# Patient Record
Sex: FEMALE | Race: WHITE | NOT HISPANIC OR LATINO | Employment: OTHER | URBAN - METROPOLITAN AREA
[De-identification: names, ages, dates, MRNs, and addresses within clinical notes are randomized per-mention and may not be internally consistent; named-entity substitution may affect disease eponyms.]

---

## 2022-06-24 ENCOUNTER — OFFICE VISIT (OUTPATIENT)
Dept: GASTROENTEROLOGY | Facility: CLINIC | Age: 87
End: 2022-06-24
Payer: MEDICARE

## 2022-06-24 VITALS
SYSTOLIC BLOOD PRESSURE: 128 MMHG | BODY MASS INDEX: 24.43 KG/M2 | OXYGEN SATURATION: 97 % | HEART RATE: 114 BPM | HEIGHT: 66 IN | DIASTOLIC BLOOD PRESSURE: 82 MMHG | WEIGHT: 152 LBS

## 2022-06-24 DIAGNOSIS — E43 SEVERE PROTEIN-CALORIE MALNUTRITION (HCC): Primary | ICD-10-CM

## 2022-06-24 PROCEDURE — 99203 OFFICE O/P NEW LOW 30 MIN: CPT | Performed by: PHYSICIAN ASSISTANT

## 2022-06-24 RX ORDER — TRAMADOL HYDROCHLORIDE 50 MG/1
50 TABLET ORAL EVERY 6 HOURS PRN
COMMUNITY
End: 2022-07-11

## 2022-06-24 RX ORDER — AMOXICILLIN 250 MG
1 CAPSULE ORAL DAILY
COMMUNITY
Start: 2022-05-23 | End: 2022-07-11

## 2022-06-24 RX ORDER — BISACODYL 10 MG
10 SUPPOSITORY, RECTAL RECTAL DAILY
COMMUNITY
End: 2022-07-11

## 2022-06-24 RX ORDER — MIRTAZAPINE 7.5 MG/1
7.5 TABLET, FILM COATED ORAL
COMMUNITY
End: 2022-07-11

## 2022-06-24 RX ORDER — LACTULOSE 10 G/15ML
20 SOLUTION ORAL 3 TIMES DAILY
COMMUNITY
End: 2022-07-11

## 2022-06-24 RX ORDER — OTC SKIN PROTECTANT DRUG PRODUCTS 0.04 G/G
OINTMENT TOPICAL
COMMUNITY

## 2022-06-24 RX ORDER — APIXABAN 5 MG/1
5 TABLET, FILM COATED ORAL 2 TIMES DAILY
COMMUNITY
Start: 2022-04-27 | End: 2022-06-30 | Stop reason: SDUPTHER

## 2022-06-24 RX ORDER — MINERAL OIL 100 G/100G
1 OIL RECTAL ONCE
COMMUNITY
End: 2022-07-11

## 2022-06-24 RX ORDER — AMMONIUM LACTATE 12 G/100G
1 LOTION TOPICAL 2 TIMES DAILY
COMMUNITY
Start: 2022-05-23 | End: 2022-07-11

## 2022-06-24 RX ORDER — PANTOPRAZOLE SODIUM 40 MG/1
40 TABLET, DELAYED RELEASE ORAL
COMMUNITY
Start: 2022-05-23 | End: 2022-07-17 | Stop reason: ALTCHOICE

## 2022-06-27 NOTE — PROGRESS NOTES
[facility] - Outpatient Consultation  Liol Weiner 81 y o  female MRN: [id number]  Encounter: [id number]          ASSESSMENT AND PLAN:      1  Severe protein-calorie malnutrition ([facility] )  She has progressing dementia  She has a sacral wound which is not healing felt to be at least in part due to malnutrition  Her son is requesting the placement of a PEG tube    ______________________________________________________________________    HPI:  69-year-old female with advancing dementia presents today with her son at the bedside to discuss placement of a PEG tube  Over the past few months the patient has had a significant decline in her functional status  She is now residing at a nursing home  She is not eating well and is becoming malnourished  She has a sacral wound which is nonhealing likely in part secondary to malnutrition  REVIEW OF SYSTEMS:    CONSTITUTIONAL: Denies any fever, chills, rigors, and weight loss  HEENT: No earache or tinnitus  Denies hearing loss or visual disturbances  CARDIOVASCULAR: No chest pain or palpitations  RESPIRATORY: Denies any cough, hemoptysis, shortness of breath or dyspnea on exertion  GASTROINTESTINAL: As noted in the History of Present Illness  GENITOURINARY: No problems with urination  Denies any hematuria or dysuria  NEUROLOGIC: No dizziness or vertigo, denies headaches  MUSCULOSKELETAL: Denies any muscle or joint pain  SKIN: Denies skin rashes or itching  ENDOCRINE: Denies excessive thirst  Denies intolerance to heat or cold  PSYCHOSOCIAL: Denies depression or anxiety  Denies any recent memory loss  Historical Information   Past Medical History:   Diagnosis Date    A-fib ([facility] )     Dementia ([facility] )     DVT (deep venous thrombosis) (HCC)     Hypertension     Pressure ulcer of sacral region      History reviewed  No pertinent surgical history    Social History   Social History     Substance and Sexual Activity   Alcohol Use None     Social History     Substance and Sexual Activity   Drug Use Never     Social History     Tobacco Use   Smoking Status Never Smoker   Smokeless Tobacco Never Used     History reviewed  No pertinent family history  Meds/Allergies       Current Outpatient Medications:     ammonium lactate (LAC-HYDRIN) 12 % lotion    bisacodyl (Dulcolax) 10 mg suppository    Eliquis 5 MG    lactulose (CHRONULAC) 10 g/15 mL solution    mineral oil enema    mirtazapine (REMERON) 7 5 MG tablet    pantoprazole (PROTONIX) 40 mg tablet    senna-docusate sodium (SENOKOT S) 8 6-50 mg per tablet    Skin Protectants, Misc  (PeriGuard) OINT    traMADol (ULTRAM) 50 mg tablet    No Known Allergies        Objective     Blood pressure 128/82, pulse (!) 114, height 5' 6" (1 676 m), weight 68 9 kg (152 lb), SpO2 97 %  Body mass index is 24 53 kg/m²  PHYSICAL EXAM:      General Appearance:   Alert, cooperative, no distress   HEENT:   Normocephalic, atraumatic, anicteric      Neck:  Supple, symmetrical, trachea midline   Lungs:   Clear to auscultation bilaterally; no rales, rhonchi or wheezing; respirations unlabored    Heart[de-identified]   Regular rate and rhythm; no murmur, rub, or gallop  Abdomen:   Soft, non-tender, non-distended; normal bowel sounds; no masses, no organomegaly    Genitalia:   Deferred    Rectal:   Deferred    Extremities:  No cyanosis, clubbing or edema    Pulses:  2+ and symmetric    Skin:  No jaundice, rashes, or lesions    Lymph nodes:  No palpable cervical lymphadenopathy        Lab Results:   No results found for any previous visit  Radiology Results:   No results found

## 2022-06-29 ENCOUNTER — TELEPHONE (OUTPATIENT)
Dept: GASTROENTEROLOGY | Facility: CLINIC | Age: 87
End: 2022-06-29

## 2022-06-29 NOTE — TELEPHONE ENCOUNTER
Spoke with Negrita and patient was scheduled for 7/1/22 as she is not eating much at the present time, so it was determined to be an emergent need      Emailed appropriate contacts for nutritional eval

## 2022-06-29 NOTE — TELEPHONE ENCOUNTER
Gillian Dhillon from [place] [facility] called to schedule PEG tube placement  Cell [phone]  [facility] [phone]

## 2022-06-30 ENCOUNTER — TELEPHONE (OUTPATIENT)
Dept: GASTROENTEROLOGY | Facility: CLINIC | Age: 87
End: 2022-06-30

## 2022-06-30 DIAGNOSIS — E43 SEVERE PROTEIN-CALORIE MALNUTRITION (HCC): Primary | ICD-10-CM

## 2022-06-30 RX ORDER — APIXABAN 5 MG/1
TABLET, FILM COATED ORAL
Qty: 30 TABLET | Refills: 3 | Status: SHIPPED | OUTPATIENT
Start: 2022-06-30 | End: 2022-06-30 | Stop reason: SDUPTHER

## 2022-06-30 RX ORDER — APIXABAN 5 MG/1
TABLET, FILM COATED ORAL
Qty: 30 TABLET | Refills: 3 | Status: SHIPPED | OUTPATIENT
Start: 2022-06-30

## 2022-06-30 NOTE — TELEPHONE ENCOUNTER
Trang Barr called to say they need to reschedule this peg tube placement as the patient can not be safely transported to the hospital     Rescheduled for 7/20/22

## 2022-07-01 ENCOUNTER — HOSPITAL ENCOUNTER (INPATIENT)
Facility: HOSPITAL | Age: 87
LOS: 9 days | Discharge: NURSING, REHAB OR LONG-TERM CARE | DRG: 640 | End: 2022-07-11
Attending: EMERGENCY MEDICINE | Admitting: INTERNAL MEDICINE
Payer: MEDICARE

## 2022-07-01 DIAGNOSIS — N39.0 UTI (URINARY TRACT INFECTION): ICD-10-CM

## 2022-07-01 DIAGNOSIS — R65.10 SIRS (SYSTEMIC INFLAMMATORY RESPONSE SYNDROME) (HCC): ICD-10-CM

## 2022-07-01 DIAGNOSIS — R53.1 GENERALIZED WEAKNESS: ICD-10-CM

## 2022-07-01 DIAGNOSIS — E43 SEVERE PROTEIN-CALORIE MALNUTRITION (HCC): Primary | ICD-10-CM

## 2022-07-01 PROBLEM — I10 ESSENTIAL HYPERTENSION: Status: ACTIVE | Noted: 2021-07-29

## 2022-07-01 PROBLEM — F03.91 DEMENTIA WITH BEHAVIORAL DISTURBANCE (HCC): Status: ACTIVE | Noted: 2021-07-29

## 2022-07-01 PROBLEM — F03.918 DEMENTIA WITH BEHAVIORAL DISTURBANCE: Status: ACTIVE | Noted: 2021-07-29

## 2022-07-01 PROBLEM — E46 PROTEIN MALNUTRITION (HCC): Status: ACTIVE | Noted: 2021-08-04

## 2022-07-01 PROBLEM — I82.409 DVT (DEEP VENOUS THROMBOSIS) (HCC): Status: ACTIVE | Noted: 2022-05-14

## 2022-07-01 LAB
ALBUMIN SERPL BCP-MCNC: 2 G/DL (ref 3.5–5)
ALP SERPL-CCNC: 60 U/L (ref 46–116)
ALT SERPL W P-5'-P-CCNC: 12 U/L (ref 12–78)
ANION GAP SERPL CALCULATED.3IONS-SCNC: 10 MMOL/L (ref 4–13)
APTT PPP: 34 SECONDS (ref 23–37)
AST SERPL W P-5'-P-CCNC: 53 U/L (ref 5–45)
BASOPHILS # BLD AUTO: 0.03 THOUSANDS/ΜL (ref 0–0.1)
BASOPHILS NFR BLD AUTO: 0 % (ref 0–1)
BILIRUB SERPL-MCNC: 1.13 MG/DL (ref 0.2–1)
BUN SERPL-MCNC: 24 MG/DL (ref 5–25)
CALCIUM ALBUM COR SERPL-MCNC: 11.5 MG/DL (ref 8.3–10.1)
CALCIUM SERPL-MCNC: 9.9 MG/DL (ref 8.3–10.1)
CHLORIDE SERPL-SCNC: 106 MMOL/L (ref 100–108)
CO2 SERPL-SCNC: 28 MMOL/L (ref 21–32)
CREAT SERPL-MCNC: 0.62 MG/DL (ref 0.6–1.3)
EOSINOPHIL # BLD AUTO: 0.07 THOUSAND/ΜL (ref 0–0.61)
EOSINOPHIL NFR BLD AUTO: 1 % (ref 0–6)
ERYTHROCYTE [DISTWIDTH] IN BLOOD BY AUTOMATED COUNT: 16.8 % (ref 11.6–15.1)
GFR SERPL CREATININE-BSD FRML MDRD: 82 ML/MIN/1.73SQ M
GLUCOSE SERPL-MCNC: 93 MG/DL (ref 65–140)
HCT VFR BLD AUTO: 37.8 % (ref 34.8–46.1)
HGB BLD-MCNC: 12.3 G/DL (ref 11.5–15.4)
IMM GRANULOCYTES # BLD AUTO: 0.06 THOUSAND/UL (ref 0–0.2)
IMM GRANULOCYTES NFR BLD AUTO: 1 % (ref 0–2)
INR PPP: 1.41 (ref 0.84–1.19)
LYMPHOCYTES # BLD AUTO: 1.91 THOUSANDS/ΜL (ref 0.6–4.47)
LYMPHOCYTES NFR BLD AUTO: 22 % (ref 14–44)
MCH RBC QN AUTO: 31.9 PG (ref 26.8–34.3)
MCHC RBC AUTO-ENTMCNC: 32.5 G/DL (ref 31.4–37.4)
MCV RBC AUTO: 98 FL (ref 82–98)
MONOCYTES # BLD AUTO: 0.73 THOUSAND/ΜL (ref 0.17–1.22)
MONOCYTES NFR BLD AUTO: 8 % (ref 4–12)
NEUTROPHILS # BLD AUTO: 5.85 THOUSANDS/ΜL (ref 1.85–7.62)
NEUTS SEG NFR BLD AUTO: 68 % (ref 43–75)
NRBC BLD AUTO-RTO: 0 /100 WBCS
PLATELET # BLD AUTO: 328 THOUSANDS/UL (ref 149–390)
PMV BLD AUTO: 9.7 FL (ref 8.9–12.7)
POTASSIUM SERPL-SCNC: 4.6 MMOL/L (ref 3.5–5.3)
PROT SERPL-MCNC: 6.2 G/DL (ref 6.4–8.2)
PROTHROMBIN TIME: 16.6 SECONDS (ref 11.6–14.5)
RBC # BLD AUTO: 3.86 MILLION/UL (ref 3.81–5.12)
SODIUM SERPL-SCNC: 144 MMOL/L (ref 136–145)
WBC # BLD AUTO: 8.65 THOUSAND/UL (ref 4.31–10.16)

## 2022-07-01 PROCEDURE — 99285 EMERGENCY DEPT VISIT HI MDM: CPT

## 2022-07-01 PROCEDURE — 36415 COLL VENOUS BLD VENIPUNCTURE: CPT | Performed by: EMERGENCY MEDICINE

## 2022-07-01 PROCEDURE — 99284 EMERGENCY DEPT VISIT MOD MDM: CPT | Performed by: EMERGENCY MEDICINE

## 2022-07-01 PROCEDURE — 99220 PR INITIAL OBSERVATION CARE/DAY 70 MINUTES: CPT | Performed by: INTERNAL MEDICINE

## 2022-07-01 PROCEDURE — 85730 THROMBOPLASTIN TIME PARTIAL: CPT | Performed by: EMERGENCY MEDICINE

## 2022-07-01 PROCEDURE — 85025 COMPLETE CBC W/AUTO DIFF WBC: CPT | Performed by: EMERGENCY MEDICINE

## 2022-07-01 PROCEDURE — 1124F ACP DISCUSS-NO DSCNMKR DOCD: CPT | Performed by: EMERGENCY MEDICINE

## 2022-07-01 PROCEDURE — 85610 PROTHROMBIN TIME: CPT | Performed by: EMERGENCY MEDICINE

## 2022-07-01 PROCEDURE — 80053 COMPREHEN METABOLIC PANEL: CPT | Performed by: EMERGENCY MEDICINE

## 2022-07-01 RX ORDER — SODIUM CHLORIDE 9 MG/ML
75 INJECTION, SOLUTION INTRAVENOUS CONTINUOUS
Status: DISCONTINUED | OUTPATIENT
Start: 2022-07-01 | End: 2022-07-04

## 2022-07-01 RX ORDER — ACETAMINOPHEN 325 MG/1
650 TABLET ORAL EVERY 6 HOURS PRN
Status: DISCONTINUED | OUTPATIENT
Start: 2022-07-01 | End: 2022-07-04

## 2022-07-01 RX ADMIN — SODIUM CHLORIDE 75 ML/HR: 0.9 INJECTION, SOLUTION INTRAVENOUS at 19:09

## 2022-07-01 NOTE — ED NOTES
INC care provided and pt repositioned for comfort  Pts son at bedside       David Johnson RN  07/01/22 0256

## 2022-07-01 NOTE — ED PROVIDER NOTES
Pt Name: Mely Rascon  MRN: [id number]  [place] 1935  Age/Sex: 81 y o  female  Date of evaluation: 7/1/2022  PCP: Cortney Gardner    Chief Complaint   Patient presents with    Weight Loss     Pt arrives via EMS from [facility] with an approx 17# wt  Loss over the past 2 weeks  As per EMS pts son is requesting PEG tube placement by Dr Mercy Ma         HPI and MDM    81 y o  female presenting for PEG tube placement  Son present in room with patient, patient has dementia, unable to provide any history  Patient has severe protein calorie malnutrition  Was supposed to get a PEG tube placed by GI today, however she missed the appointment because the nursing home was unable to find transportation for her  Son called 911 and patient was brought to the emergency department  He states that he did call the GI office, and GI attending Dr Lexie Ramos is aware of the patient  Patient appears as at baseline  No abdominal tenderness to palpation  I discussed with GI, unable to perform PEG tube placement today, however they can do it tomorrow, recommending hospitalization  Ordered baseline blood work, discussed with internal medicine for hospitalization  Son was updated with plan and he is in agreement  Medications - No data to display      Past Medical and Surgical History    Past Medical History:   Diagnosis Date    [facility]     Dementia ([facility] )     DVT [facility]deep venous thrombosis) ([facility] )     Hypertension     Pressure ulcer of sacral region        No past surgical history on file  No family history on file  Social History     Tobacco Use    Smoking status: Never Smoker    Smokeless tobacco: Never Used   Vaping Use    Vaping Use: Never used   Substance Use Topics    Drug use: Never           Allergies    No Known Allergies    Home Medications    Prior to Admission medications    Medication Sig Start Date End Date Taking?  Authorizing Provider   ammonium lactate (LAC-HYDRIN) 12 % lotion Apply 1 application topically 2 (two) times a day 5/23/22 5/23/23  Historical Provider, MD   bisacodyl (Dulcolax) 10 mg suppository Insert 10 mg into the rectum daily    Historical Provider, MD   Eliquis 5 MG Hold medication today, 6/30/2022 and tomorrow, 7/1/2022 6/30/22   Antoni De Souza PA-C   lactulose (CHRONULAC) 10 g/15 mL solution Take 20 g by mouth 3 (three) times a day    Historical Provider, MD   mineral oil enema Insert 1 enema into the rectum once    Historical Provider, MD   mirtazapine (REMERON) 7 5 MG tablet Take 7 5 mg by mouth daily at bedtime    Historical Provider, MD   pantoprazole (PROTONIX) 40 mg tablet Take 40 mg by mouth 5/23/22 7/12/22  Historical Provider, MD   senna-docusate sodium (SENOKOT S) 8 6-50 mg per tablet Take 1 tablet by mouth daily 5/23/22 5/23/23  Historical Provider, MD   Skin Protectants, Misc  (PeriGuard) OINT Apply topically    Historical Provider, MD   traMADol (ULTRAM) 50 mg tablet Take 50 mg by mouth every 6 (six) hours as needed for moderate pain    Historical Provider, MD           Review of Systems    Review of Systems   Unable to perform ROS: Dementia           Physical Exam      ED Triage Vitals [07/01/22 1615]   Temp Pulse Respirations Blood Pressure SpO2   -- (!) 128 18 122/77 100 %      Temp src Heart Rate Source Patient Position - Orthostatic VS BP Location FiO2 (%)   -- -- -- -- --      Pain Score       --               Physical Exam  Constitutional:       General: She is not in acute distress  HENT:      Head: Normocephalic and atraumatic  Nose: Nose normal       Mouth/Throat:      Mouth: Mucous membranes are moist    Eyes:      Extraocular Movements: Extraocular movements intact  Pupils: Pupils are equal, round, and reactive to light  Cardiovascular:      Rate and Rhythm: Normal rate and regular rhythm  Pulmonary:      Effort: Pulmonary effort is normal  No respiratory distress  Abdominal:      General: There is no distension  Palpations: Abdomen is soft  Tenderness: There is no abdominal tenderness  There is no guarding or rebound  Musculoskeletal:         General: No tenderness or signs of injury  Cervical back: Normal range of motion and neck supple  Skin:     General: Skin is warm  Findings: No erythema  Neurological:      Mental Status: She is alert  Mental status is at baseline  Diagnostic Results      Labs:    Results Reviewed     Procedure Component Value Units Date/Time    CBC and differential [[id number]]     Lab Status: No result Specimen: Blood     Comprehensive metabolic panel [[id number]]     Lab Status: No result Specimen: Blood     Protime-INR [[id number]]     Lab Status: No result Specimen: Blood     APTT [[id number]]     Lab Status: No result Specimen: Blood           All labs reviewed and utilized in the medical decision making process    Radiology:    No orders to display       All radiology studies independently viewed by me and interpreted by the radiologist     Procedure    Procedures        FINAL IMPRESSION    Final diagnoses:   Severe protein-calorie malnutrition ([address] )         DISPOSITION    Time reflects when diagnosis was documented in both MDM as applicable and the Disposition within this note     Time User Action Codes Description Comment    7/1/2022  4:52 PM Chasidy Yu Add [E43] Severe protein-calorie malnutrition [facility])       ED Disposition     ED Disposition   Admit    Condition   Stable    Date/Time   Fri Jul 1, 2022  4:52 PM    Comment   Case was discussed with Dr Wandy Smith and the patient's admission status was agreed to be Admission Status: observation status to the service of Dr Wandy Smith   Follow-up Information    None           PATIENT REFERRED TO:    No follow-up provider specified  DISCHARGE MEDICATIONS:    Patient's Medications   Discharge Prescriptions    No medications on file       No discharge procedures on file           Lisandro Dillon DO        This note was partially completed using voice recognition technology, and was scanned for gross errors; however some errors may still exist  Please contact the author with any questions or requests for clarification        Warrick Merlin, DO  07/01/22 9655

## 2022-07-01 NOTE — ASSESSMENT & PLAN NOTE
Malnutrition Findings:        severe protein calorie malnutrition secondary to severe dementia   Plan for PEG tube tomorrow  Missed appointment for PEG tube unable to perform today  Discussed with GI plan for PEG tube placement tomorrow will make NPO at midnight                          BMI Findings: There is no height or weight on file to calculate BMI

## 2022-07-01 NOTE — H&P
[facility]  H&P- Cyndie Hill 1935, 81 y o  female MRN: [id number]  Unit/Bed#: ED 10 Encounter: [id number]  Primary Care Provider: Dmitry Flores   Date and time admitted to hospital: 7/1/2022  4:10 PM    * Protein malnutrition [facility])  Assessment & Plan  Malnutrition Findings:        severe protein calorie malnutrition secondary to severe dementia   Plan for PEG tube tomorrow  Missed appointment for PEG tube unable to perform today  Discussed with GI plan for PEG tube placement tomorrow will make NPO at midnight                          BMI Findings: There is no height or weight on file to calculate BMI  Dementia with behavioral disturbance ([facility] )  Assessment & Plan  Currently at baseline  Delirium precautions    Essential hypertension  Assessment & Plan  Continue home meds    DVT (deep venous thrombosis) ([facility])  Assessment & Plan  Hold Eliquis for now given PEG tube placement      VTE Prophylaxis: Pharmacologic VTE Prophylaxis contraindicated due to held due to planned procedure  / sequential compression device   Code Status: full code  POLST: There is no POLST form on file for this patient (pre-hospital)  Discussion with family: pt    Anticipated Length of Stay:  Patient will be admitted on an Observation basis with an anticipated length of stay of  < 2 midnights  Justification for Hospital Stay:  PEG tube placement    Total Time for Visit, including Counseling / Coordination of Care: 60 minutes  Greater than 50% of this total time spent on direct patient counseling and coordination of care  Chief Complaint:   Generalized weakness    History of Present Illness:    Cyndie Hill is a 81 y o  female with past medical history significant atrial fibrillation on Eliquis, dementia, hypertension who initially presented after missed appointment for peg tube placement    Was sent in for PEG tube placement  Currently without any acute complaints    Review of Systems:    Review of Systems   Constitutional: Negative for activity change, appetite change, chills, diaphoresis, fever and unexpected weight change  HENT: Negative for congestion, facial swelling and rhinorrhea  Eyes: Negative for photophobia and visual disturbance  Respiratory: Negative for cough, shortness of breath and wheezing  Cardiovascular: Negative for chest pain and palpitations  Gastrointestinal: Negative for abdominal pain, blood in stool, constipation, diarrhea, nausea and vomiting  Genitourinary: Negative for decreased urine volume, difficulty urinating, dysuria, flank pain, frequency, hematuria and urgency  Musculoskeletal: Negative for arthralgias, back pain, joint swelling and myalgias  Neurological: Negative for dizziness, syncope, facial asymmetry, light-headedness, numbness and headaches  Psychiatric/Behavioral: Negative for confusion and decreased concentration  The patient is not nervous/anxious  Past Medical and Surgical History:     Past Medical History:   Diagnosis Date    A-fib ([place] [address] )     Dementia ([place] [address] )     DVT (deep venous thrombosis) ([place] [address] )     Hypertension     Pressure ulcer of sacral region        No past surgical history on file  Meds/Allergies:    Prior to Admission medications    Medication Sig Start Date End Date Taking?  Authorizing Provider   ammonium lactate (LAC-HYDRIN) 12 % lotion Apply 1 application topically 2 (two) times a day 5/23/22 5/23/23  Historical Provider, MD   bisacodyl (Dulcolax) 10 mg suppository Insert 10 mg into the rectum daily    Historical Provider, MD   Eliquis 5 MG Hold medication today, 6/30/2022 and tomorrow, 7/1/2022 6/30/22   Seda Jo PA-C   lactulose (CHRONULAC) 10 g/15 mL solution Take 20 g by mouth 3 (three) times a day    Historical Provider, MD   mineral oil enema Insert 1 enema into the rectum once    Historical Provider, MD   mirtazapine (REMERON) 7 5 MG tablet Take 7 5 mg by mouth daily at bedtime Historical Provider, MD   pantoprazole (PROTONIX) 40 mg tablet Take 40 mg by mouth 5/23/22 7/12/22  Historical Provider, MD   senna-docusate sodium (SENOKOT S) 8 6-50 mg per tablet Take 1 tablet by mouth daily 5/23/22 5/23/23  Historical Provider, MD   Skin Protectants, Misc  (PeriGuard) OINT Apply topically    Historical Provider, MD   traMADol (ULTRAM) 50 mg tablet Take 50 mg by mouth every 6 (six) hours as needed for moderate pain    Historical Provider, MD     I have reviewed home medications with patient personally  Allergies: No Known Allergies    Social History:     Marital Status:      Patient Pre-hospital Living Situation: home  Patient Pre-hospital Level of Mobility: independent  Patient Pre-hospital Diet Restrictions: none  Substance Use History:   Social History     Substance and Sexual Activity   Alcohol Use None     Social History     Tobacco Use   Smoking Status Never Smoker   Smokeless Tobacco Never Used     Social History     Substance and Sexual Activity   Drug Use Never       Family History:    No family history on file  Physical Exam:     Vitals:   Blood Pressure: 122/77 (07/01/22 1615)  Pulse: (!) 128 (07/01/22 1615)  Respirations: 18 (07/01/22 1615)  SpO2: 100 % (07/01/22 1615)    Physical Exam  Constitutional:       General: She is not in acute distress  Appearance: She is well-developed  She is not diaphoretic  HENT:      Head: Normocephalic and atraumatic  Nose: Nose normal       Mouth/Throat:      Pharynx: No oropharyngeal exudate  Eyes:      General: No scleral icterus  Right eye: No discharge  Left eye: No discharge  Conjunctiva/sclera: Conjunctivae normal    Neck:      Thyroid: No thyromegaly  Vascular: No JVD  Cardiovascular:      Rate and Rhythm: Normal rate and regular rhythm  Heart sounds: Normal heart sounds  No murmur heard  No friction rub  No gallop     Pulmonary:      Effort: Pulmonary effort is normal  No respiratory distress  Breath sounds: Normal breath sounds  No wheezing or rales  Chest:      Chest wall: No tenderness  Abdominal:      General: Bowel sounds are normal  There is no distension  Palpations: Abdomen is soft  Tenderness: There is no abdominal tenderness  There is no guarding or rebound  Musculoskeletal:         General: No tenderness or deformity  Normal range of motion  Cervical back: Normal range of motion and neck supple  Skin:     General: Skin is warm and dry  Findings: No erythema or rash  Neurological:      Mental Status: She is alert  Mental status is at baseline  Cranial Nerves: No cranial nerve deficit  Sensory: No sensory deficit  Motor: No abnormal muscle tone  Coordination: Coordination normal              Additional Data:     Lab Results: I have personally reviewed pertinent reports  Imaging: I have personally reviewed pertinent reports  No orders to display       EKG, Pathology, and Other Studies Reviewed on Admission:   · EKG: reviewed    Allscripts / Epic Records Reviewed: Yes     ** Please Note: This note has been constructed using a voice recognition system   **

## 2022-07-02 ENCOUNTER — APPOINTMENT (OUTPATIENT)
Dept: GASTROENTEROLOGY | Facility: HOSPITAL | Age: 87
DRG: 640 | End: 2022-07-02
Payer: MEDICARE

## 2022-07-02 ENCOUNTER — ANESTHESIA EVENT (INPATIENT)
Dept: GASTROENTEROLOGY | Facility: HOSPITAL | Age: 87
DRG: 640 | End: 2022-07-02
Payer: MEDICARE

## 2022-07-02 ENCOUNTER — ANESTHESIA (INPATIENT)
Dept: GASTROENTEROLOGY | Facility: HOSPITAL | Age: 87
DRG: 640 | End: 2022-07-02
Payer: MEDICARE

## 2022-07-02 PROBLEM — R53.1 GENERALIZED WEAKNESS: Status: ACTIVE | Noted: 2022-07-02

## 2022-07-02 LAB
ANION GAP SERPL CALCULATED.3IONS-SCNC: 9 MMOL/L (ref 4–13)
BASOPHILS # BLD AUTO: 0.02 THOUSANDS/ΜL (ref 0–0.1)
BASOPHILS NFR BLD AUTO: 0 % (ref 0–1)
BUN SERPL-MCNC: 20 MG/DL (ref 5–25)
CALCIUM SERPL-MCNC: 9 MG/DL (ref 8.3–10.1)
CHLORIDE SERPL-SCNC: 110 MMOL/L (ref 100–108)
CO2 SERPL-SCNC: 27 MMOL/L (ref 21–32)
CREAT SERPL-MCNC: 0.54 MG/DL (ref 0.6–1.3)
EOSINOPHIL # BLD AUTO: 0.07 THOUSAND/ΜL (ref 0–0.61)
EOSINOPHIL NFR BLD AUTO: 1 % (ref 0–6)
ERYTHROCYTE [DISTWIDTH] IN BLOOD BY AUTOMATED COUNT: 16.6 % (ref 11.6–15.1)
GFR SERPL CREATININE-BSD FRML MDRD: 86 ML/MIN/1.73SQ M
GLUCOSE P FAST SERPL-MCNC: 100 MG/DL (ref 65–99)
GLUCOSE SERPL-MCNC: 100 MG/DL (ref 65–140)
HCT VFR BLD AUTO: 32.1 % (ref 34.8–46.1)
HGB BLD-MCNC: 10.6 G/DL (ref 11.5–15.4)
IMM GRANULOCYTES # BLD AUTO: 0.02 THOUSAND/UL (ref 0–0.2)
IMM GRANULOCYTES NFR BLD AUTO: 0 % (ref 0–2)
LYMPHOCYTES # BLD AUTO: 1.44 THOUSANDS/ΜL (ref 0.6–4.47)
LYMPHOCYTES NFR BLD AUTO: 24 % (ref 14–44)
MCH RBC QN AUTO: 32.2 PG (ref 26.8–34.3)
MCHC RBC AUTO-ENTMCNC: 33 G/DL (ref 31.4–37.4)
MCV RBC AUTO: 98 FL (ref 82–98)
MONOCYTES # BLD AUTO: 0.58 THOUSAND/ΜL (ref 0.17–1.22)
MONOCYTES NFR BLD AUTO: 10 % (ref 4–12)
NEUTROPHILS # BLD AUTO: 3.77 THOUSANDS/ΜL (ref 1.85–7.62)
NEUTS SEG NFR BLD AUTO: 65 % (ref 43–75)
NRBC BLD AUTO-RTO: 0 /100 WBCS
PLATELET # BLD AUTO: 258 THOUSANDS/UL (ref 149–390)
PMV BLD AUTO: 9.3 FL (ref 8.9–12.7)
POTASSIUM SERPL-SCNC: 3 MMOL/L (ref 3.5–5.3)
RBC # BLD AUTO: 3.29 MILLION/UL (ref 3.81–5.12)
SODIUM SERPL-SCNC: 146 MMOL/L (ref 136–145)
WBC # BLD AUTO: 5.9 THOUSAND/UL (ref 4.31–10.16)

## 2022-07-02 PROCEDURE — 80048 BASIC METABOLIC PNL TOTAL CA: CPT | Performed by: INTERNAL MEDICINE

## 2022-07-02 PROCEDURE — 99223 1ST HOSP IP/OBS HIGH 75: CPT | Performed by: PHYSICIAN ASSISTANT

## 2022-07-02 PROCEDURE — 0DH63UZ INSERTION OF FEEDING DEVICE INTO STOMACH, PERCUTANEOUS APPROACH: ICD-10-PCS | Performed by: INTERNAL MEDICINE

## 2022-07-02 PROCEDURE — 99233 SBSQ HOSP IP/OBS HIGH 50: CPT

## 2022-07-02 PROCEDURE — 85025 COMPLETE CBC W/AUTO DIFF WBC: CPT | Performed by: INTERNAL MEDICINE

## 2022-07-02 PROCEDURE — 43246 EGD PLACE GASTROSTOMY TUBE: CPT | Performed by: INTERNAL MEDICINE

## 2022-07-02 RX ORDER — SODIUM CHLORIDE, SODIUM LACTATE, POTASSIUM CHLORIDE, CALCIUM CHLORIDE 600; 310; 30; 20 MG/100ML; MG/100ML; MG/100ML; MG/100ML
INJECTION, SOLUTION INTRAVENOUS CONTINUOUS PRN
Status: DISCONTINUED | OUTPATIENT
Start: 2022-07-02 | End: 2022-07-02

## 2022-07-02 RX ORDER — CEFAZOLIN SODIUM 1 G/50ML
1000 SOLUTION INTRAVENOUS ONCE
Status: COMPLETED | OUTPATIENT
Start: 2022-07-02 | End: 2022-07-02

## 2022-07-02 RX ORDER — PROPOFOL 10 MG/ML
INJECTION, EMULSION INTRAVENOUS AS NEEDED
Status: DISCONTINUED | OUTPATIENT
Start: 2022-07-02 | End: 2022-07-02

## 2022-07-02 RX ADMIN — PROPOFOL 20 MG: 10 INJECTION, EMULSION INTRAVENOUS at 13:55

## 2022-07-02 RX ADMIN — PROPOFOL 50 MG: 10 INJECTION, EMULSION INTRAVENOUS at 13:52

## 2022-07-02 RX ADMIN — ACETAMINOPHEN 650 MG: 325 TABLET, FILM COATED ORAL at 22:45

## 2022-07-02 RX ADMIN — PROPOFOL 20 MG: 10 INJECTION, EMULSION INTRAVENOUS at 13:53

## 2022-07-02 RX ADMIN — CEFAZOLIN SODIUM 1000 MG: 1 SOLUTION INTRAVENOUS at 13:30

## 2022-07-02 RX ADMIN — PROPOFOL 20 MG: 10 INJECTION, EMULSION INTRAVENOUS at 13:58

## 2022-07-02 RX ADMIN — SODIUM CHLORIDE, SODIUM LACTATE, POTASSIUM CHLORIDE, AND CALCIUM CHLORIDE: .6; .31; .03; .02 INJECTION, SOLUTION INTRAVENOUS at 13:36

## 2022-07-02 RX ADMIN — PROPOFOL 10 MG: 10 INJECTION, EMULSION INTRAVENOUS at 14:00

## 2022-07-02 NOTE — PROGRESS NOTES
[address]  Progress Note - Don Emmanuel 1935, 81 y o  female MRN: [id number]  Unit/Bed#: -01 Encounter: [id number]  Primary Care Provider: Mulu Krishna   Date and time admitted to hospital: 7/1/2022  4:10 PM    * Protein malnutrition ([facility] )  Assessment & Plan  Malnutrition Findings:      ·  Severe protein calorie malnutrition secondary to severe dementia  · Missed appointment for PEG tube  · Plan for PEG tube placement today 7/2                        BMI Findings: Body mass index is 24 02 kg/m²  Generalized weakness  Assessment & Plan  · Patient with generalized weakness and according to son, not pleased with patient's care at her prior nursing facility  · PT/OT  · Case management for placement    Dementia with behavioral disturbance [facility])  Assessment & Plan  · Currently at baseline  · Delirium precautions    Essential hypertension  Assessment & Plan  · Well controlled  · Not on home meds  · Continue to monitor    DV[facility]) ([facility] )  Assessment & Plan  · Hold Eliquis for now given PEG tube placement       VTE Pharmacologic Prophylaxis:   Home Eliquis on hold for PEG tube placement    Patient Centered Rounds: I performed bedside rounds with nursing staff today  Discussions with Specialists or Other Care Team Provider:  Case Management, nursing    Education and Discussions with Family / Patient: Updated  (son) via phone  Time Spent for Care: 30 minutes  More than 50% of total time spent on counseling and coordination of care as described above  Current Length of Stay: 0 day(s)  Current Patient Status: Observation   Certification Statement: The patient will continue to require additional inpatient hospital stay due to PEG tube placement  Discharge Plan: Anticipate discharge in 48-72 hrs to discharge location to be determined pending rehab evaluations      Code Status: Level 1 - Full Code    Subjective:   Patient seen and examined resting comfortably in bed, in no apparent distress  Patient sleeping upon my entrance, awoke to gentle touch  No acute events overnight  Patient offers no complaints at this time  Objective:     Vitals:   Temp (24hrs), Av 2 °F (36 8 °C), Min:97 7 °F (36 5 °C), Max:98 8 °F (37 1 °C)    Temp:  [97 7 °F (36 5 °C)-98 8 °F (37 1 °C)] 98 °F (36 7 °C)  HR:  [] 102  Resp:  [18] 18  BP: (122-144)/(77-92) 138/79  SpO2:  [96 %-100 %] 98 %  Body mass index is 24 02 kg/m²  Input and Output Summary (last 24 hours): Intake/Output Summary (Last 24 hours) at 2022 0957  Last data filed at 2022 0820  Gross per 24 hour   Intake 0 ml   Output --   Net 0 ml       Physical Exam:   Physical Exam  Vitals and nursing note reviewed  Constitutional:       General: She is sleeping  She is not in acute distress  Appearance: She is normal weight  She is ill-appearing (Chronic)  She is not toxic-appearing  HENT:      Head: Normocephalic and atraumatic  Mouth/Throat:      Mouth: Mucous membranes are moist    Eyes:      Conjunctiva/sclera: Conjunctivae normal    Cardiovascular:      Rate and Rhythm: Normal rate and regular rhythm  Pulses: Normal pulses  Heart sounds: Normal heart sounds  Pulmonary:      Effort: Pulmonary effort is normal  No respiratory distress  Breath sounds: Normal breath sounds  No wheezing, rhonchi or rales  Abdominal:      General: Bowel sounds are normal  There is no distension  Palpations: Abdomen is soft  Tenderness: There is no abdominal tenderness  Musculoskeletal:      Cervical back: Neck supple  Right lower leg: No edema  Left lower leg: No edema  Skin:     General: Skin is warm and dry  Neurological:      General: No focal deficit present  Mental Status: She is easily aroused  Cranial Nerves: No cranial nerve deficit  Sensory: No sensory deficit  Motor: Weakness (generalized) present  Coordination: Coordination normal    Psychiatric:         Mood and Affect: Mood normal          Behavior: Behavior normal          Thought Content: Thought content normal           Additional Data:     Labs:  Results from last 7 days   Lab Units 07/02/22  0640   WBC Thousand/uL 5 90   HEMOGLOBIN g/dL 10 6*   HEMATOCRIT % 32 1*   PLATELETS Thousands/uL 258   NEUTROS PCT % 65   LYMPHS PCT % 24   MONOS PCT % 10   EOS PCT % 1     Results from last 7 days   Lab Units 07/02/22  0640 07/01/22  1718   SODIUM mmol/L 146* 144   POTASSIUM mmol/L 3 0* 4 6   CHLORIDE mmol/L 110* 106   CO2 mmol/L 27 28   BUN mg/dL 20 24   CREATININE mg/dL 0 54* 0 62   ANION GAP mmol/L 9 10   CALCIUM mg/dL 9 0 9 9   ALBUMIN g/dL  --  2 0*   TOTAL BILIRUBIN mg/dL  --  1 13*   ALK PHOS U/L  --  60   ALT U/L  --  12   AST U/L  --  53*   GLUCOSE RANDOM mg/dL 100 93     Results from last 7 days   Lab Units 07/01/22  1718   INR  1 41*                   Lines/Drains:  Invasive Devices  Report    Peripheral Intravenous Line  Duration           Peripheral IV 07/01/22 Right Antecubital <1 day                      Imaging: No pertinent imaging reviewed  Recent Cultures (last 7 days):         Last 24 Hours Medication List:   Current Facility-Administered Medications   Medication Dose Route Frequency Provider Last Rate    acetaminophen  650 mg Oral Q6H PRN Ismael Chaudhary MD      sodium chloride  75 mL/hr Intravenous Continuous Ismael Chaudhary MD 75 mL/hr (07/01/22 1909)        Today, Patient Was Seen By: Eusebia Booker PA-C    **Please Note: This note may have been constructed using a voice recognition system  **

## 2022-07-02 NOTE — PLAN OF CARE
Problem: GASTROINTESTINAL - ADULT  Goal: Minimal or absence of nausea and/or vomiting  Description: INTERVENTIONS:  - Administer IV fluids if ordered to ensure adequate hydration  - Maintain NPO status until nausea and vomiting are resolved  - Nasogastric tube if ordered  - Administer ordered antiemetic medications as needed  - Provide nonpharmacologic comfort measures as appropriate  - Advance diet as tolerated, if ordered  - Consider nutrition services referral to assist patient with adequate nutrition and appropriate food choices  Outcome: Progressing  Goal: Maintains or returns to baseline bowel function  Description: INTERVENTIONS:  - Assess bowel function  - Encourage oral fluids to ensure adequate hydration  - Administer IV fluids if ordered to ensure adequate hydration  - Administer ordered medications as needed  - Encourage mobilization and activity  - Consider nutritional services referral to assist patient with adequate nutrition and appropriate food choices  Outcome: Progressing  Goal: Maintains adequate nutritional intake  Description: INTERVENTIONS:  - Monitor percentage of each meal consumed  - Identify factors contributing to decreased intake, treat as appropriate  - Assist with meals as needed  - Monitor I&O, weight, and lab values if indicated  - Obtain nutrition services referral as needed  Outcome: Progressing  Goal: Establish and maintain optimal ostomy function  Description: INTERVENTIONS:  - Assess bowel function  - Encourage oral fluids to ensure adequate hydration  - Administer IV fluids if ordered to ensure adequate hydration   - Administer ordered medications as needed  - Encourage mobilization and activity  - Nutrition services referral to assist patient with appropriate food choices  - Assess stoma site  - Consider wound care consult   Outcome: Progressing  Goal: Oral mucous membranes remain intact  Description: INTERVENTIONS  - Assess oral mucosa and hygiene practices  - Implement preventative oral hygiene regimen  - Implement oral medicated treatments as ordered  - Initiate Nutrition services referral as needed  Outcome: Progressing     Problem: SKIN/TISSUE INTEGRITY - ADULT  Goal: Skin Integrity remains intact(Skin Breakdown Prevention)  Description: Assess:  -Perform Saúl assessment   -Clean and moisturize skin   -Inspect skin when repositioning, toileting, and assisting with ADLS  -Assess under medical devices s  -Assess extremities for adequate circulation and sensation     Bed Management:  -Have minimal linens on bed & keep smooth, unwrinkled  -Change linens as needed when moist or perspiring  -Avoid sitting or lying in one position for more than 2 hours while in bed  -Keep HOB at 30 degrees     Toileting:  -Offer bedside commode  -Assess for incontinence every 2 hrs   -Use incontinent care products after each incontinent episode such     Activity:    -Encourage activity and walks on unit  -Encourage or provide ROM exercises   -Turn and reposition patient every 2 Hours  -Use appropriate equipment to lift or move patient in bed  -Instruct/ Assist with weight shifting every 30 min when out of bed in chair  -Consider limitation of chair time 2 hour intervals    Skin Care:  -Avoid use of baby powder, tape, friction and shearing, hot water or constrictive clothing  -Relieve pressure over bony prominences -Do not massage red bony areas    Next Steps:  -Teach patient strategies to minimize risks    -Consider consults to  interdisciplinary teams     Outcome: Progressing  Goal: Incision(s), wounds(s) or drain site(s) healing without S/S of infection  Description: INTERVENTIONS  - Assess and document dressing, incision, wound bed, drain sites and surrounding tissue  - Provide patient and family education  - Perform skin care/dressing changes Outcome: Progressing  Goal: Pressure injury heals and does not worsen  Description: Interventions:  - Implement low air loss mattress or specialty surface (Criteria met)  - Apply silicone foam dressing  - Instruct/assist with weight shifting   - Use special pressure reducing interventions s  - Apply fecal or urinary incontinence containment device   - Perform passive or active ROM   - Turn and reposition patient & offload bony prominences   - Utilize friction reducing device or surface for transfers   - Consider consults to  interdisciplinary teams   - Use incontinent care products after each incontinent episode   Problem: MUSCULOSKELETAL - ADULT  Goal: Maintain or return mobility to safest level of function  Description: INTERVENTIONS:  - Assess patient's ability to carry out ADLs; assess patient's baseline for ADL function and identify physical deficits which impact ability to perform ADLs (bathing, care of mouth/teeth, toileting, grooming, dressing, etc )  - Assess/evaluate cause of self-care deficits   - Assess range of motion  - Assess patient's mobility  - Assess patient's need for assistive devices and provide as appropriate  - Encourage maximum independence but intervene and supervise when necessary  - Involve family in performance of ADLs  - Assess for home care needs following discharge   - Consider OT consult to assist with ADL evaluation and planning for discharge  - Provide patient education as appropriate  Outcome: Progressing  Goal: Maintain proper alignment of affected body part  Description: INTERVENTIONS:  - Support, maintain and protect limb and body alignment  - Provide patient/ family with appropriate education  Outcome: Progressing

## 2022-07-02 NOTE — MALNUTRITION/BMI
This medical record reflects one or more clinical indicators suggestive of malnutrition and/or morbid obesity  Malnutrition Findings:   Adult Malnutrition type: Chronic illness  Adult Degree of Malnutrition: Other severe protein calorie malnutrition  Malnutrition Characteristics: Fat loss, Inadequate energy, Weight loss              360 Statement: Malnutrition related to dementia as evidenced by 14#(9%) weight loss from 5/14/# to 7/1/#, <75% energy intake compared to estimated needs >1 month, temporal wasting, sunken orbitals  Patient for PEG tube placement today  Patient at possible risk for refeeding syndrome;  increase TF to goal rate slowly  Recommend Jevity 1 2 @ 10mL/hr continuous via PEG tube increase by 10mL q12hrs to goal rate of 60mL/hr with 100mL free water flush q4hrs which will provide 1728kcal, 80g pro, 1762mL total fluid, 243g CHO and 57g fat  Monitor K, Mg and P  Consider adjusting TF and adding protein modular once goal rate is achieved  Recommend ST evaluation to assess for appropriateness of oral diet/diet texture if pleasure feeds are desired  BMI Findings: Body mass index is 24 02 kg/m²  See Nutrition note dated 07/02/2022 for additional details  Completed nutrition assessment is viewable in the nutrition documentation

## 2022-07-02 NOTE — ASSESSMENT & PLAN NOTE
· Patient with generalized weakness and according to son, not pleased with patient's care at her prior nursing facility  · PT/OT  · Case management for placement

## 2022-07-02 NOTE — CONSULTS
Consultation - [facility]  Mely Rascon 81 y o  female MRN: [id number]  Unit/Bed#: -01 Encounter: [id number]      Inpatient consult to Case Management  Consult performed by: Suzanna Chu PA-C  Consult ordered by: Bassam Nelson PA-C        Reason for Consult / Principal Problem:  Peg tube placement    HPI: Mely Rascon is a 81y o  year old female who presents with past medical history significant for dementia, hypertension, sacral ulceration, and atrial fibrillation on Eliquis  Patient was actually supposed to be scheduled for an outpatient EGD with PEG tube placement yesterday but unfortunately due to transportation issues this could not be done  Patients son arranged for transport to the hospital yesterday afternoon  Patient is pleasantly confused  Patient was seen in our outpatient clinic on June 24, 2022  Patient was brought in by her son  At that time it was discussed for patient to undergo an EGD with PEG tube due to her significant decline in functional status  REVIEW OF SYSTEMS:     CONSTITUTIONAL: Denies any fever, chills, or rigors  Good appetite, and no recent weight loss  HEENT: No earache or tinnitus  Denies hearing loss or visual disturbances  CARDIOVASCULAR: No chest pain or palpitations  RESPIRATORY: Denies any cough, hemoptysis, shortness of breath or dyspnea on exertion  GASTROINTESTINAL: As noted in the History of Present Illness  GENITOURINARY: No problems with urination  Denies any hematuria or dysuria  NEUROLOGIC: No dizziness or vertigo, denies headaches  MUSCULOSKELETAL: Denies any muscle or joint pain  SKIN: Denies skin rashes or itching  ENDOCRINE: Denies excessive thirst  Denies intolerance to heat or cold  PSYCHOSOCIAL: Denies depression or anxiety  Denies any recent memory loss       Historical Information   Past Medical History:   Diagnosis Date    A-fib ([facility] )     Dementia ([facility] )     DVT (deep venous thrombosis) ([facility] )     Hypertension     Pressure ulcer of sacral region      History reviewed  No pertinent surgical history  Social History   Social History     Substance and Sexual Activity   Alcohol Use Not Currently     Social History     Substance and Sexual Activity   Drug Use Never     Social History     Tobacco Use   Smoking Status Never Smoker   Smokeless Tobacco Never Used     History reviewed  No pertinent family history  Meds/Allergies     Medications Prior to Admission   Medication    Eliquis 5 MG    Skin Protectants, Misc  (PeriGuard) OINT    ammonium lactate (LAC-HYDRIN) 12 % lotion    bisacodyl (DULCOLAX) 10 mg suppository    lactulose (CHRONULAC) 10 g/15 mL solution    mineral oil enema    mirtazapine (REMERON) 7 5 MG tablet    pantoprazole (PROTONIX) 40 mg tablet    senna-docusate sodium (SENOKOT S) 8 6-50 mg per tablet    traMADol (ULTRAM) 50 mg tablet     Current Facility-Administered Medications   Medication Dose Route Frequency    acetaminophen (TYLENOL) tablet 650 mg  650 mg Oral Q6H PRN    sodium chloride 0 9 % infusion  75 mL/hr Intravenous Continuous       No Known Allergies    Objective   Blood pressure 138/79, pulse 102, temperature 98 °F (36 7 °C), resp  rate 18, height 5' 6" (1 676 m), weight 67 5 kg (148 lb 13 oz), SpO2 98 %  Intake/Output Summary (Last 24 hours) at 7/2/2022 1049  Last data filed at 7/2/2022 0820  Gross per 24 hour   Intake 0 ml   Output --   Net 0 ml         PHYSICAL EXAM:      General Appearance:   Alert, cooperative, no distress, appears stated age    HEENT:   Normocephalic, atraumatic, anicteric      Neck:  Supple, symmetrical, trachea midline, no adenopathy;    thyroid: no enlargement/tenderness/nodules; no carotid  bruit or JVD    Lungs:   Clear to auscultation bilaterally; no rales, rhonchi or wheezing; respirations unlabored    Heart[de-identified]   S1 and S2 normal; regular rate and rhythm; no murmur, rub, or gallop     Abdomen:   Soft, non-tender, non-distended; normal bowel sounds; no masses, no organomegaly    Genitalia:   Deferred    Rectal:   Deferred    Extremities:  No cyanosis, clubbing or edema    Pulses:  2+ and symmetric all extremities    Skin:  Skin color, texture, turgor normal, no rashes or lesions    Lymph nodes:  No palpable cervical, axillary or inguinal lymphadenopathy        Lab Results:   Admission on 07/01/2022   Component Date Value    WBC 07/01/2022 8 65     RBC 07/01/2022 3 86     Hemoglobin 07/01/2022 12 3     Hematocrit 07/01/2022 37 8     MCV 07/01/2022 98     MCH 07/01/2022 31 9     MCHC 07/01/2022 32 5     RDW 07/01/2022 16 8 (A)    MPV 07/01/2022 9 7     Platelets 61/46/8338 328     nRBC 07/01/2022 0     Neutrophils Relative 07/01/2022 68     Immat GRANS % 07/01/2022 1     Lymphocytes Relative 07/01/2022 22     Monocytes Relative 07/01/2022 8     Eosinophils Relative 07/01/2022 1     Basophils Relative 07/01/2022 0     Neutrophils Absolute 07/01/2022 5 85     Immature Grans Absolute 07/01/2022 0 06     Lymphocytes Absolute 07/01/2022 1 91     Monocytes Absolute 07/01/2022 0 73     Eosinophils Absolute 07/01/2022 0 07     Basophils Absolute 07/01/2022 0 03     Sodium 07/01/2022 144     Potassium 07/01/2022 4 6     Chloride 07/01/2022 106     CO2 07/01/2022 28     ANION GAP 07/01/2022 10     BUN 07/01/2022 24     Creatinine 07/01/2022 0 62     Glucose 07/01/2022 93     Calcium 07/01/2022 9 9     Corrected Calcium 07/01/2022 11 5 (A)    AST 07/01/2022 53 (A)    ALT 07/01/2022 12     Alkaline Phosphatase 07/01/2022 60     Total Protein 07/01/2022 6 2 (A)    Albumin 07/01/2022 2 0 (A)    Total Bilirubin 07/01/2022 1 13 (A)    eGFR 07/01/2022 82     Protime 07/01/2022 16 6 (A)    INR 07/01/2022 1 41 (A)    PTT 07/01/2022 34     Sodium 07/02/2022 146 (A)    Potassium 07/02/2022 3 0 (A)    Chloride 07/02/2022 110 (A)    CO2 07/02/2022 27     ANION GAP 07/02/2022 9     BUN 07/02/2022 20     Creatinine 07/02/2022 0 54 (A)    Glucose 07/02/2022 100     Glucose, Fasting 07/02/2022 100 (A)    Calcium 07/02/2022 9 0     eGFR 07/02/2022 86     WBC 07/02/2022 5 90     RBC 07/02/2022 3 29 (A)    Hemoglobin 07/02/2022 10 6 (A)    Hematocrit 07/02/2022 32 1 (A)    MCV 07/02/2022 98     MCH 07/02/2022 32 2     MCHC 07/02/2022 33 0     RDW 07/02/2022 16 6 (A)    MPV 07/02/2022 9 3     Platelets 20/33/2168 258     nRBC 07/02/2022 0     Neutrophils Relative 07/02/2022 65     Immat GRANS % 07/02/2022 0     Lymphocytes Relative 07/02/2022 24     Monocytes Relative 07/02/2022 10     Eosinophils Relative 07/02/2022 1     Basophils Relative 07/02/2022 0     Neutrophils Absolute 07/02/2022 3 77     Immature Grans Absolute 07/02/2022 0 02     Lymphocytes Absolute 07/02/2022 1 44     Monocytes Absolute 07/02/2022 0 58     Eosinophils Absolute 07/02/2022 0 07     Basophils Absolute 07/02/2022 0 02        Imaging Studies: I have personally reviewed pertinent imaging studies  ASSESSMENT & PLAN:  Protein calorie malnutrition  Advanced dementia  -Will plan EGD with PEG tube placement today   -Will appreciate dietary input after PEG tube is placed   -Eliquis on hold  -Patient is NPO  -Patient should be discharged home tomorrow morning  Patient will be seen examined by Robert Constantino PA-C  7/2/2022,10:49 AM

## 2022-07-02 NOTE — ANESTHESIA POSTPROCEDURE EVALUATION
Post-Op Assessment Note    CV Status:  Stable  Pain Score: 0    Pain management: adequate     Mental Status:  Alert and awake   Hydration Status:  Stable   PONV Controlled:  None   Airway Patency:  Patent and adequate      Post Op Vitals Reviewed: Yes      Staff: CRNA, Anesthesiologist         No complications documented      BP   118/58   Temp      Pulse  96   Resp   18   SpO2   100%

## 2022-07-02 NOTE — ANESTHESIA PREPROCEDURE EVALUATION
Procedure:  EGD    Relevant Problems   CARDIO   (+) DVT (deep venous thrombosis) (HCC)   (+) Essential hypertension      NEURO/PSYCH   (+) Dementia with behavioral disturbance (HCC)      Echo 55-60%, mild AI, normal RV 5/16/2022    Lab Results   Component Value Date    WBC 5 90 07/02/2022    HGB 10 6 (L) 07/02/2022    HCT 32 1 (L) 07/02/2022    MCV 98 07/02/2022     07/02/2022     Lab Results   Component Value Date    SODIUM 146 (H) 07/02/2022    K 3 0 (L) 07/02/2022     (H) 07/02/2022    CO2 27 07/02/2022    BUN 20 07/02/2022    CREATININE 0 54 (L) 07/02/2022    GLUC 100 07/02/2022    CALCIUM 9 0 07/02/2022       Physical Exam    Airway    Mallampati score: II  TM Distance: <3 FB  Neck ROM: full     Dental   Comment: Very poor dentition  ,     Cardiovascular  Rhythm: irregular, Rate: normal,     Pulmonary  Breath sounds clear to auscultation,     Other Findings        Anesthesia Plan  ASA Score- 3     Anesthesia Type- general with ASA Monitors  Additional Monitors:   Airway Plan: ETT  Comment: Risks/benefits and alternatives discussed with patient including possible PONV, sore throat, damage to teeth/lips/gums, and possibility of rare anesthetic and surgical emergencies including but not limited to heart attack, stroke, and/or death  All questions were answered          Plan Factors-Exercise tolerance (METS): <4 METS  Chart reviewed  Existing labs reviewed  Patient summary reviewed  Patient is not a current smoker  Induction- intravenous  Postoperative Plan- Plan for postoperative opioid use  Planned trial extubation    Informed Consent- Anesthetic plan and risks discussed with son (telephone consent obtained from son as patient has underlying dementia thus unable to consent for herself)  I personally reviewed this patient with the CRNA  Discussed and agreed on the Anesthesia Plan with the CRNA  Khurram Montesinos

## 2022-07-03 PROBLEM — E87.8 ELECTROLYTE DISTURBANCE: Status: ACTIVE | Noted: 2022-07-03

## 2022-07-03 PROBLEM — E87.6 HYPOKALEMIA: Status: ACTIVE | Noted: 2022-07-03

## 2022-07-03 PROBLEM — E46 PROTEIN MALNUTRITION (HCC): Status: RESOLVED | Noted: 2021-08-04 | Resolved: 2022-07-03

## 2022-07-03 PROBLEM — E43 SEVERE PROTEIN-CALORIE MALNUTRITION (HCC): Status: ACTIVE | Noted: 2022-07-03

## 2022-07-03 LAB
ALBUMIN SERPL BCP-MCNC: 1.7 G/DL (ref 3.5–5)
ALP SERPL-CCNC: 52 U/L (ref 46–116)
ALT SERPL W P-5'-P-CCNC: 11 U/L (ref 12–78)
ANION GAP SERPL CALCULATED.3IONS-SCNC: 10 MMOL/L (ref 4–13)
ANION GAP SERPL CALCULATED.3IONS-SCNC: 8 MMOL/L (ref 4–13)
AST SERPL W P-5'-P-CCNC: 27 U/L (ref 5–45)
BILIRUB SERPL-MCNC: 0.97 MG/DL (ref 0.2–1)
BUN SERPL-MCNC: 15 MG/DL (ref 5–25)
BUN SERPL-MCNC: 17 MG/DL (ref 5–25)
CALCIUM ALBUM COR SERPL-MCNC: 10.4 MG/DL (ref 8.3–10.1)
CALCIUM SERPL-MCNC: 8.6 MG/DL (ref 8.3–10.1)
CALCIUM SERPL-MCNC: 8.8 MG/DL (ref 8.3–10.1)
CHLORIDE SERPL-SCNC: 110 MMOL/L (ref 100–108)
CHLORIDE SERPL-SCNC: 110 MMOL/L (ref 100–108)
CO2 SERPL-SCNC: 25 MMOL/L (ref 21–32)
CO2 SERPL-SCNC: 26 MMOL/L (ref 21–32)
CREAT SERPL-MCNC: 0.53 MG/DL (ref 0.6–1.3)
CREAT SERPL-MCNC: 0.55 MG/DL (ref 0.6–1.3)
ERYTHROCYTE [DISTWIDTH] IN BLOOD BY AUTOMATED COUNT: 16.6 % (ref 11.6–15.1)
GFR SERPL CREATININE-BSD FRML MDRD: 85 ML/MIN/1.73SQ M
GFR SERPL CREATININE-BSD FRML MDRD: 86 ML/MIN/1.73SQ M
GLUCOSE SERPL-MCNC: 84 MG/DL (ref 65–140)
GLUCOSE SERPL-MCNC: 96 MG/DL (ref 65–140)
HCT VFR BLD AUTO: 32.1 % (ref 34.8–46.1)
HGB BLD-MCNC: 10.7 G/DL (ref 11.5–15.4)
MAGNESIUM SERPL-MCNC: 1.5 MG/DL (ref 1.6–2.6)
MCH RBC QN AUTO: 32.1 PG (ref 26.8–34.3)
MCHC RBC AUTO-ENTMCNC: 33.3 G/DL (ref 31.4–37.4)
MCV RBC AUTO: 96 FL (ref 82–98)
PHOSPHATE SERPL-MCNC: 2.6 MG/DL (ref 2.3–4.1)
PLATELET # BLD AUTO: 270 THOUSANDS/UL (ref 149–390)
PMV BLD AUTO: 9.7 FL (ref 8.9–12.7)
POTASSIUM SERPL-SCNC: 2.7 MMOL/L (ref 3.5–5.3)
POTASSIUM SERPL-SCNC: 3 MMOL/L (ref 3.5–5.3)
PROT SERPL-MCNC: 5.1 G/DL (ref 6.4–8.2)
RBC # BLD AUTO: 3.33 MILLION/UL (ref 3.81–5.12)
SODIUM SERPL-SCNC: 143 MMOL/L (ref 136–145)
SODIUM SERPL-SCNC: 146 MMOL/L (ref 136–145)
WBC # BLD AUTO: 6.51 THOUSAND/UL (ref 4.31–10.16)

## 2022-07-03 PROCEDURE — 85027 COMPLETE CBC AUTOMATED: CPT

## 2022-07-03 PROCEDURE — 80048 BASIC METABOLIC PNL TOTAL CA: CPT | Performed by: PHYSICIAN ASSISTANT

## 2022-07-03 PROCEDURE — 99232 SBSQ HOSP IP/OBS MODERATE 35: CPT | Performed by: PHYSICIAN ASSISTANT

## 2022-07-03 PROCEDURE — 84100 ASSAY OF PHOSPHORUS: CPT

## 2022-07-03 PROCEDURE — 83735 ASSAY OF MAGNESIUM: CPT

## 2022-07-03 PROCEDURE — 80053 COMPREHEN METABOLIC PANEL: CPT

## 2022-07-03 PROCEDURE — 99233 SBSQ HOSP IP/OBS HIGH 50: CPT

## 2022-07-03 RX ORDER — MAGNESIUM SULFATE HEPTAHYDRATE 40 MG/ML
2 INJECTION, SOLUTION INTRAVENOUS ONCE
Status: COMPLETED | OUTPATIENT
Start: 2022-07-03 | End: 2022-07-04

## 2022-07-03 RX ORDER — POTASSIUM CHLORIDE 14.9 MG/ML
20 INJECTION INTRAVENOUS
Status: COMPLETED | OUTPATIENT
Start: 2022-07-03 | End: 2022-07-04

## 2022-07-03 RX ADMIN — MAGNESIUM SULFATE HEPTAHYDRATE 2 G: 40 INJECTION, SOLUTION INTRAVENOUS at 12:39

## 2022-07-03 RX ADMIN — POTASSIUM CHLORIDE 20 MEQ: 200 INJECTION, SOLUTION INTRAVENOUS at 07:30

## 2022-07-03 RX ADMIN — POTASSIUM CHLORIDE 20 MEQ: 200 INJECTION, SOLUTION INTRAVENOUS at 09:30

## 2022-07-03 RX ADMIN — APIXABAN 5 MG: 5 TABLET, FILM COATED ORAL at 17:41

## 2022-07-03 RX ADMIN — APIXABAN 5 MG: 5 TABLET, FILM COATED ORAL at 09:31

## 2022-07-03 RX ADMIN — ACETAMINOPHEN 650 MG: 325 TABLET, FILM COATED ORAL at 20:31

## 2022-07-03 NOTE — ASSESSMENT & PLAN NOTE
· Potassium  ·  Noted to be 2 7 on a m  Labs, overnight PA notified by nursing  · Received 20 mEq IV K x2 doses  · Placed on telemetry  · Recheck a m  K  · Check phosphorus and magnesium levels  · Magnesium 1 5  · Replete with IV Mg  · Recheck a m   Magnesium  · Phosphorus level normal  · Suspect refeeding syndrome in the setting of PEG tube placement 07/02/2022  · Nutrition recommendations appreciated

## 2022-07-03 NOTE — ASSESSMENT & PLAN NOTE
· Patient with generalized weakness and according to son, not pleased with patient's care at her prior nursing facility  · PT/OT (pending)  · Case management for placement

## 2022-07-03 NOTE — PROGRESS NOTES
Progress Note  Quinton Wiesman 81 y o  female MRN: [id number]  Unit/Bed#: -[facility] Encounter: [id number]    Subjective:  Patient is status post EGD with PEG tube placement yesterday  Patient tolerated procedure well  Patient's PEG site looks good this morning  Objective:     Vitals:   Vitals:    07/03/22 1024   BP:    Pulse: 88   Resp:    Temp:    SpO2: 96%   ,Body mass index is 24 02 kg/m²        Intake/Output Summary (Last 24 hours) at 7/3/2022 1041  Last data filed at 7/3/2022 0543  Gross per 24 hour   Intake 100 ml   Output 856 ml   Net -756 ml       Physical Exam:     General Appearance: Alert, appears stated age and cooperative  Lungs: Clear to auscultation bilaterally, no rales or rhonchi, no labored breathing/accessory muscle use  Heart: Regular rate and rhythm, S1, S2 normal, no murmur, click, rub or gallop  Abdomen: Soft, non-tender, non-distended; bowel sounds normal; no masses or no organomegaly  Extremities: No cyanosis, edema    Invasive Devices  Report    Peripheral Intravenous Line  Duration           Peripheral IV 07/01/22 Right Antecubital 1 day          Drain  Duration           Gastrostomy/Enterostomy Percutaneous Endoscopic Gastrostomy (PEG) 20 Fr  LUQ <1 day                Lab Results:  Admission on 07/01/2022   Component Date Value    WBC 07/01/2022 8 65     RBC 07/01/2022 3 86     Hemoglobin 07/01/2022 12 3     Hematocrit 07/01/2022 37 8     MCV 07/01/2022 98     MCH 07/01/2022 31 9     MCHC 07/01/2022 32 5     RDW 07/01/2022 16 8 (A)    MPV 07/01/2022 9 7     Platelets 06/12/0318 328     nRBC 07/01/2022 0     Neutrophils Relative 07/01/2022 68     Immat GRANS % 07/01/2022 1     Lymphocytes Relative 07/01/2022 22     Monocytes Relative 07/01/2022 8     Eosinophils Relative 07/01/2022 1     Basophils Relative 07/01/2022 0     Neutrophils Absolute 07/01/2022 5 85     Immature Grans Absolute 07/01/2022 0 06     Lymphocytes Absolute 07/01/2022 1 91     Monocytes Absolute 07/01/2022 0 73     Eosinophils Absolute 07/01/2022 0 07     Basophils Absolute 07/01/2022 0 03     Sodium 07/01/2022 144     Potassium 07/01/2022 4 6     Chloride 07/01/2022 106     CO2 07/01/2022 28     ANION GAP 07/01/2022 10     BUN 07/01/2022 24     Creatinine 07/01/2022 0 62     Glucose 07/01/2022 93     Calcium 07/01/2022 9 9     Corrected Calcium 07/01/2022 11 5 (A)    AST 07/01/2022 53 (A)    ALT 07/01/2022 12     Alkaline Phosphatase 07/01/2022 60     Total Protein 07/01/2022 6 2 (A)    Albumin 07/01/2022 2 0 (A)    Total Bilirubin 07/01/2022 1 13 (A)    eGFR 07/01/2022 82     Protime 07/01/2022 16 6 (A)    INR 07/01/2022 1 41 (A)    PTT 07/01/2022 34     Sodium 07/02/2022 146 (A)    Potassium 07/02/2022 3 0 (A)    Chloride 07/02/2022 110 (A)    CO2 07/02/2022 27     ANION GAP 07/02/2022 9     BUN 07/02/2022 20     Creatinine 07/02/2022 0 54 (A)    Glucose 07/02/2022 100     Glucose, Fasting 07/02/2022 100 (A)    Calcium 07/02/2022 9 0     eGFR 07/02/2022 86     WBC 07/02/2022 5 90     RBC 07/02/2022 3 29 (A)    Hemoglobin 07/02/2022 10 6 (A)    Hematocrit 07/02/2022 32 1 (A)    MCV 07/02/2022 98     MCH 07/02/2022 32 2     MCHC 07/02/2022 33 0     RDW 07/02/2022 16 6 (A)    MPV 07/02/2022 9 3     Platelets 57/93/0089 258     nRBC 07/02/2022 0     Neutrophils Relative 07/02/2022 65     Immat GRANS % 07/02/2022 0     Lymphocytes Relative 07/02/2022 24     Monocytes Relative 07/02/2022 10     Eosinophils Relative 07/02/2022 1     Basophils Relative 07/02/2022 0     Neutrophils Absolute 07/02/2022 3 77     Immature Grans Absolute 07/02/2022 0 02     Lymphocytes Absolute 07/02/2022 1 44     Monocytes Absolute 07/02/2022 0 58     Eosinophils Absolute 07/02/2022 0 07     Basophils Absolute 07/02/2022 0 02     WBC 07/03/2022 6 51     RBC 07/03/2022 3 33 (A)    Hemoglobin 07/03/2022 10 7 (A)    Hematocrit 07/03/2022 32 1 (A)    MCV 07/03/2022 96     MCH 07/03/2022 32 1     MCHC 07/03/2022 33 3     RDW 07/03/2022 16 6 (A)    Platelets 82/13/9555 270     MPV 07/03/2022 9 7     Sodium 07/03/2022 146 (A)    Potassium 07/03/2022 2 7 (A)    Chloride 07/03/2022 110 (A)    CO2 07/03/2022 26     ANION GAP 07/03/2022 10     BUN 07/03/2022 17     Creatinine 07/03/2022 0 53 (A)    Glucose 07/03/2022 84     Calcium 07/03/2022 8 6     Corrected Calcium 07/03/2022 10 4 (A)    AST 07/03/2022 27     ALT 07/03/2022 11 (A)    Alkaline Phosphatase 07/03/2022 52     Total Protein 07/03/2022 5 1 (A)    Albumin 07/03/2022 1 7 (A)    Total Bilirubin 07/03/2022 0 97     eGFR 07/03/2022 86     Phosphorus 07/03/2022 2 6     Magnesium 07/03/2022 1 5 (A)       Imaging Studies:   I have personally reviewed pertinent imaging studies  No results found  Assessment & Plan  Protein calorie malnutrition  Advanced dementia  -Patient is status post EGD with PEG tube placement yesterday  Patient tolerated the procedure well  Patient's PEG tube is clean dry and intact today     -Patient can begin PEG tube feeds  GI will sign off please re-consult p r n       Patient will be seen examined by Ana Bourgeois PA-C  7/3/2022,10:41 AM

## 2022-07-03 NOTE — QUICK NOTE
Notified by RN patient's potassium dropped to 2 7 this morning      Will place on telemetry  Added 2 doses of IV potassium chloride 20 mEq q 2 hours    Continue to monitor potassium

## 2022-07-03 NOTE — PROGRESS NOTES
[address] [facility]  Progress Note - Faith Herrera 1935, 81 y o  female MRN: [id number]  Unit/Bed#: -01 Encounter: [id number]  Primary Care Provider: Yoselin Abreu   Date and time admitted to hospital: 7/1/2022  4:10 PM    Electrolyte disturbance  Assessment & Plan  · Potassium  ·  Noted to be 2 7 on a m  Labs, overnight PA notified by nursing  · Received 20 mEq IV K x2 doses  · Placed on telemetry  · Recheck a m  K  · Check phosphorus and magnesium levels  · Magnesium 1 5  · Replete with IV Mg  · Recheck a m  Magnesium  · Phosphorus level normal  · Suspect refeeding syndrome in the setting of PEG tube placement 07/02/2022  · Nutrition recommendations appreciated    Generalized weakness  Assessment & Plan  · Patient with generalized weakness and according to son, not pleased with patient's care at her prior nursing facility  · PT/OT (pending)  · Case management for placement    Dementia with behavioral disturbance ([facility] [address] )  Assessment & Plan  · Currently at baseline  · Delirium precautions    Essential hypertension  Assessment & Plan  · Well controlled  · Not on home meds  · Continue to monitor    DVT (deep venous thrombosis) ([facility] [address] )  Assessment & Plan  · Eliquis previously on hold for PEG tube placement, now restarted  * Severe protein-calorie malnutrition ([facility] [address] )  Assessment & Plan  Malnutrition Findings:   Adult Malnutrition type: Chronic illness  Adult Degree of Malnutrition: Other severe protein calorie malnutrition  Malnutrition Characteristics: Fat loss, Inadequate energy, Weight loss                  360 Statement: Malnutrition related to dementia as evidenced by 14#(9%) weight loss from 5/14/# to 7/1/#, <75% energy intake compared to estimated needs >1 month, temporal wasting, sunken orbitals  BMI Findings: Body mass index is 24 02 kg/m²       · Nutrition recommendations appreciated  · Status post PEG tube placement 07/02/2022        VTE Pharmacologic Prophylaxis:   Eliquis    Patient Centered Rounds: I performed bedside rounds with nursing staff today  Discussions with Specialists or Other Care Team Provider:  Case Management, nursing    Education and Discussions with Family / Patient: Updated  (son) via phone  Time Spent for Care: 30 minutes  More than 50% of total time spent on counseling and coordination of care as described above  Current Length of Stay: 1 day(s)  Current Patient Status: Inpatient   Certification Statement: The patient will continue to require additional inpatient hospital stay due to Refeeding syndrome, post acute placement  Discharge Plan: Pending clinical course and disposition planning    Code Status: Level 1 - Full Code    Subjective:   Patient seen and examined at bedside  No acute events overnight  Objective:     Vitals:   Temp (24hrs), Av °F (36 7 °C), Min:97 7 °F (36 5 °C), Max:98 4 °F (36 9 °C)    Temp:  [97 7 °F (36 5 °C)-98 4 °F (36 9 °C)] 98 1 °F (36 7 °C)  HR:  [] 113  Resp:  [16-22] 22  BP: (100-151)/(54-89) 144/89  SpO2:  [96 %-100 %] 97 %  Body mass index is 24 02 kg/m²  Input and Output Summary (last 24 hours): Intake/Output Summary (Last 24 hours) at 7/3/2022 0941  Last data filed at 7/3/2022 0543  Gross per 24 hour   Intake 100 ml   Output 856 ml   Net -756 ml       Physical Exam:   Physical Exam  Vitals and nursing note reviewed  Constitutional:       General: She is not in acute distress  Appearance: She is normal weight  She is not toxic-appearing  HENT:      Head: Normocephalic and atraumatic  Mouth/Throat:      Mouth: Mucous membranes are moist    Eyes:      Conjunctiva/sclera: Conjunctivae normal    Cardiovascular:      Rate and Rhythm: Normal rate and regular rhythm  Pulses: Normal pulses  Heart sounds: Normal heart sounds  Pulmonary:      Effort: Pulmonary effort is normal  No respiratory distress  Breath sounds: Normal breath sounds   No wheezing, rhonchi or rales  Abdominal:      General: Bowel sounds are normal  There is no distension  Palpations: Abdomen is soft  Tenderness: There is no abdominal tenderness  Comments: PEG tube in place   Musculoskeletal:      Cervical back: Neck supple  Right lower leg: No edema  Left lower leg: No edema  Skin:     General: Skin is warm and dry  Neurological:      Mental Status: She is alert  Mental status is at baseline  Cranial Nerves: No cranial nerve deficit  Sensory: No sensory deficit  Motor: No weakness  Coordination: Coordination normal    Psychiatric:         Mood and Affect: Mood normal          Behavior: Behavior normal          Thought Content:  Thought content normal           Additional Data:     Labs:  Results from last 7 days   Lab Units 07/03/22  0512 07/02/22  0640   WBC Thousand/uL 6 51 5 90   HEMOGLOBIN g/dL 10 7* 10 6*   HEMATOCRIT % 32 1* 32 1*   PLATELETS Thousands/uL 270 258   NEUTROS PCT %  --  65   LYMPHS PCT %  --  24   MONOS PCT %  --  10   EOS PCT %  --  1     Results from last 7 days   Lab Units 07/03/22  0512   SODIUM mmol/L 146*   POTASSIUM mmol/L 2 7*   CHLORIDE mmol/L 110*   CO2 mmol/L 26   BUN mg/dL 17   CREATININE mg/dL 0 53*   ANION GAP mmol/L 10   CALCIUM mg/dL 8 6   ALBUMIN g/dL 1 7*   TOTAL BILIRUBIN mg/dL 0 97   ALK PHOS U/L 52   ALT U/L 11*   AST U/L 27   GLUCOSE RANDOM mg/dL 84     Results from last 7 days   Lab Units 07/01/22  1718   INR  1 41*                   Lines/Drains:  Invasive Devices  Report    Peripheral Intravenous Line  Duration           Peripheral IV 07/01/22 Right Antecubital 1 day          Drain  Duration           Gastrostomy/Enterostomy Percutaneous Endoscopic Gastrostomy (PEG) 20 Fr  LUQ <1 day                  Telemetry:  Telemetry Orders (From admission, onward)             24 Hour Telemetry Monitoring  Continuous x 24 Hours (Telem)        References:    Telemetry Guidelines   Question:  Reason for 24 Hour Telemetry  Answer:  Metabolic/Electrolyte Disturbance with High Probability of Dysrhythmia (K level <3 or >6, or KCL infusion >=10mEq/hr)                 Telemetry Reviewed: Normal Sinus Rhythm  Indication for Continued Telemetry Use: Metabolic/electrolyte disturbance with high probability of dysrhythmia             Imaging: No pertinent imaging reviewed  Recent Cultures (last 7 days):         Last 24 Hours Medication List:   Current Facility-Administered Medications   Medication Dose Route Frequency Provider Last Rate    acetaminophen  650 mg Oral Q6H PRN Ismael Chaudhary MD      apixaban  5 mg Oral BID Alfredo Aguirre PA-C      magnesium sulfate  2 g Intravenous Once Alfredo Aguirre PA-C      potassium chloride  20 mEq Intravenous Q2H Sara Gu PA-C 20 mEq (07/03/22 0730)    sodium chloride  75 mL/hr Intravenous Continuous Ismael Chaudhary MD 75 mL/hr (07/01/22 1909)        Today, Patient Was Seen By: Alfredo Aguirre PA-C    **Please Note: This note may have been constructed using a voice recognition system  **

## 2022-07-03 NOTE — ASSESSMENT & PLAN NOTE
Malnutrition Findings:   Adult Malnutrition type: Chronic illness  Adult Degree of Malnutrition: Other severe protein calorie malnutrition  Malnutrition Characteristics: Fat loss, Inadequate energy, Weight loss                  360 Statement: Malnutrition related to dementia as evidenced by 14#(9%) weight loss from 5/14/# to 7/1/#, <75% energy intake compared to estimated needs >1 month, temporal wasting, sunken orbitals  BMI Findings: Body mass index is 24 02 kg/m²       · Nutrition recommendations appreciated  · Status post PEG tube placement 07/02/2022

## 2022-07-04 LAB
ALBUMIN SERPL BCP-MCNC: 1.8 G/DL (ref 3.5–5)
ALP SERPL-CCNC: 57 U/L (ref 46–116)
ALT SERPL W P-5'-P-CCNC: 9 U/L (ref 12–78)
ANION GAP SERPL CALCULATED.3IONS-SCNC: 10 MMOL/L (ref 4–13)
ANION GAP SERPL CALCULATED.3IONS-SCNC: 8 MMOL/L (ref 4–13)
AST SERPL W P-5'-P-CCNC: 30 U/L (ref 5–45)
BILIRUB SERPL-MCNC: 0.95 MG/DL (ref 0.2–1)
BUN SERPL-MCNC: 12 MG/DL (ref 5–25)
BUN SERPL-MCNC: 13 MG/DL (ref 5–25)
CALCIUM ALBUM COR SERPL-MCNC: 10.3 MG/DL (ref 8.3–10.1)
CALCIUM SERPL-MCNC: 8.4 MG/DL (ref 8.3–10.1)
CALCIUM SERPL-MCNC: 8.5 MG/DL (ref 8.3–10.1)
CHLORIDE SERPL-SCNC: 111 MMOL/L (ref 100–108)
CHLORIDE SERPL-SCNC: 112 MMOL/L (ref 100–108)
CO2 SERPL-SCNC: 24 MMOL/L (ref 21–32)
CO2 SERPL-SCNC: 24 MMOL/L (ref 21–32)
CREAT SERPL-MCNC: 0.54 MG/DL (ref 0.6–1.3)
CREAT SERPL-MCNC: 0.55 MG/DL (ref 0.6–1.3)
ERYTHROCYTE [DISTWIDTH] IN BLOOD BY AUTOMATED COUNT: 17 % (ref 11.6–15.1)
GFR SERPL CREATININE-BSD FRML MDRD: 85 ML/MIN/1.73SQ M
GFR SERPL CREATININE-BSD FRML MDRD: 86 ML/MIN/1.73SQ M
GLUCOSE SERPL-MCNC: 100 MG/DL (ref 65–140)
GLUCOSE SERPL-MCNC: 93 MG/DL (ref 65–140)
HCT VFR BLD AUTO: 33.7 % (ref 34.8–46.1)
HGB BLD-MCNC: 11.2 G/DL (ref 11.5–15.4)
MAGNESIUM SERPL-MCNC: 1.9 MG/DL (ref 1.6–2.6)
MCH RBC QN AUTO: 32.1 PG (ref 26.8–34.3)
MCHC RBC AUTO-ENTMCNC: 33.2 G/DL (ref 31.4–37.4)
MCV RBC AUTO: 97 FL (ref 82–98)
PLATELET # BLD AUTO: 270 THOUSANDS/UL (ref 149–390)
PMV BLD AUTO: 9.3 FL (ref 8.9–12.7)
POTASSIUM SERPL-SCNC: 3.4 MMOL/L (ref 3.5–5.3)
POTASSIUM SERPL-SCNC: 3.6 MMOL/L (ref 3.5–5.3)
PROT SERPL-MCNC: 5.3 G/DL (ref 6.4–8.2)
RBC # BLD AUTO: 3.49 MILLION/UL (ref 3.81–5.12)
SODIUM SERPL-SCNC: 143 MMOL/L (ref 136–145)
SODIUM SERPL-SCNC: 146 MMOL/L (ref 136–145)
WBC # BLD AUTO: 6.83 THOUSAND/UL (ref 4.31–10.16)

## 2022-07-04 PROCEDURE — 80048 BASIC METABOLIC PNL TOTAL CA: CPT | Performed by: NURSE PRACTITIONER

## 2022-07-04 PROCEDURE — 85027 COMPLETE CBC AUTOMATED: CPT

## 2022-07-04 PROCEDURE — 80053 COMPREHEN METABOLIC PANEL: CPT

## 2022-07-04 PROCEDURE — 99232 SBSQ HOSP IP/OBS MODERATE 35: CPT | Performed by: NURSE PRACTITIONER

## 2022-07-04 PROCEDURE — 83735 ASSAY OF MAGNESIUM: CPT

## 2022-07-04 RX ORDER — DEXTROSE, SODIUM CHLORIDE, AND POTASSIUM CHLORIDE 5; .45; .15 G/100ML; G/100ML; G/100ML
100 INJECTION INTRAVENOUS CONTINUOUS
Status: DISCONTINUED | OUTPATIENT
Start: 2022-07-04 | End: 2022-07-04

## 2022-07-04 RX ORDER — DEXTROSE MONOHYDRATE 50 MG/ML
75 INJECTION, SOLUTION INTRAVENOUS CONTINUOUS
Status: DISCONTINUED | OUTPATIENT
Start: 2022-07-04 | End: 2022-07-05

## 2022-07-04 RX ORDER — POTASSIUM CHLORIDE 14.9 MG/ML
20 INJECTION INTRAVENOUS ONCE
Status: COMPLETED | OUTPATIENT
Start: 2022-07-04 | End: 2022-07-04

## 2022-07-04 RX ORDER — SODIUM HYPOCHLORITE 1.25 MG/ML
SOLUTION TOPICAL DAILY
Status: DISCONTINUED | OUTPATIENT
Start: 2022-07-05 | End: 2022-07-11 | Stop reason: HOSPADM

## 2022-07-04 RX ORDER — ACETAMINOPHEN 325 MG/1
650 TABLET ORAL EVERY 6 HOURS PRN
Status: DISCONTINUED | OUTPATIENT
Start: 2022-07-04 | End: 2022-07-11 | Stop reason: HOSPADM

## 2022-07-04 RX ORDER — KETOROLAC TROMETHAMINE 30 MG/ML
15 INJECTION, SOLUTION INTRAMUSCULAR; INTRAVENOUS ONCE
Status: COMPLETED | OUTPATIENT
Start: 2022-07-04 | End: 2022-07-04

## 2022-07-04 RX ADMIN — KETOROLAC TROMETHAMINE 15 MG: 30 INJECTION, SOLUTION INTRAMUSCULAR at 23:10

## 2022-07-04 RX ADMIN — APIXABAN 5 MG: 5 TABLET, FILM COATED ORAL at 18:43

## 2022-07-04 RX ADMIN — POTASSIUM CHLORIDE 20 MEQ: 14.9 INJECTION, SOLUTION INTRAVENOUS at 12:18

## 2022-07-04 RX ADMIN — ACETAMINOPHEN 650 MG: 325 TABLET, FILM COATED ORAL at 14:40

## 2022-07-04 RX ADMIN — DEXTROSE, SODIUM CHLORIDE, AND POTASSIUM CHLORIDE 100 ML/HR: 5; .45; .15 INJECTION INTRAVENOUS at 03:29

## 2022-07-04 RX ADMIN — APIXABAN 5 MG: 5 TABLET, FILM COATED ORAL at 12:18

## 2022-07-04 RX ADMIN — POTASSIUM CHLORIDE 20 MEQ: 14.9 INJECTION, SOLUTION INTRAVENOUS at 03:30

## 2022-07-04 RX ADMIN — DEXTROSE 75 ML/HR: 5 SOLUTION INTRAVENOUS at 14:43

## 2022-07-04 NOTE — PLAN OF CARE
Problem: Potential for Falls  Goal: Patient will remain free of falls  Description: INTERVENTIONS:  - Educate patient/family on patient safety including physical limitations  - Instruct patient to call for assistance with activity   - Consult OT/PT to assist with strengthening/mobility   - Keep Call bell within reach  - Keep bed low and locked with side rails adjusted as appropriate  - Keep care items and personal belongings within reach  - Initiate and maintain comfort rounds  - Make Fall Risk Sign visible to staff  - Offer Toileting every 2 Hours, in advance of need  - Initiate/Maintain bed alarm  - Obtain necessary fall risk management equipment  - Apply yellow socks and bracelet for high fall risk patients  - Consider moving patient to room near nurses station  Outcome: Progressing      Problem: Prexisting or High Potential for Compromised Skin Integrity  Goal: Skin integrity is maintained or improved  Description: INTERVENTIONS:  - Identify patients at risk for skin breakdown  - Assess and monitor skin integrity  - Assess and monitor nutrition and hydration status  - Monitor labs   - Assess for incontinence   - Turn and reposition patient  - Assist with mobility/ambulation  - Relieve pressure over bony prominences  - Avoid friction and shearing  - Provide appropriate hygiene as needed including keeping skin clean and dry  - Evaluate need for skin moisturizer/barrier cream  - Collaborate with interdisciplinary team   - Patient/family teaching  - Consider wound care consult   Outcome: Progressing     Problem: GASTROINTESTINAL - ADULT  Goal: Minimal or absence of nausea and/or vomiting  Description: INTERVENTIONS:  - Administer IV fluids if ordered to ensure adequate hydration  - Maintain NPO status until nausea and vomiting are resolved  - Nasogastric tube if ordered  - Administer ordered antiemetic medications as needed  - Provide nonpharmacologic comfort measures as appropriate  - Advance diet as tolerated, if ordered  - Consider nutrition services referral to assist patient with adequate nutrition and appropriate food choices  Outcome: Progressing  Goal: Maintains or returns to baseline bowel function  Description: INTERVENTIONS:  - Assess bowel function  - Encourage oral fluids to ensure adequate hydration  - Administer IV fluids if ordered to ensure adequate hydration  - Administer ordered medications as needed  - Encourage mobilization and activity  - Consider nutritional services referral to assist patient with adequate nutrition and appropriate food choices  Outcome: Progressing  Goal: Maintains adequate nutritional intake  Description: INTERVENTIONS:  - Monitor percentage of each meal consumed  - Identify factors contributing to decreased intake, treat as appropriate  - Assist with meals as needed  - Monitor I&O, weight, and lab values if indicated  - Obtain nutrition services referral as needed  Outcome: Progressing  Goal: Establish and maintain optimal ostomy function  Description: INTERVENTIONS:  - Assess bowel function  - Encourage oral fluids to ensure adequate hydration  - Administer IV fluids if ordered to ensure adequate hydration   - Administer ordered medications as needed  - Encourage mobilization and activity  - Nutrition services referral to assist patient with appropriate food choices  - Assess stoma site  - Consider wound care consult   Outcome: Progressing  Goal: Oral mucous membranes remain intact  Description: INTERVENTIONS  - Assess oral mucosa and hygiene practices  - Implement preventative oral hygiene regimen  - Implement oral medicated treatments as ordered  - Initiate Nutrition services referral as needed  Outcome: Progressing     Problem: MUSCULOSKELETAL - ADULT  Goal: Maintain or return mobility to safest level of function  Description: INTERVENTIONS:  - Assess patient's ability to carry out ADLs; assess patient's baseline for ADL function and identify physical deficits which impact ability to perform ADLs (bathing, care of mouth/teeth, toileting, grooming, dressing, etc )  - Assess/evaluate cause of self-care deficits   - Assess range of motion  - Assess patient's mobility  - Assess patient's need for assistive devices and provide as appropriate  - Encourage maximum independence but intervene and supervise when necessary  - Involve family in performance of ADLs  - Assess for home care needs following discharge   - Consider OT consult to assist with ADL evaluation and planning for discharge  - Provide patient education as appropriate  Outcome: Progressing  Goal: Maintain proper alignment of affected body part  Description: INTERVENTIONS:  - Support, maintain and protect limb and body alignment  - Provide patient/ family with appropriate education  Outcome: Progressing     Problem: PAIN - ADULT  Goal: Verbalizes/displays adequate comfort level or baseline comfort level  Description: Interventions:  - Encourage patient to monitor pain and request assistance  - Assess pain using appropriate pain scale  - Administer analgesics based on type and severity of pain and evaluate response  - Implement non-pharmacological measures as appropriate and evaluate response  - Consider cultural and social influences on pain and pain management  - Notify physician/advanced practitioner if interventions unsuccessful or patient reports new pain  Outcome: Progressing     Problem: INFECTION - ADULT  Goal: Absence or prevention of progression during hospitalization  Description: INTERVENTIONS:  - Assess and monitor for signs and symptoms of infection  - Monitor lab/diagnostic results  - Monitor all insertion sites, i e  indwelling lines, tubes, and drains  - Monitor endotracheal if appropriate and nasal secretions for changes in amount and color  - [place] appropriate cooling/warming therapies per order  - Administer medications as ordered  - Instruct and encourage patient and family to use good hand hygiene technique  - Identify and instruct in appropriate isolation precautions for identified infection/condition  Outcome: Progressing     Problem: DISCHARGE PLANNING  Goal: Discharge to home or other facility with appropriate resources  Description: INTERVENTIONS:  - Identify barriers to discharge w/patient and caregiver  - Arrange for needed discharge resources and transportation as appropriate  - Identify discharge learning needs (meds, wound care, etc )  - Arrange for interpretive services to assist at discharge as needed  - Refer to Case Management Department for coordinating discharge planning if the patient needs post-hospital services based on physician/advanced practitioner order or complex needs related to functional status, cognitive ability, or social support system  Outcome: Progressing     Problem: Nutrition/Hydration-ADULT  Goal: Nutrient/Hydration intake appropriate for improving, restoring or maintaining nutritional needs  Description: Monitor and assess patient's nutrition/hydration status for malnutrition  Collaborate with interdisciplinary team and initiate plan and interventions as ordered  Monitor patient's weight and dietary intake as ordered or per policy  Utilize nutrition screening tool and intervene as necessary     INTERVENTIONS:  - Monitor intake, urinary output, labs, and treatment plans  - Assess nutrition and hydration status and recommend course of action  - Recommend/ encourage appropriate diets, oral nutritional supplements, and vitamin/mineral supplements  - Order, calculate, and assess calorie counts as needed  - Recommend, monitor, and adjust tube feedings based on assessed needs  - Assess need for intravenous fluids  - Provide nutrition/hydration education as appropriate  - Include patient/family/caregiver in decisions related to nutrition  Outcome: Progressing     Problem: SKIN/TISSUE INTEGRITY - ADULT  Goal: Pressure injury heals and does not worsen  Description: Interventions:  - Implement low air loss mattress or specialty surface (Criteria met)  - Apply silicone foam dressing  - Use special pressure reducing interventions such as waffle cushion when in chair   - Apply fecal or urinary incontinence containment device   - Perform passive or active ROM every 2  - Turn and reposition patient & offload bony prominences every  2 hours   - Utilize friction reducing device or surface for transfers   - Consider consults to  interdisciplinary teams such as wound care nurse  - Consider nutrition services referral as needed  Outcome: Progressing

## 2022-07-04 NOTE — PROGRESS NOTES
[address] [facility]  Progress Note - Richa Landers 1935, 81 y o  female MRN: [id number]  Unit/Bed#: -01 Encounter: [id number]  Primary Care Provider: Olivier Tucker   Date and time admitted to hospital: 7/1/2022  4:10 PM    * Severe protein-calorie malnutrition ([address] )  Assessment & Plan  Malnutrition Findings:   Adult Malnutrition type: Chronic illness  Adult Degree of Malnutrition: Other severe protein calorie malnutrition  Malnutrition Characteristics: Fat loss, Inadequate energy, Weight loss              360 Statement: Malnutrition related to dementia as evidenced by 14#(9%) weight loss from 5/14/# to 7/1/#, <75% energy intake compared to estimated needs >1 month, temporal wasting, sunken orbitals  BMI Findings: Body mass index is 24 02 kg/m²  · Nutrition recommendations appreciated  · Status post PEG tube placement 07/02/2022  · Initiate patient on Jevity 1 2 at 10 mL/hr continuous via PEG tube; increase by 10 mL every 12 hours to goal rate of 60 mL/hr with 100 mL free water flush every 4 hours  Electrolyte disturbance  Assessment & Plan  · Potassium  · Improving with IV replacement, 3 4 this morning  · Give 1 more dose of IV K this morning  · No events noted on telemetry; will discontinue  · Magnesium 1 9; stable  · Phosphorus level normal  · Suspect refeeding syndrome in the setting of PEG tube placement 07/02/2022  · Nutrition recommendations appreciated    Generalized weakness  Assessment & Plan  · Patient with generalized weakness and according to son, not pleased with patient's care at her prior nursing facility  · PT/OT, ordered but not completed yet    · Case management for placement    Dementia with behavioral disturbance [facility])  Assessment & Plan  · Currently at baseline  · Delirium precautions    Essential hypertension  Assessment & Plan  · Well controlled, 123-134/80-90s  · Not on home meds  · Continue to monitor    DVT (deep venous thrombosis) ([facility] [address] )  Assessment & Plan  · Eliquis previously on hold for PEG tube placement, now restarted  VTE Pharmacologic Prophylaxis: VTE Score: 6 High Risk (Score >/= 5) - Pharmacological DVT Prophylaxis Ordered: apixaban (Eliquis)  Sequential Compression Devices Ordered  Patient Centered Rounds: I performed bedside rounds with nursing staff today  Discussions with Specialists or Other Care Team Provider: Wound care RN    Education and Discussions with Family / Patient: Attempted to update  (son) via phone  Left voicemail  Time Spent for Care: 20 minutes  More than 50% of total time spent on counseling and coordination of care as described above  Current Length of Stay: 2 day(s)  Current Patient Status: Inpatient   Certification Statement: The patient will continue to require additional inpatient hospital stay due to ongoing treatment in setting of tube feeds, concern for possible refeeding syndrome; need for STR  Discharge Plan: Anticipate discharge in 24-48 hrs to rehab facility  Code Status: Level 1 - Full Code    Subjective:   Patient resting in bed, denies complaints of chest pain, shortness of breath, stating that "i'm tired, I just woke up, stop asking me these questions"  Objective:     Vitals:   Temp (24hrs), Av °F (36 7 °C), Min:97 8 °F (36 6 °C), Max:98 3 °F (36 8 °C)    Temp:  [97 8 °F (36 6 °C)-98 3 °F (36 8 °C)] 98 °F (36 7 °C)  HR:  [] 102  Resp:  [16-18] 18  BP: (123-134)/(75-97) 134/75  SpO2:  [96 %-100 %] 96 %  Body mass index is 24 02 kg/m²  Input and Output Summary (last 24 hours): Intake/Output Summary (Last 24 hours) at 2022 1232  Last data filed at 2022 1005  Gross per 24 hour   Intake 660 ml   Output --   Net 660 ml       Physical Exam:   Physical Exam  Vitals and nursing note reviewed  Constitutional:       General: She is not in acute distress  Appearance: She is normal weight  She is ill-appearing     Cardiovascular: Rate and Rhythm: Normal rate  Pulses: Normal pulses  Heart sounds: Normal heart sounds  Pulmonary:      Effort: Pulmonary effort is normal       Breath sounds: Normal breath sounds  Abdominal:      General: Bowel sounds are normal       Palpations: Abdomen is soft  Musculoskeletal:         General: Normal range of motion  Skin:     General: Skin is warm and dry  Comments: Stage IV Sacrum   Neurological:      Mental Status: She is alert  Mental status is at baseline  She is disoriented  Psychiatric:         Mood and Affect: Mood normal           Additional Data:     Labs:  Results from last 7 days   Lab Units 22  0631 22  0512 22  0640   WBC Thousand/uL 6 83   < > 5 90   HEMOGLOBIN g/dL 11 2*   < > 10 6*   HEMATOCRIT % 33 7*   < > 32 1*   PLATELETS Thousands/uL 270   < > 258   NEUTROS PCT %  --   --  65   LYMPHS PCT %  --   --  24   MONOS PCT %  --   --  10   EOS PCT %  --   --  1    < > = values in this interval not displayed       Results from last 7 days   Lab Units 22  0631   SODIUM mmol/L 146*   POTASSIUM mmol/L 3 4*   CHLORIDE mmol/L 112*   CO2 mmol/L 24   BUN mg/dL 13   CREATININE mg/dL 0 55*   ANION GAP mmol/L 10   CALCIUM mg/dL 8 5   ALBUMIN g/dL 1 8*   TOTAL BILIRUBIN mg/dL 0 95   ALK PHOS U/L 57   ALT U/L 9*   AST U/L 30   GLUCOSE RANDOM mg/dL 93     Results from last 7 days   Lab Units 22  1718   INR  1 41*                   Lines/Drains:  Invasive Devices  Report    Peripheral Intravenous Line  Duration           Peripheral IV 22 Left Antecubital <1 day          Drain  Duration           Gastrostomy/Enterostomy Percutaneous Endoscopic Gastrostomy (PEG) 20 Fr  LUQ 1 day                  Telemetry:  Telemetry Orders (From admission, onward)             24 Hour Telemetry Monitoring  Continuous x 24 Hours (Telem)           References:    Telemetry Guidelines   Question:  Reason for 24 Hour Telemetry  Answer:  Metabolic/Electrolyte Disturbance with High Probability of Dysrhythmia (K level <3 or >6, or KCL infusion >=10mEq/hr)                 Telemetry Reviewed: Normal Sinus Rhythm  Indication for Continued Telemetry Use: No indication for continued use  Will discontinue  Imaging: No pertinent imaging reviewed  Recent Cultures (last 7 days):         Last 24 Hours Medication List:   Current Facility-Administered Medications   Medication Dose Route Frequency Provider Last Rate    acetaminophen  650 mg Oral Q6H PRN Chaka Almazan PA-C      apixaban  5 mg Oral BID Oksana Jeffery PA-C      potassium chloride  20 mEq Intravenous Once JUDIE Ruiz 20 mEq (07/04/22 1218)        Today, Patient Was Seen By: JUDIE Ruiz    **Please Note: This note may have been constructed using a voice recognition system  **

## 2022-07-04 NOTE — ASSESSMENT & PLAN NOTE
Malnutrition Findings:   Adult Malnutrition type: Chronic illness  Adult Degree of Malnutrition: Other severe protein calorie malnutrition  Malnutrition Characteristics: Fat loss, Inadequate energy, Weight loss              360 Statement: Malnutrition related to dementia as evidenced by 14#(9%) weight loss from 5/14/# to 7/1/#, <75% energy intake compared to estimated needs >1 month, temporal wasting, sunken orbitals  BMI Findings: Body mass index is 24 02 kg/m²  · Nutrition recommendations appreciated  · Status post PEG tube placement 07/02/2022  · Initiate patient on Jevity 1 2 at 10 mL/hr continuous via PEG tube; increase by 10 mL every 12 hours to goal rate of 60 mL/hr with 100 mL free water flush every 4 hours

## 2022-07-04 NOTE — ASSESSMENT & PLAN NOTE
· Potassium  · Improving with IV replacement, 3 4 this morning  · Give 1 more dose of IV K this morning  · No events noted on telemetry; will discontinue    · Magnesium 1 9; stable  · Phosphorus level normal  · Suspect refeeding syndrome in the setting of PEG tube placement 07/02/2022  · Nutrition recommendations appreciated

## 2022-07-04 NOTE — PROGRESS NOTES
Advance enteral feeding 10ml/hr every 8 hours to goal rate of Jevity 1 2 at 60ml/hr, flush 100 ml water every 6 hours  Resume IVF over next 24 hours due to inadequacy of enteral regimen for hydration needs  Monitor BMP, Magnesium, Phosphorus daily x 4 days

## 2022-07-04 NOTE — DISCHARGE INSTR - OTHER ORDERS
Skin care plans:  1-Hydraguard to bilateral buttock and heels twice a day  2-Elevate heels to offload pressure  3-Ehob cushion in chair when out of bed  4-Moisturize skin daily with skin nourishing cream   5-Turn/reposition every 2 hours for pressure re-distribution on skin  (Place one wedge above buttock and another wedge below buttock, you do not want the wedges to be making contact with buttock or where the wound is located)  6-Mid sacral wound- Cleanse wound with Normal Saline solution, pat dry  Apply nickel thick layer of santyl to wound bed, then pack wound lightly with small Edson  Cover with 4x4, and then Allevyn foam dressing  Change daily and as needed for soilage/dislodgment  7-Left thigh and right heel- Apply Hydraguard cream to these wounds twice a day and as needed  Elevate the heels so they do not make contact with the surface of mattress or chair  Follow-up with [facility] wound care center as an outpatient- call [phone] for appt

## 2022-07-04 NOTE — WOUND OSTOMY CARE
Progress Note - Wound   Corey Johnson 81 y o  female MRN: [id number]  Unit/Bed#: -01 Encounter: [id number]      Assessment:   Patient admitted due to severe protein malnutrition  History of a-fib , Dementia, HTN  Patient agreeable to assessment, alert and oriented to self, incontinent of bowel and bladder, assist if 1 to turn for assessment, wedges in use for turning and repositioning, heels elevated off of mattress, a P-500 low air loss mattress order has been placed, patient is dependent for care, appears thin and malnourished  Primary RN and SLIM JUDIE Delgado made aware of assessment findings  Nutrition is following patient- patient receiving tube feeds via PEG tube  Recommend patient follow-up out-patient with Wound center  1  Pressure injury sacrum, stage 4-POA- Wound is oval in shape, full-thickness, probes to bone, undermining of approx  5 cm from 10-2 o'clock suggesting component of friction to etiology of wound, visible wound bed is approx  40% yellow slough and 60% beefy red tissue, with moderate amount of serosanguineous drainage with green tint- SLIM team aware- recommend Dakin's rinse  Taylor-wound is dry, intact, blanchable pink skin  2  Bilateral hips, buttock, shoulders, elbows, and heels- skin is dry, intact, blanchable  Educated patient on importance of frequent offloading of pressure via turning, repositioning, and weight-shifting  No induration, fluctuance, odor, warmth, redness, or purulence noted to the above noted wound  New dressing applied  Patient tolerated fairly well, reports moderate to severe pain to the wound  Primary nurse aware of plan of care  See flow sheets for more detailed assessment findings  Will follow along  Skin care plans:  1-Hydraguard to bilateral buttock and heels BID and PRN  2-Elevate heels to offload pressure  3-Ehob cushion in chair when out of bed    4-Moisturize skin daily with skin nourishing cream   5-Turn/reposition q2h for pressure re-distribution on skin  6-Mid sacral wound- Cleanse wound with Dakin's solution  Then rinse with NSS, pat dry  Apply nickel thick layer of santyl to wound bed, then pack wound lightly with plain packing ribbon  Cover with 4x4, and then Allevyn foam dressing  Change daily and as needed for soilage/dislodgment  7-P-500 low air loss mattress  Wound 07/01/22 Pressure Injury Sacrum (Active)   Wound Image    07/04/22 0837   Wound Description Beefy red;Yellow;Slough;Probes to bone 07/04/22 0837   Pressure Injury Stage 4 07/04/22 0837   Taylor-wound Assessment Dry; Intact 07/04/22 0837   Wound Length (cm) 2 cm 07/04/22 0837   Wound Width (cm) 4 cm 07/04/22 0837   Wound Depth (cm) 2 cm 07/04/22 0837   Wound Surface Area (cm^2) 8 cm^2 07/04/22 0837   Wound Volume (cm^3) 16 cm^3 07/04/22 0837   Calculated Wound Volume (cm^3) 16 cm^3 07/04/22 0837   Tunneling 0 cm 07/04/22 0837   Undermining 5 07/04/22 0837   Undermining is depth extending from 10-2 07/04/22 0837   Drainage Amount Moderate 07/04/22 0837   Drainage Description Serosanguineous;Green 07/04/22 0837   Non-staged Wound Description Full thickness 07/04/22 0837   Treatments Cleansed;Irrigation with NSS;Site care 07/04/22 0837   Dressing Packings;Gauze; Foam, Silicon (eg  Allevyn, etc) 07/04/22 0837   Wound packed? Yes 07/04/22 0837   Packing- # removed 0 07/04/22 0837   Packing- # inserted 1 07/04/22 0837   Dressing Changed Changed 07/04/22 0837   Patient Tolerance Tolerated poorly 07/04/22 0837   Dressing Status Clean;Dry; Intact 07/04/22 0837       Call or tigertext with any questions  Wound Care will continue to follow    Bob Santizo  Wound care

## 2022-07-04 NOTE — ASSESSMENT & PLAN NOTE
· Patient with generalized weakness and according to son, not pleased with patient's care at her prior nursing facility  · PT/OT, ordered but not completed yet    · Case management for placement

## 2022-07-05 PROBLEM — L89.94: Status: ACTIVE | Noted: 2022-07-05

## 2022-07-05 LAB
ANION GAP SERPL CALCULATED.3IONS-SCNC: 6 MMOL/L (ref 4–13)
ANION GAP SERPL CALCULATED.3IONS-SCNC: 8 MMOL/L (ref 4–13)
BUN SERPL-MCNC: 11 MG/DL (ref 5–25)
BUN SERPL-MCNC: 11 MG/DL (ref 5–25)
CALCIUM SERPL-MCNC: 8.4 MG/DL (ref 8.3–10.1)
CALCIUM SERPL-MCNC: 8.5 MG/DL (ref 8.3–10.1)
CHLORIDE SERPL-SCNC: 106 MMOL/L (ref 100–108)
CHLORIDE SERPL-SCNC: 107 MMOL/L (ref 100–108)
CO2 SERPL-SCNC: 23 MMOL/L (ref 21–32)
CO2 SERPL-SCNC: 27 MMOL/L (ref 21–32)
CREAT SERPL-MCNC: 0.61 MG/DL (ref 0.6–1.3)
CREAT SERPL-MCNC: 0.63 MG/DL (ref 0.6–1.3)
GFR SERPL CREATININE-BSD FRML MDRD: 82 ML/MIN/1.73SQ M
GFR SERPL CREATININE-BSD FRML MDRD: 82 ML/MIN/1.73SQ M
GLUCOSE SERPL-MCNC: 118 MG/DL (ref 65–140)
GLUCOSE SERPL-MCNC: 122 MG/DL (ref 65–140)
POTASSIUM SERPL-SCNC: 3.2 MMOL/L (ref 3.5–5.3)
POTASSIUM SERPL-SCNC: 3.5 MMOL/L (ref 3.5–5.3)
SODIUM SERPL-SCNC: 137 MMOL/L (ref 136–145)
SODIUM SERPL-SCNC: 140 MMOL/L (ref 136–145)

## 2022-07-05 PROCEDURE — 92610 EVALUATE SWALLOWING FUNCTION: CPT

## 2022-07-05 PROCEDURE — 80048 BASIC METABOLIC PNL TOTAL CA: CPT | Performed by: NURSE PRACTITIONER

## 2022-07-05 PROCEDURE — 99232 SBSQ HOSP IP/OBS MODERATE 35: CPT | Performed by: NURSE PRACTITIONER

## 2022-07-05 PROCEDURE — 97163 PT EVAL HIGH COMPLEX 45 MIN: CPT

## 2022-07-05 PROCEDURE — 97167 OT EVAL HIGH COMPLEX 60 MIN: CPT

## 2022-07-05 RX ORDER — POTASSIUM CHLORIDE 14.9 MG/ML
20 INJECTION INTRAVENOUS
Status: COMPLETED | OUTPATIENT
Start: 2022-07-05 | End: 2022-07-05

## 2022-07-05 RX ADMIN — COLLAGENASE SANTYL: 250 OINTMENT TOPICAL at 08:30

## 2022-07-05 RX ADMIN — ACETAMINOPHEN 650 MG: 325 TABLET, FILM COATED ORAL at 17:22

## 2022-07-05 RX ADMIN — APIXABAN 5 MG: 5 TABLET, FILM COATED ORAL at 17:23

## 2022-07-05 RX ADMIN — ACETAMINOPHEN 650 MG: 325 TABLET, FILM COATED ORAL at 11:47

## 2022-07-05 RX ADMIN — APIXABAN 5 MG: 5 TABLET, FILM COATED ORAL at 08:29

## 2022-07-05 RX ADMIN — DEXTROSE 75 ML/HR: 5 SOLUTION INTRAVENOUS at 03:19

## 2022-07-05 RX ADMIN — DAKIN'S SOLUTION 0.125% (QUARTER STRENGTH): 0.12 SOLUTION at 08:30

## 2022-07-05 RX ADMIN — POTASSIUM CHLORIDE 20 MEQ: 14.9 INJECTION, SOLUTION INTRAVENOUS at 11:47

## 2022-07-05 RX ADMIN — POTASSIUM CHLORIDE 20 MEQ: 14.9 INJECTION, SOLUTION INTRAVENOUS at 14:08

## 2022-07-05 NOTE — PHYSICAL THERAPY NOTE
Physical Therapy Evaluation   Time in: 947  Time out: 1005  Total evaluation time: 18 minutes    Patient's Name: Corey Johnson    Admitting Diagnosis  Severe protein-calorie malnutrition ([facility] ) [E43]  Weight loss [R63 4]    Problem List  Patient Active Problem List   Diagnosis    DVT (deep venous thrombosis) ([facility] )    Essential hypertension    Dementia with behavioral disturbance (HCC)    Generalized weakness    Severe protein-calorie malnutrition (HCC)    Electrolyte disturbance    Stage IV pressure ulcer ([facility] )       Past Medical History  Past Medical History:   Diagnosis Date    A-fib ([facility] )     Dementia ([facility] )     DVT (deep venous thrombosis) ([place] [address] )     Hypertension     Pressure ulcer of sacral region        Past Surgical History  History reviewed  No pertinent surgical history  PT performed at least 2 patient identifiers during session: Name and wristband  07/05/22 0947   PT Last Visit   PT Visit Date 07/05/22   Note Type   Note type Evaluation   Pain Assessment   Pain Assessment Tool FLACC   Pain Location/Orientation Location: Buttocks   Pain Rating: FLACC (Rest) - Face 1   Pain Rating: FLACC (Rest) - Legs 1   Pain Rating: FLACC (Rest) - Activity 1   Pain Rating: FLACC (Rest) - Cry 2   Pain Rating: FLACC (Rest) - Consolability 2   Score: FLACC (Rest) 7   Pain Rating: FLACC (Activity) - Face 2   Pain Rating: FLACC (Activity) - Legs 1   Pain Rating: FLACC (Activity) - Activity 2   Pain Rating: FLACC (Activity) - Cry 2   Pain Rating: FLACC (Activity) - Consolability 2   Score: FLACC (Activity) 9   Restrictions/Precautions   Weight Bearing Precautions Per Order No   Other Precautions Cognitive; Bed Alarm;Agitated;Multiple lines; Fall Risk;Pain   Home Living   Additional Comments Pt presents here from [facility] [place] per EMR  Pt poor historian d/t baseline dementia, CM to follow up re: PLOF/home environment information  Pt reports she lives with her father; poor historian     Prior Function   Lives With [facility] Receives Help From Family   ADL Assistance   (per pt, she is independent with all, CM to follow up)   IADLs   (pt refusing to answer)   Falls in the last 6 months   (pt denies any falls)   General   Family/Caregiver Present No   Cognition   Overall Cognitive Status Impaired   Arousal/Participation Alert   Orientation Level Oriented to person;Disoriented to place; Disoriented to time;Disoriented to situation  (inconsistent to person: "August 1" "I'm 78")   Memory Decreased recall of biographical information;Decreased short term memory;Decreased recall of recent events;Decreased recall of precautions   Following Commands Follows one step commands inconsistently   Comments pt with verbal outbursts t/o evaluation, requiring emotional support and encouragement on importance of bed mobility to assist with hygiene/linen change   RUE Assessment   RUE Assessment   (defer to OT eval for comments)   LUE Assessment   LUE Assessment   (defer to OT eval for comments)   RLE Assessment   RLE Assessment   (grossly 2-/5, pt with poor effort observed during formal MMT, recommend continued assessment)   LLE Assessment   LLE Assessment   (grossly 2-/5, pt with poor effort observed during formal MMT, recommend continued assessment)   Coordination   Movements are Fluid and Coordinated   (unable to formally assess d/t cognition, agitation/ combative at times)   Sensation   (unable to formally assess d/t cognition, agitation/ combative at times)   Bed Mobility   Rolling R 2  Maximal assistance   Additional items Assist x 2;HOB elevated; Increased time required;Verbal cues;LE management; Bedrails   Rolling L 2  Maximal assistance   Additional items Assist x 2;HOB elevated; Increased time required;Verbal cues;LE management; Bedrails   Supine to Sit Unable to assess   Additional Comments verbal cues for use of side rail to assist in sidelying position during jessica care/hygiene   further mobility deferred d/t pt resistance to further mobility and becoming agitated/ attempting to hit therapist(s)   Endurance Deficit   Endurance Deficit Yes   Activity Tolerance   Activity Tolerance Patient limited by fatigue;Treatment limited secondary to agitation; Other (Comment)  (cognition)   Medical Staff Made Aware care coordination with OT [place] SPT Marielena  discussed case with CM Jacquie   Nurse Made Aware DAWIT Agee verbalized pt appropriate to see, made aware of session outcome/recs  DAWIT Agee and  [address] present to assist with hygiene   Assessment   Prognosis Guarded   Problem List Decreased strength;Decreased endurance; Impaired balance;Decreased mobility; Decreased cognition;Decreased safety awareness;Pain;Decreased skin integrity   Assessment Pt is 81 y o  female seen for high-complexity PT evaluation on 7/5/2022 s/p admit to [facility] on 7/1/2022 w/ Severe protein-calorie malnutrition ([facility] )  PT was consulted to assess pt's functional mobility and d/c needs  Order placed for PT eval and tx  Pt presents here from [facility] [place] per EMR  Pt poor historian d/t baseline dementia, CM to follow up re: PLOF/home environment information  Pt reports she lives with her father; poor historian  At time of eval, pt requiring max A x2 for B/L rolling, further mobility deferred d/t pt resistance to further mobility and becoming agitated/ attempting to hit therapist(s)  Upon evaluation, pt presenting with impaired functional mobility d/t decreased strength, decreased endurance, impaired balance, decreased mobility, decreased cognition, decreased safety awareness, decreased skin integrity and activity intolerance  Pertinent PMHx and current co-morbidities affecting pt's physical performance at time of assessment include: severe protein calorie malnutrition, DVT, HTN, dementia with behavioral disturbance, generalized weakness, electrolyte disturbance, stage 4 pressure ulcer   Personal factors affecting pt at time of eval include: inability to navigate level surfaces w/o external assistance, decreased cognition, decreased initiation and engagement, inability to perform IADLs and inability to perform ADLs  The following objective measures performed on IE also reveal limitations: Barthel Index: 0/100, Modified [place]: 5 (severe disability) and AM-PAC 6-Clicks: 3/85  Pt's clinical presentation is currently unstable/unpredictable seen in pt's presentation of advanced age, abnormal lab value(s), need for input for task focus and mobility technique, buttock pain impacting overall mobility status and ongoing medical assessment  Overall, pt's rehab potential and prognosis to return to PLOF is guarded as impacted by objective findings, warranting pt to receive further skilled PT interventions to address identified impairments, activity limitation(s), and participation restriction(s)  Pt to benefit from continued PT tx to address deficits as defined above and maximize level of functional independent mobility and consistency  From PT/mobility standpoint, recommendation at time of d/c would be post acute rehabilitation services pending progress in order to facilitate return to PLOF  Barriers to Discharge Other (Comment)  (unknown PLOF/ home environment; CM to follow up)   Goals   Patient Goals to be left alone   STG Expiration Date 07/15/22   Short Term Goal #1 In 7-10 days: Increase bilateral LE strength 1/2 grade to facilitate independent mobility, Perform all bed mobility tasks with mod A of 1 to decrease caregiver burden, Improve Barthel Index score to 15 or greater to facilitate independence, PT provider will perform functional balance assessment to determine fall risk and PT to see and establish goals for transfers, sitting/standing balance, ambulation, w/c mobility if/ when appropriate   PT Treatment Day 0   Plan   Treatment/Interventions LE strengthening/ROM; Therapeutic exercise; Endurance training;Patient/family training;Equipment eval/education; Bed mobility;Cognitive reorientation;Continued evaluation;Spoke to nursing;OT;Spoke to case management   PT Frequency 3-5x/wk   Recommendation   PT Discharge Recommendation Post acute rehabilitation services   Equipment Recommended   (TBD)   Additional Comments pt will require mechanical conveyance for OOB mobility at this time   AM-PAC Basic Mobility Inpatient   Turning in Bed Without Bedrails 1   Lying on Back to Sitting on Edge of Flat Bed 1   Moving Bed to Chair 1   Standing Up From Chair 1   Walk in Room 1   Climb 3-5 Stairs 1   Basic Mobility Inpatient Raw Score 6   Turning Head Towards Sound 2   Follow Simple Instructions 2   Low Function Basic Mobility Raw Score 10   Low Function Basic Mobility Standardized Score 14 65   Highest Level Of Mobility   -[facility] Goal 2: Bed activities/Dependent transfer   -HL Achieved 2: Bed activities/Dependent transfer   Modified Bam Scale   Modified Gabriels Scale 5   Barthel Index   Feeding 0   Bathing 0   Grooming Score 0   Dressing Score 0   Bladder Score 0   Bowels Score 0   Toilet Use Score 0   Transfers (Bed/Chair) Score 0   Mobility (Level Surface) Score 0   Stairs Score 0   Barthel Index Score 0       Jonathan Motley, PT, DPT

## 2022-07-05 NOTE — PROGRESS NOTES
[address] [facility], [place]  Progress Note - Janine Reynaga 1935, 81 y o  female MRN: [id number]  Unit/Bed#: -01 Encounter: [id number]  Primary Care Provider: Deondre Villa   Date and time admitted to hospital: 7/1/2022  4:10 PM    * Severe protein-calorie malnutrition ([address] )  Assessment & Plan  Malnutrition Findings:   Adult Malnutrition type: Chronic illness  Adult Degree of Malnutrition: Other severe protein calorie malnutrition  Malnutrition Characteristics: Fat loss, Inadequate energy, Weight loss   360 Statement: Malnutrition related to dementia as evidenced by 14#(9%) weight loss from 5/14/# to 7/1/#, <75% energy intake compared to estimated needs >1 month, temporal wasting, sunken orbitals  BMI Findings: Body mass index is 24 02 kg/m²  · Nutrition recommendations appreciated  · Status post PEG tube placement 07/02/2022  · Initiate patient on Jevity 1 2 at 10 mL/hr continuous via PEG tube; increase by 10 mL every 12 hours to goal rate of 60 mL/hr with 100 mL free water flush every 4 hours  · Speech following cleared for  · Puree/thin  · 1:1 feeding assist  · Meds whole in puree     Generalized weakness  Assessment & Plan  · Patient with generalized weakness and according to son, not pleased with patient's care at her prior nursing facility  · PT/OT, ordered but not completed yet  · Case management for placement    Stage IV pressure ulcer (HCC)  Assessment & Plan  Stage 4 Pressure Injury of Sacrum, POA, in setting of advanced dementia evidenced by wound care documentation on 7/4/22 treated with Dakin's rinse/ Santyl, Ehob cushion when out of bed, low air loss mattress and turn/reposition      Electrolyte disturbance  Assessment & Plan  · Potassium  · Improving with IV replacement, 3 5 this morning  · Give 1 more dose of IV K this morning    · Magnesium 1 9; stable  · Phosphorus level normal  · Suspect refeeding syndrome in the setting of PEG tube placement 2022  · Nutrition recommendations appreciated    Dementia with behavioral disturbance ([facility] )  Assessment & Plan  · Currently at baseline  · Delirium precautions    Essential hypertension  Assessment & Plan  · Well controlled, 123-134/80-90s  · Not on home meds  · Continue to monitor    DVT (deep venous thrombosis) ([facility] )  Assessment & Plan  · Eliquis previously on hold for PEG tube placement, now restarted  VTE Pharmacologic Prophylaxis: VTE Score: 6 High Risk (Score >/= 5) - Pharmacological DVT Prophylaxis Ordered: apixaban (Eliquis)  Sequential Compression Devices Ordered  Patient Centered Rounds: I performed bedside rounds with nursing staff today  Discussions with Specialists or Other Care Team Provider:  Reviewed notes    Education and Discussions with Family / Patient:  Discussed with patient and family    Time Spent for Care: 20 minutes  More than 50% of total time spent on counseling and coordination of care as described above  Current Length of Stay: 3 day(s)  Current Patient Status: Inpatient   Certification Statement: The patient will continue to require additional inpatient hospital stay due to PT consult  Discharge Plan: Anticipate discharge in 24-48 hrs to discharge location to be determined pending rehab evaluations  Code Status: Level 1 - Full Code    Subjective:   Patient is resting in bed does not appear to be in any acute distress encouraged her to work with    Objective:     Vitals:   Temp (24hrs), Av 2 °F (36 8 °C), Min:98 2 °F (36 8 °C), Max:98 2 °F (36 8 °C)    Temp:  [98 2 °F (36 8 °C)] 98 2 °F (36 8 °C)  HR:  [100-101] 101  Resp:  [18-21] 21  BP: (133-144)/(81-82) 144/82  SpO2:  [99 %-100 %] 99 %  Body mass index is 24 02 kg/m²  Input and Output Summary (last 24 hours):      Intake/Output Summary (Last 24 hours) at 2022 1050  Last data filed at 2022 0631  Gross per 24 hour   Intake 1010 ml   Output 208 ml   Net 802 ml       Physical Exam:   Physical Exam  Vitals reviewed  HENT:      Head: Normocephalic  Cardiovascular:      Rate and Rhythm: Normal rate  Pulses: Normal pulses  Pulmonary:      Breath sounds: Normal breath sounds  Abdominal:      Palpations: Abdomen is soft  Skin:     General: Skin is warm  Neurological:      Mental Status: She is alert  Mental status is at baseline  Psychiatric:         Mood and Affect: Mood normal      Additional Data:     Labs:  Results from last 7 days   Lab Units 07/04/22  0631 07/03/22  0512 07/02/22  0640   WBC Thousand/uL 6 83   < > 5 90   HEMOGLOBIN g/dL 11 2*   < > 10 6*   HEMATOCRIT % 33 7*   < > 32 1*   PLATELETS Thousands/uL 270   < > 258   NEUTROS PCT %  --   --  65   LYMPHS PCT %  --   --  24   MONOS PCT %  --   --  10   EOS PCT %  --   --  1    < > = values in this interval not displayed  Results from last 7 days   Lab Units 07/05/22  0815 07/04/22  1611 07/04/22  0631   SODIUM mmol/L 137   < > 146*   POTASSIUM mmol/L 3 5   < > 3 4*   CHLORIDE mmol/L 106   < > 112*   CO2 mmol/L 23   < > 24   BUN mg/dL 11   < > 13   CREATININE mg/dL 0 63   < > 0 55*   ANION GAP mmol/L 8   < > 10   CALCIUM mg/dL 8 5   < > 8 5   ALBUMIN g/dL  --   --  1 8*   TOTAL BILIRUBIN mg/dL  --   --  0 95   ALK PHOS U/L  --   --  57   ALT U/L  --   --  9*   AST U/L  --   --  30   GLUCOSE RANDOM mg/dL 122   < > 93    < > = values in this interval not displayed       Results from last 7 days   Lab Units 07/01/22  1718   INR  1 41*                   Lines/Drains:  Invasive Devices  Report    Peripheral Intravenous Line  Duration           Peripheral IV 07/04/22 Left Antecubital 1 day          Drain  Duration           Gastrostomy/Enterostomy Percutaneous Endoscopic Gastrostomy (PEG) 20 Fr  LUQ 2 days                      Imaging:     Recent Cultures (last 7 days):         Last 24 Hours Medication List:   Current Facility-Administered Medications   Medication Dose Route Frequency Provider Last Rate    acetaminophen  650 mg Oral Q6H PRN Visteon HANK Hartman      apixaban  5 mg Oral BID Garfield Herrera PA-C      collagenase   Topical Daily Arvel Patience, CRNP      dextrose  75 mL/hr Intravenous Continuous Arvel Patience, CRNP 75 mL/hr (07/05/22 0319)    potassium chloride  20 mEq Intravenous Q2H Lamine Howard, JUDIE      sodium hypochlorite   Irrigation Daily Arvel Patience, CRNP          Today, Patient Was Seen By: JUDIE Stearns    **Please Note: This note may have been constructed using a voice recognition system  **

## 2022-07-05 NOTE — NURSING NOTE
Pt confused, combative, uncooperative, cursing at staff to leave her alone, pulled iv out, [facility] [place] aware, ok to leave iv out, pt currently have no iv site, will continue to monitor

## 2022-07-05 NOTE — PLAN OF CARE
Problem: PHYSICAL THERAPY ADULT  Goal: Performs mobility at highest level of function for planned discharge setting  See evaluation for individualized goals  Description: Treatment/Interventions: LE strengthening/ROM, Therapeutic exercise, Endurance training, Patient/family training, Equipment eval/education, Bed mobility, Cognitive reorientation, Continued evaluation, Spoke to nursing, OT, Spoke to case management  Equipment Recommended:  (TBD)       See flowsheet documentation for full assessment, interventions and recommendations  7/5/2022 1142 by Srinivasa Pitt PT  Note: Prognosis: Guarded  Problem List: Decreased strength, Decreased endurance, Impaired balance, Decreased mobility, Decreased cognition, Decreased safety awareness, Pain, Decreased skin integrity  Assessment: Pt is 81 y o  female seen for high-complexity PT evaluation on 7/5/2022 s/p admit to [facility] on 7/1/2022 w/ Severe protein-calorie malnutrition ([facility] [address] )  PT was consulted to assess pt's functional mobility and d/c needs  Order placed for PT eval and tx  Pt presents here from [place] [facility] [place] per EMR  Pt poor historian d/t baseline dementia, CM to follow up re: PLOF/home environment information  Pt reports she lives with her father; poor historian  At time of eval, pt requiring max A x2 for B/L rolling, further mobility deferred d/t pt resistance to further mobility and becoming agitated/ attempting to hit therapist(s)  Upon evaluation, pt presenting with impaired functional mobility d/t decreased strength, decreased endurance, impaired balance, decreased mobility, decreased cognition, decreased safety awareness, decreased skin integrity and activity intolerance  Pertinent PMHx and current co-morbidities affecting pt's physical performance at time of assessment include: severe protein calorie malnutrition, DVT, HTN, dementia with behavioral disturbance, generalized weakness, electrolyte disturbance, stage 4 pressure ulcer   Personal factors affecting pt at time of eval include: inability to navigate level surfaces w/o external assistance, decreased cognition, decreased initiation and engagement, inability to perform IADLs and inability to perform ADLs  The following objective measures performed on IE also reveal limitations: Barthel Index: 0/100, Modified Sevier: 5 (severe disability) and AM-PAC 6-Clicks: 1/42  Pt's clinical presentation is currently unstable/unpredictable seen in pt's presentation of advanced age, abnormal lab value(s), need for input for task focus and mobility technique, buttock pain impacting overall mobility status and ongoing medical assessment  Overall, pt's rehab potential and prognosis to return to PLOF is guarded as impacted by objective findings, warranting pt to receive further skilled PT interventions to address identified impairments, activity limitation(s), and participation restriction(s)  Pt to benefit from continued PT tx to address deficits as defined above and maximize level of functional independent mobility and consistency  From PT/mobility standpoint, recommendation at time of d/c would be post acute rehabilitation services pending progress in order to facilitate return to PLOF  Barriers to Discharge: Other (Comment) (unknown PLOF/ home environment; CM to follow up)        PT Discharge Recommendation: Post acute rehabilitation services          See flowsheet documentation for full assessment  7/5/2022 1142 by Shannon Monroy PT  Note: Prognosis: Guarded  Problem List: Decreased strength, Decreased endurance, Impaired balance, Decreased mobility, Decreased cognition, Decreased safety awareness, Pain, Decreased skin integrity  Assessment: Pt is 81 y o  female seen for high-complexity PT evaluation on 7/5/2022 s/p admit to [facility] on 7/1/2022 w/ Severe protein-calorie malnutrition ([address] )  PT was consulted to assess pt's functional mobility and d/c needs  Order placed for PT eval and tx   Pt presents here from [facility] [place] per EMR  Pt poor historian d/t baseline dementia, CM to follow up re: PLOF/home environment information  Pt reports she lives with her father; poor historian  At time of eval, pt requiring max A x2 for B/L rolling, further mobility deferred d/t pt resistance to further mobility and becoming agitated/ attempting to hit therapist(s)  Upon evaluation, pt presenting with impaired functional mobility d/t decreased strength, decreased endurance, impaired balance, decreased mobility, decreased cognition, decreased safety awareness, decreased skin integrity and activity intolerance  Pertinent PMHx and current co-morbidities affecting pt's physical performance at time of assessment include: severe protein calorie malnutrition, DVT, HTN, dementia with behavioral disturbance, generalized weakness, electrolyte disturbance, stage 4 pressure ulcer  Personal factors affecting pt at time of eval include: inability to navigate level surfaces w/o external assistance, decreased cognition, decreased initiation and engagement, inability to perform IADLs and inability to perform ADLs  The following objective measures performed on IE also reveal limitations: Barthel Index: 0/100, Modified Vilas: 5 (severe disability) and AM-PAC 6-Clicks: 0/69  Pt's clinical presentation is currently unstable/unpredictable seen in pt's presentation of advanced age, abnormal lab value(s), need for input for task focus and mobility technique, buttock pain impacting overall mobility status and ongoing medical assessment  Overall, pt's rehab potential and prognosis to return to PLOF is guarded as impacted by objective findings, warranting pt to receive further skilled PT interventions to address identified impairments, activity limitation(s), and participation restriction(s)  Pt to benefit from continued PT tx to address deficits as defined above and maximize level of functional independent mobility and consistency   From PT/mobility standpoint, recommendation at time of d/c would be post acute rehabilitation services pending progress in order to facilitate return to PLOF  Barriers to Discharge: Other (Comment) (unknown PLOF/ home environment; CM to follow up)        PT Discharge Recommendation: Post acute rehabilitation services          See flowsheet documentation for full assessment

## 2022-07-05 NOTE — ASSESSMENT & PLAN NOTE
Stage 4 Pressure Injury of Sacrum, POA, in setting of advanced dementia evidenced by wound care documentation on 7/4/22 treated with Dakin's rinse/ Santyl, Ehob cushion when out of bed, low air loss mattress and turn/reposition

## 2022-07-05 NOTE — SPEECH THERAPY NOTE
Speech-Language Pathology Bedside Swallow Evaluation        Patient Name: Caesar Guerra  [id number] Date: 7/5/2022     Problem List  Principal Problem:    Severe protein-calorie malnutrition ([facility] )  Active Problems:    DVT (deep venous thrombosis) ([facility])    Essential hypertension    Dementia with behavioral disturbance (HCC)    Generalized weakness    Electrolyte disturbance       Past Medical History  Past Medical History:   Diagnosis Date    A-fib ([facility] )     [facility] ([facility] )     DVT (deep venous thrombosis) ([facility] )     Hypertension     Pressure ulcer of sacral region        Past Surgical History  History reviewed  No pertinent surgical history  Summary/Impressions:    Pt presents with minimal oral dysphagia; this impairment is likely confounded by impaired cognitive status  Patient agreeable to minimal trials during this encounter; manually removes all chewable solids from oral cavity  Accepts only puree solids and thin liquids  Functional bolus manipulation w/ timely transfer of consistencies tested  No overt s/s of aspiration or distress  Recommendations:   Diet: puree/level 1 diet and thin liquids   Meds: whole with puree   Feeding assistance: 1:1  Frequent Oral care: 2-4x/day  Aspiration precautions and compensatory swallowing strategies: upright posture, only feed when fully alert, slow rate of feeding, small bites/sips and alternating bites and sips  Other Recommendations/ considerations: SLP to follow-up and trial for advancement; per [facility] notes Aug 2021, pt on regular solids/thin liq  Current Medical Status  Pt is a 81 y o  female who presented to [facility] from [address] [place] [address] with an approx 17# wt  Loss over the past 2 weeks  As per EMS pts son is requesting PEG tube placement by Dr Boris Hill  Pt is now s/p PEG placement on 7/2  Dysphagia evaluation ordered prior to initiationof of oral pleasure feeds       Past medical history:  Please see H&P for details    Special Studies:  None in chart    Social/Education/Vocational Hx:  Pt lives in SNF/ECF    Swallow Information   Current Risks for Dysphagia & Aspiration: poor oral intake; weight loss  Current Symptoms/Concerns: Poor intake   Current Diet: NPO with tube feeds   Baseline Diet: regular diet and thin liquids    Baseline Assessment   Behavior/Cognition: decreased attention  Speech/Language Status: able to follow commands inconsistently and limited verbal output  Patient Positioning: upright in bed    Swallow Mechanism Exam   Facial: symmetrical  Labial: unable to test 2/2 limited command following  Lingual: unable to test 2/2 limited command following  Velum: unable to visualize  Mandible: adequate ROM  Dentition: adequate  Vocal quality:clear/adequate   Volitional Cough: unable to initiate volitional cough   Respiratory: RA    Consistencies Assessed and Performance   Consistencies Administered: thin liquids and puree; soft and regular solids attempted    Oral Stage: Adequate oral opening, labial strip from spoon and engagement for cup drinking  Mastication not assessed 2/2 pt refusal   Transfer is prompt for consistencies tested  No oral residue  Pharyngeal Stage: Laryngeal rise noted upon palpation  Swallow initiation appears timely  No overt s/s of aspiration or distress  Vocal quality remains clear and dry       Esophageal Concerns: none reported      Results Reviewed with: patient, RN and Santa    Will continue to follow for 1-3x  Dysphagia Goals: pt will tolerate thin liquids with puree plus/soft solids without s/s of aspiration x1-3  Discharge recommendation: SNF    Speech Therapy Prognosis   Prognosis: deferred  Prognosis Considerations: cognitive status        Daja Romero [address], [address] [facility]  Speech-Language Pathologist  [place] #[id number]  [place] #[id number]

## 2022-07-05 NOTE — PLAN OF CARE
Problem: OCCUPATIONAL THERAPY ADULT  Goal: Performs self-care activities at highest level of function for planned discharge setting  See evaluation for individualized goals  Description: Treatment Interventions: ADL retraining, Functional transfer training, Activityengagement, Energy conservation, Cardiac education, Continued evaluation, Compensatory technique education, UE strengthening/ROM, Endurance training, Cognitive reorientation, Patient/family training, Equipment evaluation/education          See flowsheet documentation for full assessment, interventions and recommendations  Note: Limitation: Decreased ADL status, Decreased UE strength, Decreased UE ROM, Decreased Safe judgement during ADL, Decreased cognition, Decreased endurance, Decreased self-care trans, Decreased high-level ADLs, Mood limitation  Prognosis: Fair  Assessment: Pt is a 81 y o  female seen for OT evaluation s/p admit to [facility] on 7/1/2022 w/ Severe protein-calorie malnutrition ([facility] [address] )  Comorbidities affecting pt's functional performance at time of assessment include:HTN, stage IV pressure ulcer, generalized weakness, and dementia w/ bx disturbances    Orders placed for OT evaluation and treatment  Performed at least two patient identifiers during session including name and wristband  Personal factors affecting pt at time of IE include:behavioral pattern, difficulty performing ADLS, difficulty performing IADLS , limited insight into deficits, compliance, decreased initiation and engagement , health management  and environment  Pt PLOF is unclear due to pt's dementia based cognitive deficits  CM to follow-up to determine baseline function and home setup if SNF placement was temporary  Upon evaluation: Pt requires max A with UB ADLs, total with LB ADLs, bed mobility with max A of 2, and due to agitation and bxs xfers were deferred at this time   Limitations 2* the following deficits impacting occupational performance: weakness, decreased ROM, decreased strength, decreased dynamic sit/ stand balance, decreased activity tolerance, decreased standing tolerance time for self care and functional mobility, decreased postural control, impaired attention, impaired initiation, impaired memory, impaired sequencing, impaired problem solving, impulsivity, decreased safety awareness, increased pain, impaired interpersonal skills, environmental deficits, decreased coping skills, decreased mobilty and requiring external assistance to complete transitional movements  Pt to benefit from continued skilled OT tx while in the hospital to address deficits as defined above and maximize level of functional independence w ADL's and functional mobility  Occupational Performance areas to address include: eating, grooming, bathing/shower, toilet hygiene, dressing, medication management, socialization, health maintenance, functional mobility, community mobility, clothing management, cleaning, meal prep, money management, household maintenance and social participation  From OT standpoint, recommendation at time of d/c would be post acute rehab vs long term care placement pending baseline/ PLOF determination       OT Discharge Recommendation: Post acute rehabilitation services (vs long term care placement (pending determination of baseline function and home setup))

## 2022-07-05 NOTE — ASSESSMENT & PLAN NOTE
· Potassium  · Improving with IV replacement, 3 5 this morning  · Give 1 more dose of IV K this morning    · Magnesium 1 9; stable  · Phosphorus level normal  · Suspect refeeding syndrome in the setting of PEG tube placement 07/02/2022  · Nutrition recommendations appreciated

## 2022-07-05 NOTE — OCCUPATIONAL THERAPY NOTE
Occupational Therapy Evaluation      Justus Gasariadna    7/5/2022    Principal Problem:    Severe protein-calorie malnutrition ([facility] )  Active Problems:    DVT (deep venous thrombosis) ([facility])    Essential hypertension    Dementia with behavioral disturbance (HCC)    Generalized weakness    Electrolyte disturbance    Stage IV pressure ulcer ([facility] )      Past Medical History:   Diagnosis Date    A-fib ([facility] )     Dementia ([facility] )     DVT (deep venous thrombosis) ([facility] )     Hypertension     Pressure ulcer of sacral region        History reviewed  No pertinent surgical history  07/05/22 0946   OT Last Visit   OT Visit Date 07/05/22   Note Type   Note type Evaluation   Restrictions/Precautions   Weight Bearing Precautions Per Order No   Other Precautions Cognitive; Bed Alarm;Agitated;Multiple lines; Fall Risk;Pain   Pain Assessment   Pain Assessment Tool FLACC   Pain Location/Orientation Location: Buttocks   Pain Rating: FLACC (Rest) - Face 1   Pain Rating: FLACC (Rest) - Legs 1   Pain Rating: FLACC (Rest) - Activity 1   Pain Rating: FLACC (Rest) - Cry 2   Pain Rating: FLACC (Rest) - Consolability 2   Score: FLACC (Rest) 7   Pain Rating: FLACC (Activity) - Face 2   Pain Rating: FLACC (Activity) - Legs 1   Pain Rating: FLACC (Activity) - Activity 2   Pain Rating: FLACC (Activity) - Cry 2   Pain Rating: FLACC (Activity) - Consolability 2   Score: FLACC (Activity) 9   Home Living   Additional Comments [employer] historian  Prior Function   Lives With [facility] Help From Family   ADL Assistance   (per pt, she is independent with all, CM to follow up)   IADLs   (pt refusing to answer)   Falls in the last 6 months   (pt denies any falls)   Lifestyle   Autonomy Per CR pt was recently at the [facility]  Pt is a poor historian and agitates very easily likely due to dementia  It is unclear if the pt was in the [facility] for [address] or long term placement  PLOF and baseline to be determined via CM follow up     Reciprocal Relationships CR reflects pt's son is involved with care  Psychosocial   Psychosocial (WDL) X   Patient Behaviors/Mood Angry;Aggressive verbally, others;Irritable;Aggressive physically, others   ADL   Eating Assistance 3  Moderate Assistance   Eating Deficit Increased time to complete;Supervision/safety;Verbal cueing;Setup;Bringing food to mouth assist   Grooming Assistance 2  Maximal Assistance   Grooming Deficit Standing with assistive device; Increased time to complete;Supervision/safety;Verbal cueing;Steadying;Setup   UB Bathing Assistance 2  Maximal Assistance   UB Bathing Deficit Setup;Steadying;Verbal cueing;Supervision/safety; Increased time to complete   LB Bathing Assistance 1  Total Assistance   LB Bathing Deficit Setup;Steadying;Verbal cueing;Supervision/safety; Increased time to complete   UB Dressing Assistance 2  Maximal Assistance   UB Dressing Deficit Setup;Steadying;Verbal cueing;Supervision/safety; Increased time to complete   LB Dressing Assistance 1  Total Assistance   LB Dressing Deficit Setup;Steadying;Verbal cueing;Supervision/safety; Increased time to complete   Toileting Assistance  1  Total Assistance   Toileting Deficit Increased time to complete;Supervison/safety;Verbal cueing;Steadying;Setup   Functional Assistance 1  Total Assistance   Functional Deficit Setup;Steadying;Verbal cueing;Supervision/safety; Increased time to complete   Bed Mobility   Rolling R 2  Maximal assistance   Additional items Assist x 2;HOB elevated; Increased time required;Verbal cues;LE management   Rolling L 2  Maximal assistance   Additional items Assist x 2;HOB elevated; Increased time required;Verbal cues;LE management; Bedrails   Supine to Sit Unable to assess   Additional Comments Pt required cuing for use of bed rails during bed mobility for jessica care, hygiene, and wound care    Pt became agitated and agressive at times, even attempting to hit therapist    Activity Tolerance   Activity Tolerance Patient limited by fatigue;Treatment limited secondary to agitation; Other (Comment)  (cognition)   Medical Staff Made Aware CC with PT Danay Lorenzo and SPT [address] with pt's RN who stated pt was appropriate for OT and made aware of outcomes   RUE Assessment   RUE Assessment   (functionally assessed due to agitation; ROM demonstrated in shoulder was about 90 degrees, elbows, wrists, and hands appeared [facility])   LUE Assessment   LUE Assessment   (functionally assessed due to agitation; ROM demonstrated in shoulder was about 90 degrees, elbows, wrists, and hands appeared [facility])   Hand Function   Gross Motor Coordination Functional   Fine Motor Coordination Functional   Sensation   Additional Comments Difficult to assess due to agitation and decreased cognition   Proprioception   Proprioception   (Difficult to assess due to agitation and decreased cognition)   Vision - Complex Assessment   Ocular Range of Motion WFL   Cognition   Overall Cognitive Status Impaired   Arousal/Participation Cooperative; Uncooperative   Attention Difficulty attending to directions   Orientation Level Oriented to person;Disoriented to place; Disoriented to time;Disoriented to situation  (inconsistent to person: "August 1" "I'm 69")   Memory Decreased recall of biographical information;Decreased short term memory;Decreased recall of recent events;Decreased recall of precautions;Decreased long term memory  (stated she had no children and lives with parents)   Following Commands Follows one step commands inconsistently   Comments Pt was confused throughout  Verbalizing difficulty with STM and LTM, stated she had no children and lives with parents  Pt was at times cooperative but was also verbally and physically agressive towards staff at times as well  Assessment   Limitation Decreased ADL status; Decreased UE strength;Decreased UE ROM; Decreased Safe judgement during ADL;Decreased cognition;Decreased endurance;Decreased self-care trans;Decreased high-level ADLs;Mood limitation   Prognosis Fair   Assessment Pt is a 81 y o  female seen for OT evaluation s/p admit to [facility] on 7/1/2022 w/ Severe protein-calorie malnutrition ([address] )  Comorbidities affecting pt's functional performance at time of assessment include:HTN, stage IV pressure ulcer, generalized weakness, and dementia w/ bx disturbances    Orders placed for OT evaluation and treatment  Performed at least two patient identifiers during session including name and wristband  Personal factors affecting pt at time of IE include:behavioral pattern, difficulty performing ADLS, difficulty performing IADLS , limited insight into deficits, compliance, decreased initiation and engagement , health management  and environment  Pt PLOF is unclear due to pt's dementia based cognitive deficits  CM to follow-up to determine baseline function and home setup if SNF placement was temporary  Upon evaluation: Pt requires max A with UB ADLs, total with LB ADLs, bed mobility with max A of 2, and due to agitation and bxs xfers were deferred at this time  Limitations 2* the following deficits impacting occupational performance: weakness, decreased ROM, decreased strength, decreased dynamic sit/ stand balance, decreased activity tolerance, decreased standing tolerance time for self care and functional mobility, decreased postural control, impaired attention, impaired initiation, impaired memory, impaired sequencing, impaired problem solving, impulsivity, decreased safety awareness, increased pain, impaired interpersonal skills, environmental deficits, decreased coping skills, decreased mobilty and requiring external assistance to complete transitional movements  Pt to benefit from continued skilled OT tx while in the hospital to address deficits as defined above and maximize level of functional independence w ADL's and functional mobility   Occupational Performance areas to address include: eating, grooming, bathing/shower, toilet hygiene, dressing, medication management, socialization, health maintenance, functional mobility, community mobility, clothing management, cleaning, meal prep, money management, household maintenance and social participation  From OT standpoint, recommendation at time of d/c would be post acute rehab vs long term care placement pending baseline/ PLOF determination  Plan   Treatment Interventions ADL retraining;Functional transfer training; Activityengagement; Energy conservation;Cardiac education;Continued evaluation; Compensatory technique education;UE strengthening/ROM; Endurance training;Cognitive reorientation;Patient/family training;Equipment evaluation/education   Goal Expiration Date 07/18/22   OT Frequency 3-5x/wk   Recommendation   OT Discharge Recommendation Post acute rehabilitation services  (vs long term care placement (pending determination of baseline function and home setup))   AM-PAC Daily Activity Inpatient   Lower Body Dressing 1   Bathing 1   Toileting 1   Upper Body Dressing 2   Grooming 2   Eating 2   Daily Activity Raw Score 9   Turning Head Towards Sound 3   Follow Simple Instructions 2   Low Function Daily Activity Raw Score 14   Low Function Daily Activity Standardized Score 24 79   AM-PAC Applied Cognition Inpatient   Following a Speech/Presentation 1   Understanding Ordinary Conversation 2   Taking Medications 1   Remembering Where Things Are Placed or Put Away 1   Remembering List of 4-5 Errands 1   Taking Care of Complicated Tasks 1   Applied Cognition Raw Score 7   Applied Cognition Standardized Score 15 17   Barthel Index   Feeding 0   Bathing 0   Grooming Score 0   Dressing Score 0   Bladder Score 0   Bowels Score 0   Toilet Use Score 0   Transfers (Bed/Chair) Score 0   Mobility (Level Surface) Score 0   Stairs Score 0   Barthel Index Score 0   Modified Anson Scale   Modified Anson Scale 5     Occupational Therapy goals:  In 7-14 days:      Patient will increase OOB/ sitting tolerance to 2-4 hours per day for increased participation in self care and leisure tasks with no s/s of exertion  Patient will increase sitting tolerance at edge of bed to 20 minutes to complete UB ADLs @ min assist level  Patient will verbalize and demonstrate weight shifting technique in bed and sitting position with 10% verbal cues    Patient will complete self feeding task with set up assist    Patient will complete rolling to R/L with use of side rail and min assist x1  Patient will complete grooming task with set up assist      Patient will increase standing tolerance time to 5 minutes with unilateral UE support to complete sink level ADLs@ min assist level    Patient will follow 100% simple one step directives without verbal cues  Patient will transfer bed to Chair / toilet with mod assist with AD as indicated  Patient will complete UB ADLs with min assist    Patient will complete LB ADLs with mod assist with the use of adaptive equipment  Patient will complete toileting hygiene with mod assist assist/ supervision for thoroughness  Patient/ Family will demonstrate competency with UE Home Exercise Program     Patient will complete Interest checklist to explore participation in play/ leisure tasks for increased satisfaction and quality of life         Evelyn Galindo MS OTR/L

## 2022-07-05 NOTE — ASSESSMENT & PLAN NOTE
Malnutrition Findings:   Adult Malnutrition type: Chronic illness  Adult Degree of Malnutrition: Other severe protein calorie malnutrition  Malnutrition Characteristics: Fat loss, Inadequate energy, Weight loss   360 Statement: Malnutrition related to dementia as evidenced by 14#(9%) weight loss from 5/14/# to 7/1/#, <75% energy intake compared to estimated needs >1 month, temporal wasting, sunken orbitals  BMI Findings: Body mass index is 24 02 kg/m²  · Nutrition recommendations appreciated  · Status post PEG tube placement 07/02/2022  · Initiate patient on Jevity 1 2 at 10 mL/hr continuous via PEG tube; increase by 10 mL every 12 hours to goal rate of 60 mL/hr with 100 mL free water flush every 4 hours    · Speech following cleared for  · Puree/thin  · 1:1 feeding assist  · Meds whole in puree

## 2022-07-06 LAB
ANION GAP SERPL CALCULATED.3IONS-SCNC: 5 MMOL/L (ref 4–13)
BUN SERPL-MCNC: 11 MG/DL (ref 5–25)
CALCIUM SERPL-MCNC: 8.3 MG/DL (ref 8.3–10.1)
CHLORIDE SERPL-SCNC: 105 MMOL/L (ref 100–108)
CO2 SERPL-SCNC: 28 MMOL/L (ref 21–32)
CREAT SERPL-MCNC: 0.59 MG/DL (ref 0.6–1.3)
ERYTHROCYTE [DISTWIDTH] IN BLOOD BY AUTOMATED COUNT: 16.8 % (ref 11.6–15.1)
GFR SERPL CREATININE-BSD FRML MDRD: 83 ML/MIN/1.73SQ M
GLUCOSE SERPL-MCNC: 109 MG/DL (ref 65–140)
HCT VFR BLD AUTO: 33 % (ref 34.8–46.1)
HGB BLD-MCNC: 10.9 G/DL (ref 11.5–15.4)
MCH RBC QN AUTO: 31.6 PG (ref 26.8–34.3)
MCHC RBC AUTO-ENTMCNC: 33 G/DL (ref 31.4–37.4)
MCV RBC AUTO: 96 FL (ref 82–98)
PLATELET # BLD AUTO: 263 THOUSANDS/UL (ref 149–390)
PMV BLD AUTO: 9.7 FL (ref 8.9–12.7)
POTASSIUM SERPL-SCNC: 4.1 MMOL/L (ref 3.5–5.3)
RBC # BLD AUTO: 3.45 MILLION/UL (ref 3.81–5.12)
SODIUM SERPL-SCNC: 138 MMOL/L (ref 136–145)
WBC # BLD AUTO: 9.88 THOUSAND/UL (ref 4.31–10.16)

## 2022-07-06 PROCEDURE — 80048 BASIC METABOLIC PNL TOTAL CA: CPT | Performed by: NURSE PRACTITIONER

## 2022-07-06 PROCEDURE — 85027 COMPLETE CBC AUTOMATED: CPT | Performed by: NURSE PRACTITIONER

## 2022-07-06 RX ADMIN — APIXABAN 5 MG: 5 TABLET, FILM COATED ORAL at 11:09

## 2022-07-06 RX ADMIN — DAKIN'S SOLUTION 0.125% (QUARTER STRENGTH): 0.12 SOLUTION at 11:01

## 2022-07-06 RX ADMIN — APIXABAN 5 MG: 5 TABLET, FILM COATED ORAL at 17:52

## 2022-07-06 RX ADMIN — COLLAGENASE SANTYL: 250 OINTMENT TOPICAL at 11:03

## 2022-07-06 NOTE — PROGRESS NOTES
[address]  Progress Note - Helene [place] 1935, 81 y o  female MRN: [id number]  Unit/Bed#: -01 Encounter: [id number]  Primary Care Provider: Aureliano Teixeira   Date and time admitted to hospital: 7/1/2022  4:10 PM    * Severe protein-calorie malnutrition ([address] )  Assessment & Plan  Malnutrition Findings:   Adult Malnutrition type: Chronic illness  Adult Degree of Malnutrition: Other severe protein calorie malnutrition  Malnutrition Characteristics: Fat loss, Inadequate energy, Weight loss   360 Statement: Malnutrition related to dementia as evidenced by 14#(9%) weight loss from 5/14/# to 7/1/#, <75% energy intake compared to estimated needs >1 month, temporal wasting, sunken orbitals  BMI Findings: Body mass index is 24 02 kg/m²  · Nutrition recommendations appreciated  · Status post PEG tube placement 07/02/2022  · Currently at goal with Jevity 1 2 at 60 mL/hr via PEG tube with 100 mL free water flush every 4 hours  · Speech following cleared for  · Puree/thin  · 1:1 feeding assist  · Meds whole in puree     Stage IV pressure ulcer (HCC)  Assessment & Plan  · Stage 4 Pressure Injury of Sacrum, POA, in setting of advanced dementia evidenced by wound care documentation on 7/4/22 treated with Dakin's rinse/ Santyl, Ehob cushion when out of bed, low air loss mattress and turn/reposition      Electrolyte disturbance  Assessment & Plan  · Potassium  · Now resolved    · Magnesium 1 9; stable  · Phosphorus level normal  · Nutrition recommendations appreciated    Generalized weakness  Assessment & Plan  · Patient with generalized weakness and according to son, not pleased with patient's care at her prior nursing facility  · PT/OT, STR  · Case management for placement    Dementia with behavioral disturbance [facility])  Assessment & Plan  · Currently at baseline  · Delirium precautions    Essential hypertension  Assessment & Plan  · Well controlled, 123-126/70s  · Not on home meds  · Continue to monitor    DVT (deep venous thrombosis) ([facility] )  Assessment & Plan  · Eliquis previously on hold for PEG tube placement, now restarted  VTE Pharmacologic Prophylaxis: VTE Score: 6 High Risk (Score >/= 5) - Pharmacological DVT Prophylaxis Ordered: apixaban (Eliquis)  Sequential Compression Devices Ordered  Patient Centered Rounds: I performed bedside rounds with nursing staff today  Discussions with Specialists or Other Care Team Provider:     Education and Discussions with Family / Patient: Patient declined call to   Time Spent for Care: 20 minutes  More than 50% of total time spent on counseling and coordination of care as described above  Current Length of Stay: 4 day(s)  Current Patient Status: Inpatient   Certification Statement: The patient will continue to require additional inpatient hospital stay due to Ongoing treatment in setting of pending placement  Discharge Plan: TBD pending placement    Code Status: Level 1 - Full Code    Subjective:   Patient resting in bed at this time denies any complaints of chest pain, shortness of breath, fever, chills  Objective:     Vitals:   Temp (24hrs), Av 6 °F (37 °C), Min:98 6 °F (37 °C), Max:98 6 °F (37 °C)    Temp:  [98 6 °F (37 °C)] 98 6 °F (37 °C)  HR:  [] 70  Resp:  [20] 20  BP: (123-126)/(71-75) 123/75  SpO2:  [98 %] 98 %  Body mass index is 24 02 kg/m²  Input and Output Summary (last 24 hours): Intake/Output Summary (Last 24 hours) at 2022 1247  Last data filed at 2022 0640  Gross per 24 hour   Intake 934 ml   Output 0 ml   Net 934 ml       Physical Exam:   Physical Exam  Vitals and nursing note reviewed  Constitutional:       General: She is not in acute distress  Appearance: She is normal weight  She is not ill-appearing  Cardiovascular:      Rate and Rhythm: Normal rate  Pulses: Normal pulses  Heart sounds: Normal heart sounds     Pulmonary:      Effort: Pulmonary effort is normal       Breath sounds: Normal breath sounds  Abdominal:      General: Bowel sounds are normal       Palpations: Abdomen is soft  Musculoskeletal:         General: Normal range of motion  Skin:     General: Skin is warm and dry  Neurological:      Mental Status: She is alert and oriented to person, place, and time  Psychiatric:         Mood and Affect: Mood normal           Additional Data:     Labs:  Results from last 7 days   Lab Units 07/06/22  0440 07/03/22  0512 07/02/22  0640   WBC Thousand/uL 9 88   < > 5 90   HEMOGLOBIN g/dL 10 9*   < > 10 6*   HEMATOCRIT % 33 0*   < > 32 1*   PLATELETS Thousands/uL 263   < > 258   NEUTROS PCT %  --   --  65   LYMPHS PCT %  --   --  24   MONOS PCT %  --   --  10   EOS PCT %  --   --  1    < > = values in this interval not displayed  Results from last 7 days   Lab Units 07/06/22  0440 07/04/22  1611 07/04/22  0631   SODIUM mmol/L 138   < > 146*   POTASSIUM mmol/L 4 1   < > 3 4*   CHLORIDE mmol/L 105   < > 112*   CO2 mmol/L 28   < > 24   BUN mg/dL 11   < > 13   CREATININE mg/dL 0 59*   < > 0 55*   ANION GAP mmol/L 5   < > 10   CALCIUM mg/dL 8 3   < > 8 5   ALBUMIN g/dL  --   --  1 8*   TOTAL BILIRUBIN mg/dL  --   --  0 95   ALK PHOS U/L  --   --  57   ALT U/L  --   --  9*   AST U/L  --   --  30   GLUCOSE RANDOM mg/dL 109   < > 93    < > = values in this interval not displayed  Results from last 7 days   Lab Units 07/01/22  1718   INR  1 41*                   Lines/Drains:  Invasive Devices  Report    Drain  Duration           Gastrostomy/Enterostomy Percutaneous Endoscopic Gastrostomy (PEG) 20 Fr  LUQ 3 days                Imaging: No pertinent imaging reviewed      Recent Cultures (last 7 days):         Last 24 Hours Medication List:   Current Facility-Administered Medications   Medication Dose Route Frequency Provider Last Rate    acetaminophen  650 mg Oral Q6H PRN Rita Arroyo PA-C      apixaban  5 mg Oral BID Bri Conley PA-C      collagenase   Topical Daily JUDIE Felix      sodium hypochlorite   Irrigation Daily JUDIE Felix          Today, Patient Was Seen By: JUDIE Felix    **Please Note: This note may have been constructed using a voice recognition system  **

## 2022-07-06 NOTE — ASSESSMENT & PLAN NOTE
Malnutrition Findings:   Adult Malnutrition type: Chronic illness  Adult Degree of Malnutrition: Other severe protein calorie malnutrition  Malnutrition Characteristics: Fat loss, Inadequate energy, Weight loss   360 Statement: Malnutrition related to dementia as evidenced by 14#(9%) weight loss from 5/14/# to 7/1/#, <75% energy intake compared to estimated needs >1 month, temporal wasting, sunken orbitals  BMI Findings: Body mass index is 24 02 kg/m²       · Nutrition recommendations appreciated  · Status post PEG tube placement 07/02/2022  · Currently at goal with Jevity 1 2 at 60 mL/hr via PEG tube with 100 mL free water flush every 4 hours  · Speech following cleared for  · Puree/thin  · 1:1 feeding assist  · Meds whole in puree

## 2022-07-06 NOTE — ASSESSMENT & PLAN NOTE
· Patient with generalized weakness and according to son, not pleased with patient's care at her prior nursing facility  · PT/OT, STR  · Case management for placement

## 2022-07-06 NOTE — NURSING NOTE
In report from day shift RN given on 7/4, this writer was verbally notified that overnight RN from previous night noted IV infiltration in RAC IV site which had potassium running through it on the morning of 7/4  This writer did not notice anything abnormal with R arm on assessment until this am 7/6 I notice it is red, warm to touch, and painful to patient as evident by screaming when arm is touched  Notified on call NERY who advised I pass on to day team for further evaluation, elevate extremity, and apply heat

## 2022-07-06 NOTE — ASSESSMENT & PLAN NOTE
· Stage 4 Pressure Injury of Sacrum, POA, in setting of advanced dementia evidenced by wound care documentation on 7/4/22 treated with Dakin's rinse/ Santyl, Ehob cushion when out of bed, low air loss mattress and turn/reposition

## 2022-07-06 NOTE — ASSESSMENT & PLAN NOTE
· Potassium  · Now resolved    · Magnesium 1 9; stable  · Phosphorus level normal  · Nutrition recommendations appreciated

## 2022-07-06 NOTE — QUICK NOTE
Notified by RN of RUE redness and swelling  Possibility of IV potassium infiltration on 7/4? Elevate RUE and apply warm compress  Low suspicion for DVT as patient on eliquis

## 2022-07-07 ENCOUNTER — APPOINTMENT (INPATIENT)
Dept: CT IMAGING | Facility: HOSPITAL | Age: 87
DRG: 640 | End: 2022-07-07
Payer: MEDICARE

## 2022-07-07 ENCOUNTER — APPOINTMENT (INPATIENT)
Dept: RADIOLOGY | Facility: HOSPITAL | Age: 87
DRG: 640 | End: 2022-07-07
Payer: MEDICARE

## 2022-07-07 LAB
ERYTHROCYTE [DISTWIDTH] IN BLOOD BY AUTOMATED COUNT: 16.8 % (ref 11.6–15.1)
HCT VFR BLD AUTO: 34.3 % (ref 34.8–46.1)
HGB BLD-MCNC: 11.6 G/DL (ref 11.5–15.4)
MCH RBC QN AUTO: 32.7 PG (ref 26.8–34.3)
MCHC RBC AUTO-ENTMCNC: 33.8 G/DL (ref 31.4–37.4)
MCV RBC AUTO: 97 FL (ref 82–98)
PLATELET # BLD AUTO: 291 THOUSANDS/UL (ref 149–390)
PMV BLD AUTO: 9.7 FL (ref 8.9–12.7)
PROCALCITONIN SERPL-MCNC: 8.47 NG/ML
RBC # BLD AUTO: 3.55 MILLION/UL (ref 3.81–5.12)
WBC # BLD AUTO: 14.33 THOUSAND/UL (ref 4.31–10.16)

## 2022-07-07 PROCEDURE — 71250 CT THORAX DX C-: CPT

## 2022-07-07 PROCEDURE — 99232 SBSQ HOSP IP/OBS MODERATE 35: CPT | Performed by: NURSE PRACTITIONER

## 2022-07-07 PROCEDURE — 85027 COMPLETE CBC AUTOMATED: CPT | Performed by: NURSE PRACTITIONER

## 2022-07-07 PROCEDURE — 84145 PROCALCITONIN (PCT): CPT | Performed by: NURSE PRACTITIONER

## 2022-07-07 PROCEDURE — 71045 X-RAY EXAM CHEST 1 VIEW: CPT

## 2022-07-07 PROCEDURE — 74176 CT ABD & PELVIS W/O CONTRAST: CPT

## 2022-07-07 RX ORDER — METOCLOPRAMIDE HYDROCHLORIDE 5 MG/ML
10 INJECTION INTRAMUSCULAR; INTRAVENOUS EVERY 6 HOURS PRN
Status: DISCONTINUED | OUTPATIENT
Start: 2022-07-07 | End: 2022-07-08

## 2022-07-07 RX ADMIN — APIXABAN 5 MG: 5 TABLET, FILM COATED ORAL at 17:08

## 2022-07-07 RX ADMIN — COLLAGENASE SANTYL: 250 OINTMENT TOPICAL at 11:38

## 2022-07-07 RX ADMIN — DAKIN'S SOLUTION 0.125% (QUARTER STRENGTH): 0.12 SOLUTION at 11:38

## 2022-07-07 NOTE — PROGRESS NOTES
[address] [facility]  Progress Note - Justus Ya 1935, 81 y o  female MRN: [id number]  Unit/Bed#: -01 Encounter: [id number]  Primary Care Provider: Raiza Oliva   Date and time admitted to hospital: 7/1/2022  4:10 PM    * Severe protein-calorie malnutrition ([address] )  Assessment & Plan  Malnutrition Findings:   Adult Malnutrition type: Chronic illness  Adult Degree of Malnutrition: Other severe protein calorie malnutrition  Malnutrition Characteristics: Fat loss, Inadequate energy, Weight loss   360 Statement: Malnutrition related to dementia as evidenced by 14#(9%) weight loss from 5/14/# to 7/1/#, <75% energy intake compared to estimated needs >1 month, temporal wasting, sunken orbitals  BMI Findings: Body mass index is 24 02 kg/m²  · Nutrition recommendations appreciated  · Status post PEG tube placement 07/02/2022  · Was advanced to goal rate of 60 mL/hr on 7/6, tolerated well however, episode of vomiting this morning  Tube feed held at this time  · Will check chest x-ray to rule out aspiration; noted to be tachycardic and with elevated temp of 100 1 this morning  · Tube feed recommended by Nutrition  · Jevity 1 2 at 60 mL/hr via PEG tube with 100 mL free water flush every 4 hours  · Speech following cleared for  · Puree/thin  · 1:1 feeding assist  · Meds whole in puree     Stage IV pressure ulcer (HCC)  Assessment & Plan  · Stage 4 Pressure Injury of Sacrum, POA, in setting of advanced dementia evidenced by wound care documentation on 7/4/22 treated with Dakin's rinse/ Santyl, Ehob cushion when out of bed, low air loss mattress and turn/reposition      Electrolyte disturbance  Assessment & Plan  · Potassium  · Now resolved    · Magnesium 1 9; stable  · Phosphorus level normal  · Nutrition recommendations appreciated    Generalized weakness  Assessment & Plan  · Patient with generalized weakness and according to son, not pleased with patient's care at her prior nursing facility  · PT/OT, STR  · Case management for placement    Dementia with behavioral disturbance [facility])  Assessment & Plan  · Currently at baseline  · Delirium precautions    Essential hypertension  Assessment & Plan  · Well controlled, 123-126/70s  · Not on home meds  · Continue to monitor    DVT (deep venous thrombosis) ([facility] [address] )  Assessment & Plan  · Eliquis previously on hold for PEG tube placement, now restarted  VTE Pharmacologic Prophylaxis: VTE Score: 6 High Risk (Score >/= 5) - Pharmacological DVT Prophylaxis Ordered: apixaban (Eliquis)  Sequential Compression Devices Ordered  Patient Centered Rounds: I performed bedside rounds with nursing staff today  Discussions with Specialists or Other Care Team Provider: Case Management    Education and Discussions with Family / Patient: Patient declined call to   Time Spent for Care: 20 minutes  More than 50% of total time spent on counseling and coordination of care as described above  Current Length of Stay: 5 day(s)  Current Patient Status: Inpatient   Certification Statement: The patient will continue to require additional inpatient hospital stay due to pending placement  Discharge Plan: tbd pending placement    Code Status: Level 1 - Full Code    Subjective:   Patient sleeping, aroused to my voice  Denies complaints of pain  No overnight complaints except an episode of vomiting this morning  Tube feed shut off and held  Restarted at 10 mL/hr  Objective:     Vitals:   Temp (24hrs), Av 1 °F (37 8 °C), Min:99 8 °F (37 7 °C), Max:100 3 °F (37 9 °C)    Temp:  [99 8 °F (37 7 °C)-100 3 °F (37 9 °C)] 100 3 °F (37 9 °C)  HR:  [] 124  Resp:  [16-17] 16  BP: (150-153)/(82-90) 150/82  SpO2:  [96 %-99 %] 99 %  Body mass index is 24 02 kg/m²  Input and Output Summary (last 24 hours):      Intake/Output Summary (Last 24 hours) at 2022 0835  Last data filed at 2022 1422  Gross per 24 hour   Intake 100 ml   Output -- Net 100 ml       Physical Exam:   Physical Exam  Vitals and nursing note reviewed  Constitutional:       General: She is not in acute distress  Appearance: She is underweight  She is ill-appearing  Cardiovascular:      Rate and Rhythm: Normal rate  Pulses: Normal pulses  Heart sounds: Normal heart sounds  Pulmonary:      Effort: Pulmonary effort is normal       Breath sounds: Normal breath sounds  Abdominal:      General: Bowel sounds are normal       Palpations: Abdomen is soft  Musculoskeletal:         General: Normal range of motion  Right lower leg: Edema present  Left lower leg: Edema present  Skin:     General: Skin is warm and dry  Coloration: Skin is pale  Neurological:      Mental Status: She is alert  Mental status is at baseline  Psychiatric:         Mood and Affect: Mood normal           Additional Data:     Labs:  Results from last 7 days   Lab Units 07/06/22  0440 07/03/22  0512 07/02/22  0640   WBC Thousand/uL 9 88   < > 5 90   HEMOGLOBIN g/dL 10 9*   < > 10 6*   HEMATOCRIT % 33 0*   < > 32 1*   PLATELETS Thousands/uL 263   < > 258   NEUTROS PCT %  --   --  65   LYMPHS PCT %  --   --  24   MONOS PCT %  --   --  10   EOS PCT %  --   --  1    < > = values in this interval not displayed  Results from last 7 days   Lab Units 07/06/22  0440 07/04/22  1611 07/04/22  0631   SODIUM mmol/L 138   < > 146*   POTASSIUM mmol/L 4 1   < > 3 4*   CHLORIDE mmol/L 105   < > 112*   CO2 mmol/L 28   < > 24   BUN mg/dL 11   < > 13   CREATININE mg/dL 0 59*   < > 0 55*   ANION GAP mmol/L 5   < > 10   CALCIUM mg/dL 8 3   < > 8 5   ALBUMIN g/dL  --   --  1 8*   TOTAL BILIRUBIN mg/dL  --   --  0 95   ALK PHOS U/L  --   --  57   ALT U/L  --   --  9*   AST U/L  --   --  30   GLUCOSE RANDOM mg/dL 109   < > 93    < > = values in this interval not displayed       Results from last 7 days   Lab Units 07/01/22  1718   INR  1 41*                   Lines/Drains:  Invasive Devices  Report Drain  Duration           Gastrostomy/Enterostomy Percutaneous Endoscopic Gastrostomy (PEG) 20 Fr  LUQ 4 days                      Imaging: Reviewed radiology reports from this admission including: chest xray    Recent Cultures (last 7 days):         Last 24 Hours Medication List:   Current Facility-Administered Medications   Medication Dose Route Frequency Provider Last Rate    acetaminophen  650 mg Oral Q6H PRN Mateus Keenan PA-C      apixaban  5 mg Oral BID Tere Gamboa PA-C      collagenase   Topical Daily JUDIE Galindo      sodium hypochlorite   Irrigation Daily JUDIE Galindo          Today, Patient Was Seen By: JUDIE Galindo    **Please Note: This note may have been constructed using a voice recognition system  **

## 2022-07-07 NOTE — NURSING NOTE
Nurse rechecked feeding tube residual, 20 ml noted  Per Emelia Guerrero, tube feeding is to restart at a rate of 10ml/hour

## 2022-07-07 NOTE — ASSESSMENT & PLAN NOTE
Malnutrition Findings:   Adult Malnutrition type: Chronic illness  Adult Degree of Malnutrition: Other severe protein calorie malnutrition  Malnutrition Characteristics: Fat loss, Inadequate energy, Weight loss   360 Statement: Malnutrition related to dementia as evidenced by 14#(9%) weight loss from 5/14/# to 7/1/#, <75% energy intake compared to estimated needs >1 month, temporal wasting, sunken orbitals  BMI Findings: Body mass index is 24 02 kg/m²  · Nutrition recommendations appreciated  · Status post PEG tube placement 07/02/2022  · Was advanced to goal rate of 60 mL/hr on 7/6, tolerated well however, episode of vomiting this morning  Tube feed held at this time  · Will check chest x-ray to rule out aspiration; noted to be tachycardic and with elevated temp of 100 1 this morning    · Tube feed recommended by Nutrition  · Jevity 1 2 at 60 mL/hr via PEG tube with 100 mL free water flush every 4 hours  · Speech following cleared for  · Puree/thin  · 1:1 feeding assist  · Meds whole in puree

## 2022-07-07 NOTE — NURSING NOTE
Nurse was instructed to restart tube feeding at 10 ml/hour  Nurse checked residual and noted over 300 ml  Per Tanika Pineda, keep tube feed on hold as well as hold eliquis for now due to high residual  Unable to give medication through tube at this time

## 2022-07-07 NOTE — NURSING NOTE
Patient does not want SCDs on  Patient's legs are sensitive and she screams when they are on  Patient screams and reports pain when her legs are touched  Britni Nava informed, nurse requested SCD order to be discontinued

## 2022-07-07 NOTE — CASE MANAGEMENT
Case Management Discharge Planning Note    Patient name Samuel Sabillon  Location /-08 MRN [id number]  : 1935 Date 2022       Current Admission Date: 2022  Current Admission Diagnosis:Severe protein-calorie malnutrition [facility])   Patient Active Problem List    Diagnosis Date Noted    Stage IV pressure ulcer ([facility] ) 2022    Severe protein-calorie malnutrition [facility] ) 2022    Electrolyte disturbance 2022    Generalized weakness 2022    [facility] ) 2022    Essential hypertension 2021    Dementia with behavioral disturbance ([place] [address] ) 2021      LOS (days): 5  Geometric Mean LOS (GMLOS) (days): 3 30  Days to GMLOS:-1 8     OBJECTIVE:  Risk of Unplanned Readmission Score: 8 66         Current admission status: Inpatient   Preferred Pharmacy:   [address] [place], [place] [id number]  Phone: [phone] Fax: [phone]    Primary Care Provider: Tab Randolph    Primary Insurance: MEDICARE  Secondary Insurance: [place] [address] [place] [address] DETAILS:    Other Referral/Resources/Interventions Provided:  Interventions: Short Term Rehab  Referral Comments: Anthony sent a message to 5 interested facilities via [employer] requesting a determination  CM also sent a TT to Jonny Root from [facility] and Alberta from [facility] asking if either facility can accept  Responses pending at this time

## 2022-07-07 NOTE — NURSING NOTE
Patient found this AM around 0645 with dark brown colored vomit on her right side  HOB 45 degrees  Tube feeding was running at 60ml/hr which was being tolerated well until this time  Tube feeding put on hold for now  Due to change of shift hour, day shift RN will notify day team SLIM about this issue  Day shift RN aware of situation and will continue to monitor

## 2022-07-07 NOTE — CASE MANAGEMENT
Case Management Discharge Planning Note    Patient name Quinton Wiseman  Location /-08 MRN [id number]  : 1935 Date 2022       Current Admission Date: 2022  Current Admission Diagnosis:Severe protein-calorie malnutrition [facility])   Patient Active Problem List    Diagnosis Date Noted    Stage IV pressure ulcer ([facility] ) 2022    Severe protein-calorie malnutrition ([facility] ) 2022    Electrolyte disturbance 2022    Generalized weakness 2022    DVT (deep venous thrombosis) ([facility] ) 2022    Essential hypertension 2021    Dementia with behavioral disturbance ([place] [address] ) 2021      LOS (days): 5  Geometric Mean LOS (GMLOS) (days): 3 30  Days to GMLOS:-1 9     OBJECTIVE:  Risk of Unplanned Readmission Score: 9 79         Current admission status: Inpatient   Preferred Pharmacy:   [address] [place] [address] [address] [place] [id number]  Phone: [phone] Fax: [phone]    Primary Care Provider: Mojgan Gamez    Primary Insurance: MEDICARE  Secondary Insurance: [place] [address] [place] [address] DETAILS:    Other Referral/Resources/Interventions Provided:  Interventions: Short Term Rehab  Referral Comments: Per george, interested facilities do not have a bed available today for non-vaccinated females  Both [facility] and [facility] will follow up tomorrow  Olga Singh from [facility] reported that they do not have an appropriate bed, but they will refer to their sister facility   to follow up tomorrow morning

## 2022-07-07 NOTE — PLAN OF CARE
Problem: Prexisting or High Potential for Compromised Skin Integrity  Goal: Skin integrity is maintained or improved  Description: INTERVENTIONS:  - Identify patients at risk for skin breakdown  - Assess and monitor skin integrity  - Assess and monitor nutrition and hydration status  - Monitor labs   - Assess for incontinence   - Turn and reposition patient  - Assist with mobility/ambulation  - Relieve pressure over bony prominences  - Avoid friction and shearing  - Provide appropriate hygiene as needed including keeping skin clean and dry  - Evaluate need for skin moisturizer/barrier cream  - Collaborate with interdisciplinary team   - Patient/family teaching  - Consider wound care consult   Outcome: Progressing     Problem: GASTROINTESTINAL - ADULT  Goal: Minimal or absence of nausea and/or vomiting  Description: INTERVENTIONS:  - Administer IV fluids if ordered to ensure adequate hydration  - Maintain NPO status until nausea and vomiting are resolved  - Nasogastric tube if ordered  - Administer ordered antiemetic medications as needed  - Provide nonpharmacologic comfort measures as appropriate  - Advance diet as tolerated, if ordered  - Consider nutrition services referral to assist patient with adequate nutrition and appropriate food choices  Outcome: Progressing  Goal: Maintains or returns to baseline bowel function  Description: INTERVENTIONS:  - Assess bowel function  - Encourage oral fluids to ensure adequate hydration  - Administer IV fluids if ordered to ensure adequate hydration  - Administer ordered medications as needed  - Encourage mobilization and activity  - Consider nutritional services referral to assist patient with adequate nutrition and appropriate food choices  Outcome: Progressing

## 2022-07-08 ENCOUNTER — APPOINTMENT (INPATIENT)
Dept: ULTRASOUND IMAGING | Facility: HOSPITAL | Age: 87
DRG: 640 | End: 2022-07-08
Payer: MEDICARE

## 2022-07-08 PROBLEM — R65.10 SIRS (SYSTEMIC INFLAMMATORY RESPONSE SYNDROME) (HCC): Status: ACTIVE | Noted: 2022-07-08

## 2022-07-08 LAB
ANION GAP SERPL CALCULATED.3IONS-SCNC: 5 MMOL/L (ref 4–13)
BUN SERPL-MCNC: 19 MG/DL (ref 5–25)
CALCIUM SERPL-MCNC: 8.9 MG/DL (ref 8.3–10.1)
CHLORIDE SERPL-SCNC: 103 MMOL/L (ref 100–108)
CO2 SERPL-SCNC: 30 MMOL/L (ref 21–32)
CREAT SERPL-MCNC: 0.5 MG/DL (ref 0.6–1.3)
ERYTHROCYTE [DISTWIDTH] IN BLOOD BY AUTOMATED COUNT: 16.1 % (ref 11.6–15.1)
GFR SERPL CREATININE-BSD FRML MDRD: 88 ML/MIN/1.73SQ M
GLUCOSE SERPL-MCNC: 109 MG/DL (ref 65–140)
HCT VFR BLD AUTO: 28.5 % (ref 34.8–46.1)
HGB BLD-MCNC: 10.3 G/DL (ref 11.5–15.4)
MCH RBC QN AUTO: 32.9 PG (ref 26.8–34.3)
MCHC RBC AUTO-ENTMCNC: 36.1 G/DL (ref 31.4–37.4)
MCV RBC AUTO: 91 FL (ref 82–98)
PLATELET # BLD AUTO: 331 THOUSANDS/UL (ref 149–390)
PMV BLD AUTO: 9.6 FL (ref 8.9–12.7)
POTASSIUM SERPL-SCNC: 4.3 MMOL/L (ref 3.5–5.3)
PROCALCITONIN SERPL-MCNC: 3.9 NG/ML
RBC # BLD AUTO: 3.13 MILLION/UL (ref 3.81–5.12)
SODIUM SERPL-SCNC: 138 MMOL/L (ref 136–145)
WBC # BLD AUTO: 10.53 THOUSAND/UL (ref 4.31–10.16)

## 2022-07-08 PROCEDURE — 84145 PROCALCITONIN (PCT): CPT | Performed by: NURSE PRACTITIONER

## 2022-07-08 PROCEDURE — 97530 THERAPEUTIC ACTIVITIES: CPT

## 2022-07-08 PROCEDURE — 80048 BASIC METABOLIC PNL TOTAL CA: CPT | Performed by: NURSE PRACTITIONER

## 2022-07-08 PROCEDURE — 85027 COMPLETE CBC AUTOMATED: CPT | Performed by: NURSE PRACTITIONER

## 2022-07-08 PROCEDURE — 99232 SBSQ HOSP IP/OBS MODERATE 35: CPT | Performed by: NURSE PRACTITIONER

## 2022-07-08 PROCEDURE — 76705 ECHO EXAM OF ABDOMEN: CPT

## 2022-07-08 RX ORDER — MULTIVIT-MIN/FERROUS GLUCONATE 9 MG/15 ML
15 LIQUID (ML) ORAL DAILY
Status: DISCONTINUED | OUTPATIENT
Start: 2022-07-08 | End: 2022-07-11 | Stop reason: HOSPADM

## 2022-07-08 RX ORDER — METOCLOPRAMIDE HYDROCHLORIDE 5 MG/ML
10 INJECTION INTRAMUSCULAR; INTRAVENOUS EVERY 6 HOURS SCHEDULED
Status: DISCONTINUED | OUTPATIENT
Start: 2022-07-08 | End: 2022-07-11 | Stop reason: HOSPADM

## 2022-07-08 RX ADMIN — METOCLOPRAMIDE HYDROCHLORIDE 10 MG: 5 INJECTION INTRAMUSCULAR; INTRAVENOUS at 13:07

## 2022-07-08 RX ADMIN — PIPERACILLIN AND TAZOBACTAM 3.38 G: 36; 4.5 INJECTION, POWDER, FOR SOLUTION INTRAVENOUS at 07:05

## 2022-07-08 RX ADMIN — METOCLOPRAMIDE HYDROCHLORIDE 10 MG: 5 INJECTION INTRAMUSCULAR; INTRAVENOUS at 07:05

## 2022-07-08 RX ADMIN — COLLAGENASE SANTYL: 250 OINTMENT TOPICAL at 10:34

## 2022-07-08 RX ADMIN — APIXABAN 5 MG: 5 TABLET, FILM COATED ORAL at 18:41

## 2022-07-08 RX ADMIN — SENNOSIDES A AND B 15 ML: 8.8 SYRUP ORAL at 18:43

## 2022-07-08 RX ADMIN — DAKIN'S SOLUTION 0.125% (QUARTER STRENGTH): 0.12 SOLUTION at 10:34

## 2022-07-08 RX ADMIN — PIPERACILLIN AND TAZOBACTAM 3.38 G: 36; 4.5 INJECTION, POWDER, FOR SOLUTION INTRAVENOUS at 13:12

## 2022-07-08 NOTE — PLAN OF CARE
Problem: Potential for Falls  Goal: Patient will remain free of falls  Description: INTERVENTIONS:  - Educate patient/family on patient safety including physical limitations  - Instruct patient to call for assistance with activity   - Consult OT/PT to assist with strengthening/mobility   - Keep Call bell within reach  - Keep bed low and locked with side rails adjusted as appropriate  - Keep care items and personal belongings within reach  - Initiate and maintain comfort rounds  - Make Fall Risk Sign visible to staff  - Offer Toileting every 2 Hours, in advance of need  - Initiate/Maintain bed alarm  - Obtain necessary fall risk management equipment: bed alarm  - Apply yellow socks and bracelet for high fall risk patients  - Consider moving patient to room near nurses station  Outcome: Progressing     Problem: MOBILITY - ADULT  Goal: Maintain or return to baseline ADL function  Description: INTERVENTIONS:  -  Assess patient's ability to carry out ADLs; assess patient's baseline for ADL function and identify physical deficits which impact ability to perform ADLs (bathing, care of mouth/teeth, toileting, grooming, dressing, etc )  - Assess/evaluate cause of self-care deficits   - Assess range of motion  - Assess patient's mobility; develop plan if impaired  - Assess patient's need for assistive devices and provide as appropriate  - Encourage maximum independence but intervene and supervise when necessary  - Involve family in performance of ADLs  - Assess for home care needs following discharge   - Consider OT consult to assist with ADL evaluation and planning for discharge  - Provide patient education as appropriate  Outcome: Progressing  Goal: Maintains/Returns to pre admission functional level  Description: INTERVENTIONS:  - Perform BMAT or MOVE assessment daily    - Set and communicate daily mobility goal to care team and patient/family/caregiver     - Collaborate with rehabilitation services on mobility goals if consulted  - Perform Range of Motion 4 times a day  - Reposition patient every 2 hours    - Out of bed for toileting  - Record patient progress and toleration of activity level   Outcome: Progressing     Problem: Prexisting or High Potential for Compromised Skin Integrity  Goal: Skin integrity is maintained or improved  Description: INTERVENTIONS:  - Identify patients at risk for skin breakdown  - Assess and monitor skin integrity  - Assess and monitor nutrition and hydration status  - Monitor labs   - Assess for incontinence   - Turn and reposition patient  - Assist with mobility/ambulation  - Relieve pressure over bony prominences  - Avoid friction and shearing  - Provide appropriate hygiene as needed including keeping skin clean and dry  - Evaluate need for skin moisturizer/barrier cream  - Collaborate with interdisciplinary team   - Patient/family teaching  - Consider wound care consult   Outcome: Progressing     Problem: GASTROINTESTINAL - ADULT  Goal: Minimal or absence of nausea and/or vomiting  Description: INTERVENTIONS:  - Administer IV fluids if ordered to ensure adequate hydration  - Maintain NPO status until nausea and vomiting are resolved  - Nasogastric tube if ordered  - Administer ordered antiemetic medications as needed  - Provide nonpharmacologic comfort measures as appropriate  - Advance diet as tolerated, if ordered  - Consider nutrition services referral to assist patient with adequate nutrition and appropriate food choices  Outcome: Progressing  Goal: Maintains or returns to baseline bowel function  Description: INTERVENTIONS:  - Assess bowel function  - Encourage oral fluids to ensure adequate hydration  - Administer IV fluids if ordered to ensure adequate hydration  - Administer ordered medications as needed  - Encourage mobilization and activity  - Consider nutritional services referral to assist patient with adequate nutrition and appropriate food choices  Outcome: Progressing  Goal: Maintains adequate nutritional intake  Description: INTERVENTIONS:  - Monitor percentage of each meal consumed  - Identify factors contributing to decreased intake, treat as appropriate  - Assist with meals as needed  - Monitor I&O, weight, and lab values if indicated  - Obtain nutrition services referral as needed  Outcome: Progressing  Goal: Establish and maintain optimal ostomy function  Description: INTERVENTIONS:  - Assess bowel function  - Encourage oral fluids to ensure adequate hydration  - Administer IV fluids if ordered to ensure adequate hydration   - Administer ordered medications as needed  - Encourage mobilization and activity  - Nutrition services referral to assist patient with appropriate food choices  - Assess stoma site  - Consider wound care consult   Outcome: Progressing  Goal: Oral mucous membranes remain intact  Description: INTERVENTIONS  - Assess oral mucosa and hygiene practices  - Implement preventative oral hygiene regimen  - Implement oral medicated treatments as ordered  - Initiate Nutrition services referral as needed  Outcome: Progressing     Problem: SKIN/TISSUE INTEGRITY - ADULT  Goal: Skin Integrity remains intact(Skin Breakdown Prevention)  Description: Assess:  -Inspect skin when repositioning, toileting, and assisting with ADLS  -Assess extremities for adequate circulation and sensation     Bed Management:  -Have minimal linens on bed & keep smooth, unwrinkled  -Change linens as needed when moist or perspiring    Toileting:  -Offer bedside commode    Activity:  -Encourage activity and walks on unit  -Encourage or provide ROM exercises   -Use appropriate equipment to lift or move patient in bed    Skin Care:  -Avoid use of baby powder, tape, friction and shearing, hot water or constrictive clothing  -Do not massage red bony areas    Next Steps:  Outcome: Progressing  Goal: Incision(s), wounds(s) or drain site(s) healing without S/S of infection  Description: INTERVENTIONS  - Assess and document dressing, incision, wound bed, drain sites and surrounding tissue  - Provide patient and family education  Outcome: Progressing  Goal: Pressure injury heals and does not worsen  Description: Interventions:  - Implement low air loss mattress or specialty surface (Criteria met)  - Apply silicone foam dressing  - Apply fecal or urinary incontinence containment device   - Perform passive or active ROM every 6 hours  - Turn and reposition patient & offload bony prominences every 2 hours   - Utilize friction reducing device or surface for transfers   - Consider consults to  interdisciplinary teams such as bed alarm  - Consider nutrition services referral as needed  Outcome: Progressing     Problem: MUSCULOSKELETAL - ADULT  Goal: Maintain or return mobility to safest level of function  Description: INTERVENTIONS:  - Assess patient's ability to carry out ADLs; assess patient's baseline for ADL function and identify physical deficits which impact ability to perform ADLs (bathing, care of mouth/teeth, toileting, grooming, dressing, etc )  - Assess/evaluate cause of self-care deficits   - Assess range of motion  - Assess patient's mobility  - Assess patient's need for assistive devices and provide as appropriate  - Encourage maximum independence but intervene and supervise when necessary  - Involve family in performance of ADLs  - Assess for home care needs following discharge   - Consider OT consult to assist with ADL evaluation and planning for discharge  - Provide patient education as appropriate  Outcome: Progressing  Goal: Maintain proper alignment of affected body part  Description: INTERVENTIONS:  - Support, maintain and protect limb and body alignment  - Provide patient/ family with appropriate education  Outcome: Progressing     Problem: PAIN - ADULT  Goal: Verbalizes/displays adequate comfort level or baseline comfort level  Description: Interventions:  - Encourage patient to monitor pain and request assistance  - Assess pain using appropriate pain scale  - Administer analgesics based on type and severity of pain and evaluate response  - Implement non-pharmacological measures as appropriate and evaluate response  - Consider cultural and social influences on pain and pain management  - Notify physician/advanced practitioner if interventions unsuccessful or patient reports new pain  Outcome: Progressing     Problem: INFECTION - ADULT  Goal: Absence or prevention of progression during hospitalization  Description: INTERVENTIONS:  - Assess and monitor for signs and symptoms of infection  - Monitor lab/diagnostic results  - Monitor all insertion sites, i e  indwelling lines, tubes, and drains  - Monitor endotracheal if appropriate and nasal secretions for changes in amount and color  - [place] appropriate cooling/warming therapies per order  - Administer medications as ordered  - Instruct and encourage patient and family to use good hand hygiene technique  - Identify and instruct in appropriate isolation precautions for identified infection/condition  Outcome: Progressing     Problem: SAFETY ADULT  Goal: Patient will remain free of falls  Description: INTERVENTIONS:  - Educate patient/family on patient safety including physical limitations  - Instruct patient to call for assistance with activity   - Consult OT/PT to assist with strengthening/mobility   - Keep Call bell within reach  - Keep bed low and locked with side rails adjusted as appropriate  - Keep care items and personal belongings within reach  - Initiate and maintain comfort rounds  - Make Fall Risk Sign visible to staff  - Offer Toileting every 2 Hours, in advance of need  - Initiate/Maintain bed alarm  - Obtain necessary fall risk management equipment: bed alarm  - Apply yellow socks and bracelet for high fall risk patients  - Consider moving patient to room near nurses station  Outcome: Progressing  Goal: Maintain or return to baseline ADL function  Description: INTERVENTIONS:  -  Assess patient's ability to carry out ADLs; assess patient's baseline for ADL function and identify physical deficits which impact ability to perform ADLs (bathing, care of mouth/teeth, toileting, grooming, dressing, etc )  - Assess/evaluate cause of self-care deficits   - Assess range of motion  - Assess patient's mobility; develop plan if impaired  - Assess patient's need for assistive devices and provide as appropriate  - Encourage maximum independence but intervene and supervise when necessary  - Involve family in performance of ADLs  - Assess for home care needs following discharge   - Consider OT consult to assist with ADL evaluation and planning for discharge  - Provide patient education as appropriate  Outcome: Progressing  Goal: Maintains/Returns to pre admission functional level  Description: INTERVENTIONS:  - Perform BMAT or MOVE assessment daily    - Set and communicate daily mobility goal to care team and patient/family/caregiver  - Collaborate with rehabilitation services on mobility goals if consulted  - Perform Range of Motion 4 times a day  - Reposition patient every 2 hours    - Out of bed for toileting  - Record patient progress and toleration of activity level   Outcome: Progressing     Problem: DISCHARGE PLANNING  Goal: Discharge to home or other facility with appropriate resources  Description: INTERVENTIONS:  - Identify barriers to discharge w/patient and caregiver  - Arrange for needed discharge resources and transportation as appropriate  - Identify discharge learning needs (meds, wound care, etc )  - Arrange for interpretive services to assist at discharge as needed  - Refer to Case Management Department for coordinating discharge planning if the patient needs post-hospital services based on physician/advanced practitioner order or complex needs related to functional status, cognitive ability, or social support system  Outcome: Progressing Problem: Knowledge Deficit  Goal: Patient/family/caregiver demonstrates understanding of disease process, treatment plan, medications, and discharge instructions  Description: Complete learning assessment and assess knowledge base  Interventions:  - Provide teaching at level of understanding  - Provide teaching via preferred learning methods  Outcome: Progressing     Problem: Nutrition/Hydration-ADULT  Goal: Nutrient/Hydration intake appropriate for improving, restoring or maintaining nutritional needs  Description: Monitor and assess patient's nutrition/hydration status for malnutrition  Collaborate with interdisciplinary team and initiate plan and interventions as ordered  Monitor patient's weight and dietary intake as ordered or per policy  Utilize nutrition screening tool and intervene as necessary     INTERVENTIONS:  - Monitor intake, urinary output, labs, and treatment plans  - Assess nutrition and hydration status and recommend course of action  - Recommend/ encourage appropriate diets, oral nutritional supplements, and vitamin/mineral supplements  - Order, calculate, and assess calorie counts as needed  - Recommend, monitor, and adjust tube feedings based on assessed needs  - Assess need for intravenous fluids  - Provide nutrition/hydration education as appropriate  - Include patient/family/caregiver in decisions related to nutrition  Outcome: Progressing

## 2022-07-08 NOTE — NURSING NOTE
Patient's tube feed put on hold as instructed in order for patient to get ultrasound  Tube feed must be on hold for at least 5 hours in order for ordered ultrasound to be done  Tube feed placed on hold at 0907  [facility] aware

## 2022-07-08 NOTE — NURSING NOTE
Per on call SLIM, continue to keep patient's tube feeds on hold  Medications can be given through peg tube but tube feeds must remain on hold at this time

## 2022-07-08 NOTE — ASSESSMENT & PLAN NOTE
Malnutrition Findings:   Adult Malnutrition type: Chronic illness  Adult Degree of Malnutrition: Other severe protein calorie malnutrition  Malnutrition Characteristics: Fat loss, Inadequate energy, Weight loss   360 Statement: Malnutrition related to dementia as evidenced by 14#(9%) weight loss from 5/14/# to 7/1/#, <75% energy intake compared to estimated needs >1 month, temporal wasting, sunken orbitals  BMI Findings: Body mass index is 24 02 kg/m²  · Nutrition recommendations appreciated  · Status post PEG tube placement 07/02/2022  · Was advanced to goal rate of 60 mL/hr on 7/6, tolerated well however, episode of vomiting this morning  Tube feed held at this time    · Patient was noted to have large amount of residual in her stomach when checked by RN, 300 mL  · Tube feed was held for several hours, residual was rechecked and was only 20 mL  · Reglan was added and tube feed was restated at 10 mL/hr  · Tube feed recommended by Nutrition  · Goal is: Jevity 1 2 at 60 mL/hr via PEG tube with 100 mL free water flush every 4 hours  · Speech following cleared for  · Puree/thin  · 1:1 feeding assist  · Meds whole in puree

## 2022-07-08 NOTE — PHYSICAL THERAPY NOTE
PHYSICAL THERAPY TREATMENT  NAME:  Douglas Egsam  DATE: 07/08/22    AGE:   81 y o  Mrn:   [id number]  ADMIT DX:  Severe protein-calorie malnutrition ([place] [address] ) [E43]  Weight loss [R63 4]  Problem List:   Patient Active Problem List   Diagnosis    DVT (deep venous thrombosis) ([place] [address] )    Essential hypertension    Dementia with behavioral disturbance (HCC)    Generalized weakness    Severe protein-calorie malnutrition (HCC)    Electrolyte disturbance    Stage IV pressure ulcer (HCC)    SIRS (systemic inflammatory response syndrome) ([place] [address] )       Past Medical History  Past Medical History:   Diagnosis Date    A-fib ([place] [address] )     Dementia ([place] [address] )     DVT (deep venous thrombosis) ([place] [address] )     Hypertension     Pressure ulcer of sacral region        Past Surgical History  History reviewed  No pertinent surgical history  Length Of Stay: 6  Performed at least 2 patient identifiers during session: Name and ID bracelet       07/08/22 1430   PT Last Visit   PT Visit Date 07/08/22   Note Type   Note Type Treatment  (DAWIT Calhoun confirmed pt appropriate for PT session)   Pain Assessment   Pain Assessment Tool FLACC  (when directly asked if in pain, stated "I don't know")   Pain Rating: FLACC (Rest) - Face 0   Pain Rating: FLACC (Rest) - Legs 0   Pain Rating: FLACC (Rest) - Activity 0   Pain Rating: FLACC (Rest) - Cry 0   Pain Rating: FLACC (Rest) - Consolability 0   Score: FLACC (Rest) 0   Pain Rating: FLACC (Activity) - Face 2   Pain Rating: FLACC (Activity) - Legs 0   Pain Rating: FLACC (Activity) - Activity 1   Pain Rating: FLACC (Activity) - Cry 2   Pain Rating: FLACC (Activity) - Consolability 2   Score: FLACC (Activity) 7   Restrictions/Precautions   Weight Bearing Precautions Per Order No   Other Precautions Fall Risk;Multiple lines; Bed Alarm;Cognitive;Combative;Agitated;Pain   General   Chart Reviewed Yes   Response to Previous Treatment Patient with no complaints from previous session     Family/Caregiver Present No   Cognition Overall Cognitive Status Impaired   Arousal/Participation Responsive   Attention Difficulty attending to directions   Comments pt agreed to PT session   Subjective   Subjective "Just leave me alone, I'll get up on my own when I'm good and ready"   Bed Mobility   Rolling R 2  Maximal assistance   Additional items Assist x 2;Bedrails; Increased time required;Verbal cues;LE management   Rolling L 2  Maximal assistance   Additional items Assist x 2;Bedrails; Increased time required;Verbal cues;LE management   Supine to Sit   (not tested as pt with increased agitation and calling out - unable to describe if in pain or reason for calling out; further mobility outside of rolling deffered this date)   Additional Comments Vitals at start of session HR: 105bpm, Spo2: 99% on RA, BP: 121/62mmHg  (Assist with RN Karen Mesa for rolling and repositioning in bed for pressure relief with wedges)   Transfers   Additional Comments not tested due to safety concern   Endurance Deficit   Endurance Deficit Yes   Endurance Deficit Description limited by agitation and cognition   Activity Tolerance   Activity Tolerance Treatment limited secondary to agitation   Nurse Made Aware RN made aware of session outcomes and present to assist in rolling/repositioning   Exercises   Ankle Pumps 20 reps; Supine;AROM; Bilateral   Assessment   Prognosis Guarded   Problem List Decreased strength;Decreased range of motion;Decreased endurance; Impaired balance;Decreased mobility; Decreased cognition;Decreased safety awareness;Pain;Decreased skin integrity   Assessment Pt seen for PT treatment session this date, consisting of ther act focused on bed mobility  Since previous session, pt has made minimal progress in terms of increasing activity tolerance and response to PT cues and intervention  This date, patient greeted in bed in slight R side lying   Patient performed rolling R/L maxAx2 with max verbal and tactile cues for use of bed rails to assist with mobility and position UE, trunk, and LE safely during mobility  Pt limited in participation in presence of agitation and calling out, unable to state if in pain or reason for calling out; once safely positioned for pressure relief in slight L side lying with wedges in place for buttock off loading, patient returned to resting state without calling out and in no apparent distress  Patient able to follow cues for AROM ankle pumps in supine; however, unable to direct for further supine range of motion/intervention  Pertinent barriers during this session include cognitive status and agitation  Current goals and POC remain appropriate, pt continues to have rehab potential and is making progress towards STGs  Pt prognosis for achieving goals is guarded, pending pt progress with hospitalization/medical status improvements, and indicated by orientation, attention span, motivation, learning potential, awareness and responsive to cues/strategies  Pt limited d/t fear of pain provocation, poor orientation, inability to concentrate under maximum structure, lack of ability to initiate activity, self limiting and non compliance  PT recommends post acute rehabilitation services upon discharge  Pt continues to be functioning below baseline level, and remains limited 2* factors listed above  PT will continue to see pt during current hospitalization in order to address the deficits listed above and provide interventions consistent w/ POC in effort to achieve STGs  Goals   Short Term Goal #1 goals remain appropraite  Continue with plan of care   PT Treatment Day 1   Plan   Treatment/Interventions LE strengthening/ROM; Therapeutic exercise; Endurance training;Patient/family training;Bed mobility;Spoke to nursing   Progress Slow progress, cognitive deficits   Recommendation   PT Discharge Recommendation Post acute rehabilitation services   AM-PAC Basic Mobility Inpatient   Turning in Bed Without Bedrails 1   Lying on Back to Sitting on Edge of Flat Bed 1   Moving Bed to Chair 1   Standing Up From Chair 1   Walk in Room 1   Climb 3-5 Stairs 1   Basic Mobility Inpatient Raw Score 6   Turning Head Towards Sound 3   Follow Simple Instructions 2   Low Function Basic Mobility Raw Score 11   Low Function Basic Mobility Standardized Score 16 55   Highest Level Of Mobility   -HLM Goal 2: Bed activities/Dependent transfer   -HLM Achieved 2: Bed activities/Dependent transfer   End of Consult   Patient Position at End of Consult All needs within reach;Bed/Chair alarm activated  (in bed with HOB elevated)       Time In: 1430  Time Out: Lelia 52  Total Treatment Minutes: PHILLIP Kent

## 2022-07-08 NOTE — PROGRESS NOTES
[address] [facility]  Progress Note - Nathalia Allen 1935, 81 y o  female MRN: [id number]  Unit/Bed#: -01 Encounter: [id number]  Primary Care Provider: Don Allen   Date and time admitted to hospital: 7/1/2022  4:10 PM    * Severe protein-calorie malnutrition ([facility] )  Assessment & Plan  Malnutrition Findings:   Adult Malnutrition type: Chronic illness  Adult Degree of Malnutrition: Other severe protein calorie malnutrition  Malnutrition Characteristics: Fat loss, Inadequate energy, Weight loss   360 Statement: Malnutrition related to dementia as evidenced by 14#(9%) weight loss from 5/14/# to 7/1/#, <75% energy intake compared to estimated needs >1 month, temporal wasting, sunken orbitals  BMI Findings: Body mass index is 24 02 kg/m²  · Nutrition recommendations appreciated  · Status post PEG tube placement 07/02/2022  · Was advanced to goal rate of 60 mL/hr on 7/6, tolerated well however, episode of vomiting this morning  Tube feed held at this time  · Patient was noted to have large amount of residual in her stomach when checked by RN, 300 mL  · Tube feed was held for several hours, residual was rechecked and was only 20 mL  · Reglan was added and tube feed was restated at 10 mL/hr  · Tube feed recommended by Nutrition  · Goal is: Jevity 1 2 at 60 mL/hr via PEG tube with 100 mL free water flush every 4 hours  · Speech following cleared for  · Puree/thin  · 1:1 feeding assist  · Meds whole in puree     SIRS (systemic inflammatory response syndrome) ([facility] 75 )  Assessment & Plan  · Noted yesterday evidenced by tachycardia, leukocytosis  · Does have an elevated procalcitonin  · Unknown cause at this time  · Chest x-ray was completed to rule out aspiration; no sign of aspiration  · CT Chest/Abd/Pelvis (7/7/22):  There is a decubitus ulcer overlying the coccyx, with soft tissue sinus tract extending toward the left (series 2 images 102- 107 ) There is no soft tissue abscess, and no bony changes to suggest osteomyelitis  There is a 5 3 cm diameter gallstone in the gallbladder  Gallbladder is distended  No pericholecystic inflammatory changes  Small bilateral water density pleural effusions  · Obtain RUQ US  · Initiated on Zosyn 3 375 mg every 6 hours for now  · Procalcitonin trending down  Stage IV pressure ulcer (HCC)  Assessment & Plan  · Stage 4 Pressure Injury of Sacrum, POA, in setting of advanced dementia evidenced by wound care documentation on 7/4/22 treated with Dakin's rinse/ Santyl, Ehob cushion when out of bed, low air loss mattress and turn/reposition      Electrolyte disturbance  Assessment & Plan  · Potassium  · Now resolved, 4 3  · Magnesium 1 9; stable  · Nutrition recommendations appreciated    Generalized weakness  Assessment & Plan  · Patient with generalized weakness and according to son, not pleased with patient's care at her prior nursing facility  · PT/OT, STR  · Case management for placement    Dementia with behavioral disturbance ([facility] )  Assessment & Plan  · Currently at baseline  · Delirium precautions    Essential hypertension  Assessment & Plan  · Well controlled, 148-150/80s  · Not on home meds  · Continue to monitor    DVT (deep venous thrombosis) ([facility] )  Assessment & Plan  · Eliquis previously on hold for PEG tube placement, now restarted  VTE Pharmacologic Prophylaxis: VTE Score: 6 High Risk (Score >/= 5) - Pharmacological DVT Prophylaxis Ordered: apixaban (Eliquis)  Sequential Compression Devices Ordered  Patient Centered Rounds: I performed bedside rounds with nursing staff today  Discussions with Specialists or Other Care Team Provider: Case management    Education and Discussions with Family / Patient: Call placed to Usha castillo  Time Spent for Care: 20 minutes  More than 50% of total time spent on counseling and coordination of care as described above      Current Length of Stay: 6 day(s)  Current Patient Status: Inpatient Certification Statement: The patient will continue to require additional inpatient hospital stay due to ongoing treatment in setting of pending placement; gallstone work up  Discharge Plan: Anticipate discharge in 24-48 hrs to TBD pending placement  Code Status: Level 1 - Full Code    Subjective:   Patient resting in bed, sleeping but woke up to my voice  Denies abdominal pain, nausea/vomiting  Son reports patient has history of gallstone; had a jarrett drain in place at one point which has been removed  Objective:     Vitals:   Temp (24hrs), Av 2 °F (37 3 °C), Min:99 2 °F (37 3 °C), Max:99 2 °F (37 3 °C)    Temp:  [99 2 °F (37 3 °C)] 99 2 °F (37 3 °C)  HR:  [] 123  Resp:  [16-18] 18  BP: (148)/(81) 148/81  SpO2:  [99 %] 99 %  Body mass index is 24 02 kg/m²  Input and Output Summary (last 24 hours): Intake/Output Summary (Last 24 hours) at 2022 0919  Last data filed at 2022 1800  Gross per 24 hour   Intake 200 ml   Output --   Net 200 ml       Physical Exam:   Physical Exam  Vitals and nursing note reviewed  Constitutional:       General: She is not in acute distress  Appearance: She is normal weight  She is not ill-appearing  Cardiovascular:      Rate and Rhythm: Normal rate  Pulses: Normal pulses  Pulmonary:      Effort: Pulmonary effort is normal       Breath sounds: Normal breath sounds  Abdominal:      General: Bowel sounds are normal  There is no distension  Palpations: Abdomen is soft  Tenderness: There is no abdominal tenderness  Musculoskeletal:         General: Normal range of motion  Skin:     General: Skin is warm and dry  Coloration: Skin is pale  Neurological:      Mental Status: She is alert  Mental status is at baseline     Psychiatric:         Mood and Affect: Mood normal           Additional Data:     Labs:  Results from last 7 days   Lab Units 22  0642 22  0512 22  0640   WBC Thousand/uL 10 53*   < > 5 90 HEMOGLOBIN g/dL 10 3*   < > 10 6*   HEMATOCRIT % 28 5*   < > 32 1*   PLATELETS Thousands/uL 331   < > 258   NEUTROS PCT %  --   --  65   LYMPHS PCT %  --   --  24   MONOS PCT %  --   --  10   EOS PCT %  --   --  1    < > = values in this interval not displayed  Results from last 7 days   Lab Units 07/08/22  0642 07/04/22  1611 07/04/22  0631   SODIUM mmol/L 138   < > 146*   POTASSIUM mmol/L 4 3   < > 3 4*   CHLORIDE mmol/L 103   < > 112*   CO2 mmol/L 30   < > 24   BUN mg/dL 19   < > 13   CREATININE mg/dL 0 50*   < > 0 55*   ANION GAP mmol/L 5   < > 10   CALCIUM mg/dL 8 9   < > 8 5   ALBUMIN g/dL  --   --  1 8*   TOTAL BILIRUBIN mg/dL  --   --  0 95   ALK PHOS U/L  --   --  57   ALT U/L  --   --  9*   AST U/L  --   --  30   GLUCOSE RANDOM mg/dL 109   < > 93    < > = values in this interval not displayed  Results from last 7 days   Lab Units 07/01/22  1718   INR  1 41*             Results from last 7 days   Lab Units 07/08/22  0642 07/07/22  0905   PROCALCITONIN ng/ml 3 90* 8 47*       Lines/Drains:  Invasive Devices  Report    Peripheral Intravenous Line  Duration           Peripheral IV 07/08/22 Right Wrist <1 day          Drain  Duration           Gastrostomy/Enterostomy Percutaneous Endoscopic Gastrostomy (PEG) 20 Fr  LUQ 5 days                Imaging: No pertinent imaging reviewed      Recent Cultures (last 7 days):         Last 24 Hours Medication List:   Current Facility-Administered Medications   Medication Dose Route Frequency Provider Last Rate    acetaminophen  650 mg Oral Q6H PRN [employer], PA-C      apixaban  5 mg Per PEG Tube BID JUDIE Long      collagenase   Topical Daily JUDIE Long      metoclopramide  10 mg Intravenous Q6H [facility] JUDIE Long      piperacillin-tazobactam  3 375 g Intravenous Q6H JUDIE Long 3 375 g (07/08/22 0705)    sodium hypochlorite   Irrigation Daily JUDIE Long          Today, Patient Was Seen By: JUDIE Guy    **Please Note: This note may have been constructed using a voice recognition system  **

## 2022-07-08 NOTE — ASSESSMENT & PLAN NOTE
· Potassium  · Now resolved, 4 3    · Magnesium 1 9; stable  · Nutrition recommendations appreciated

## 2022-07-08 NOTE — PLAN OF CARE
Problem: Potential for Falls  Goal: Patient will remain free of falls  Description: INTERVENTIONS:  - Educate patient/family on patient safety including physical limitations  - Instruct patient to call for assistance with activity   - Consult OT/PT to assist with strengthening/mobility   - Keep Call bell within reach  - Keep bed low and locked with side rails adjusted as appropriate  - Keep care items and personal belongings within reach  - Initiate and maintain comfort rounds  - Make Fall Risk Sign visible to staff  - Offer Toileting every 2 Hours, in advance of need  - Initiate/Maintain bed/chair alarm  - Apply yellow socks and bracelet for high fall risk patients  - Consider moving patient to room near nurses station  Outcome: Progressing     Problem: Prexisting or High Potential for Compromised Skin Integrity  Goal: Skin integrity is maintained or improved  Description: INTERVENTIONS:  - Identify patients at risk for skin breakdown  - Assess and monitor skin integrity  - Assess and monitor nutrition and hydration status  - Monitor labs   - Assess for incontinence   - Turn and reposition patient  - Assist with mobility/ambulation  - Relieve pressure over bony prominences  - Avoid friction and shearing  - Provide appropriate hygiene as needed including keeping skin clean and dry  - Evaluate need for skin moisturizer/barrier cream  - Collaborate with interdisciplinary team   - Patient/family teaching  - Consider wound care consult   Outcome: Progressing     Problem: Nutrition/Hydration-ADULT  Goal: Nutrient/Hydration intake appropriate for improving, restoring or maintaining nutritional needs  Description: Monitor and assess patient's nutrition/hydration status for malnutrition  Collaborate with interdisciplinary team and initiate plan and interventions as ordered  Monitor patient's weight and dietary intake as ordered or per policy  Utilize nutrition screening tool and intervene as necessary  INTERVENTIONS:  - Monitor intake, urinary output, labs, and treatment plans  - Assess nutrition and hydration status and recommend course of action  - Recommend/ encourage appropriate diets, oral nutritional supplements, and vitamin/mineral supplements  - Order, calculate, and assess calorie counts as needed  - Recommend, monitor, and adjust tube feedings based on assessed needs  - Assess need for intravenous fluids  - Provide nutrition/hydration education as appropriate  - Include patient/family/caregiver in decisions related to nutrition  Outcome: Progressing

## 2022-07-08 NOTE — ASSESSMENT & PLAN NOTE
· Noted yesterday evidenced by tachycardia, leukocytosis  · Does have an elevated procalcitonin  · Unknown cause at this time  · Chest x-ray was completed to rule out aspiration; no sign of aspiration  · CT Chest/Abd/Pelvis (7/7/22): There is a decubitus ulcer overlying the coccyx, with soft tissue sinus tract extending toward the left (series 2 images 102- 107 ) There is no soft tissue abscess, and no bony changes to suggest osteomyelitis  There is a 5 3 cm diameter gallstone in the gallbladder  Gallbladder is distended  No pericholecystic inflammatory changes  Small bilateral water density pleural effusions  · Obtain RUQ US  · Initiated on Zosyn 3 375 mg every 6 hours for now  · Procalcitonin trending down

## 2022-07-08 NOTE — NURSING NOTE
Residual of 215 ml noted from feeding tube, per Mk Roberto, wait for dietician to assess patient before giving medications/feeds through peg tube

## 2022-07-08 NOTE — PLAN OF CARE
Problem: PHYSICAL THERAPY ADULT  Goal: Performs mobility at highest level of function for planned discharge setting  See evaluation for individualized goals  Description: Treatment/Interventions: LE strengthening/ROM, Therapeutic exercise, Endurance training, Patient/family training, Equipment eval/education, Bed mobility, Cognitive reorientation, Continued evaluation, Spoke to nursing, OT, Spoke to case management  Equipment Recommended:  (TBD)       See flowsheet documentation for full assessment, interventions and recommendations  Outcome: Progressing  Note: Prognosis: Guarded  Problem List: Decreased strength, Decreased range of motion, Decreased endurance, Impaired balance, Decreased mobility, Decreased cognition, Decreased safety awareness, Pain, Decreased skin integrity  Assessment: Pt seen for PT treatment session this date, consisting of ther act focused on bed mobility  Since previous session, pt has made minimal progress in terms of increasing activity tolerance and response to PT cues and intervention  This date, patient greeted in bed in slight R side lying  Patient performed rolling R/L maxAx2 with max verbal and tactile cues for use of bed rails to assist with mobility and position UE, trunk, and LE safely during mobility  Pt limited in participation in presence of agitation and calling out, unable to state if in pain or reason for calling out; once safely positioned for pressure relief in slight L side lying with wedges in place for buttock off loading, patient returned to resting state without calling out and in no apparent distress  Patient able to follow cues for AROM ankle pumps in supine; however, unable to direct for further supine range of motion/intervention  Pertinent barriers during this session include cognitive status and agitation  Current goals and POC remain appropriate, pt continues to have rehab potential and is making progress towards STGs   Pt prognosis for achieving goals is guarded, pending pt progress with hospitalization/medical status improvements, and indicated by orientation, attention span, motivation, learning potential, awareness and responsive to cues/strategies  Pt limited d/t fear of pain provocation, poor orientation, inability to concentrate under maximum structure, lack of ability to initiate activity, self limiting and non compliance  PT recommends post acute rehabilitation services upon discharge  Pt continues to be functioning below baseline level, and remains limited 2* factors listed above  PT will continue to see pt during current hospitalization in order to address the deficits listed above and provide interventions consistent w/ POC in effort to achieve STGs  Barriers to Discharge: Other (Comment) (unknown PLOF/ home environment; CM to follow up)        PT Discharge Recommendation: Post acute rehabilitation services          See flowsheet documentation for full assessment     Caesar Griggs, PT

## 2022-07-09 PROCEDURE — 99232 SBSQ HOSP IP/OBS MODERATE 35: CPT | Performed by: INTERNAL MEDICINE

## 2022-07-09 RX ADMIN — COLLAGENASE SANTYL: 250 OINTMENT TOPICAL at 09:57

## 2022-07-09 RX ADMIN — APIXABAN 5 MG: 5 TABLET, FILM COATED ORAL at 17:38

## 2022-07-09 RX ADMIN — PIPERACILLIN AND TAZOBACTAM 3.38 G: 36; 4.5 INJECTION, POWDER, FOR SOLUTION INTRAVENOUS at 10:58

## 2022-07-09 RX ADMIN — PIPERACILLIN AND TAZOBACTAM 3.38 G: 36; 4.5 INJECTION, POWDER, FOR SOLUTION INTRAVENOUS at 16:57

## 2022-07-09 RX ADMIN — APIXABAN 5 MG: 5 TABLET, FILM COATED ORAL at 09:56

## 2022-07-09 RX ADMIN — SENNOSIDES A AND B 15 ML: 8.8 SYRUP ORAL at 12:29

## 2022-07-09 RX ADMIN — METOCLOPRAMIDE HYDROCHLORIDE 10 MG: 5 INJECTION INTRAMUSCULAR; INTRAVENOUS at 17:38

## 2022-07-09 RX ADMIN — PIPERACILLIN AND TAZOBACTAM 3.38 G: 36; 4.5 INJECTION, POWDER, FOR SOLUTION INTRAVENOUS at 22:57

## 2022-07-09 RX ADMIN — METOCLOPRAMIDE HYDROCHLORIDE 10 MG: 5 INJECTION INTRAMUSCULAR; INTRAVENOUS at 11:25

## 2022-07-09 RX ADMIN — METOCLOPRAMIDE HYDROCHLORIDE 10 MG: 5 INJECTION INTRAMUSCULAR; INTRAVENOUS at 23:01

## 2022-07-09 RX ADMIN — PIPERACILLIN AND TAZOBACTAM 3.38 G: 36; 4.5 INJECTION, POWDER, FOR SOLUTION INTRAVENOUS at 01:48

## 2022-07-09 RX ADMIN — METOCLOPRAMIDE HYDROCHLORIDE 10 MG: 5 INJECTION INTRAMUSCULAR; INTRAVENOUS at 01:48

## 2022-07-09 RX ADMIN — DAKIN'S SOLUTION 0.125% (QUARTER STRENGTH): 0.12 SOLUTION at 09:57

## 2022-07-09 RX ADMIN — PIPERACILLIN AND TAZOBACTAM 3.38 G: 36; 4.5 INJECTION, POWDER, FOR SOLUTION INTRAVENOUS at 03:42

## 2022-07-09 NOTE — PLAN OF CARE
Pt pulled IV out twice last night and Abx were off schedule due to IV access attempts    Problem: Potential for Falls  Goal: Patient will remain free of falls  Description: INTERVENTIONS:  - Educate patient/family on patient safety including physical limitations  - Instruct patient to call for assistance with activity   - Consult OT/PT to assist with strengthening/mobility   - Keep Call bell within reach  - Keep bed low and locked with side rails adjusted as appropriate  - Keep care items and personal belongings within reach  - Initiate and maintain comfort rounds  - Make Fall Risk Sign visible to staff  - Offer Toileting every 2 Hours, in advance of need  - Initiate/Maintain Bed alarm  - Obtain necessary fall risk management equipment: Bed Alarm  - Apply yellow socks and bracelet for high fall risk patients  - Consider moving patient to room near nurses station  Outcome: Progressing     Problem: MOBILITY - ADULT  Goal: Maintain or return to baseline ADL function  Description: INTERVENTIONS:  -  Assess patient's ability to carry out ADLs; assess patient's baseline for ADL function and identify physical deficits which impact ability to perform ADLs (bathing, care of mouth/teeth, toileting, grooming, dressing, etc )  - Assess/evaluate cause of self-care deficits   - Assess range of motion  - Assess patient's mobility; develop plan if impaired  - Assess patient's need for assistive devices and provide as appropriate  - Encourage maximum independence but intervene and supervise when necessary  - Involve family in performance of ADLs  - Assess for home care needs following discharge   - Consider OT consult to assist with ADL evaluation and planning for discharge  - Provide patient education as appropriate  Outcome: Progressing  Goal: Maintains/Returns to pre admission functional level  Description: INTERVENTIONS:  - Perform BMAT or MOVE assessment daily    - Set and communicate daily mobility goal to care team and patient/family/caregiver  - Collaborate with rehabilitation services on mobility goals if consulted  - Perform Range of Motion 4 times a day  - Reposition patient every 2 hours    - Dangle patient 4 times a day  - Stand patient 4 times a day  - Ambulate patient 4 times a day  - Out of bed to chair 3 times a day   - Out of bed for meals 3 times a day  - Out of bed for toileting  - Record patient progress and toleration of activity level   Outcome: Progressing     Problem: Prexisting or High Potential for Compromised Skin Integrity  Goal: Skin integrity is maintained or improved  Description: INTERVENTIONS:  - Identify patients at risk for skin breakdown  - Assess and monitor skin integrity  - Assess and monitor nutrition and hydration status  - Monitor labs   - Assess for incontinence   - Turn and reposition patient  - Assist with mobility/ambulation  - Relieve pressure over bony prominences  - Avoid friction and shearing  - Provide appropriate hygiene as needed including keeping skin clean and dry  - Evaluate need for skin moisturizer/barrier cream  - Collaborate with interdisciplinary team   - Patient/family teaching  - Consider wound care consult   Outcome: Progressing     Problem: GASTROINTESTINAL - ADULT  Goal: Minimal or absence of nausea and/or vomiting  Description: INTERVENTIONS:  - Administer IV fluids if ordered to ensure adequate hydration  - Maintain NPO status until nausea and vomiting are resolved  - Nasogastric tube if ordered  - Administer ordered antiemetic medications as needed  - Provide nonpharmacologic comfort measures as appropriate  - Advance diet as tolerated, if ordered  - Consider nutrition services referral to assist patient with adequate nutrition and appropriate food choices  Outcome: Progressing  Goal: Maintains or returns to baseline bowel function  Description: INTERVENTIONS:  - Assess bowel function  - Encourage oral fluids to ensure adequate hydration  - Administer IV fluids if ordered to ensure adequate hydration  - Administer ordered medications as needed  - Encourage mobilization and activity  - Consider nutritional services referral to assist patient with adequate nutrition and appropriate food choices  Outcome: Progressing  Goal: Maintains adequate nutritional intake  Description: INTERVENTIONS:  - Monitor percentage of each meal consumed  - Identify factors contributing to decreased intake, treat as appropriate  - Assist with meals as needed  - Monitor I&O, weight, and lab values if indicated  - Obtain nutrition services referral as needed  Outcome: Progressing  Goal: Establish and maintain optimal ostomy function  Description: INTERVENTIONS:  - Assess bowel function  - Encourage oral fluids to ensure adequate hydration  - Administer IV fluids if ordered to ensure adequate hydration   - Administer ordered medications as needed  - Encourage mobilization and activity  - Nutrition services referral to assist patient with appropriate food choices  - Assess stoma site  - Consider wound care consult   Outcome: Progressing  Goal: Oral mucous membranes remain intact  Description: INTERVENTIONS  - Assess oral mucosa and hygiene practices  - Implement preventative oral hygiene regimen  - Implement oral medicated treatments as ordered  - Initiate Nutrition services referral as needed  Outcome: Progressing     Problem: PAIN - ADULT  Goal: Verbalizes/displays adequate comfort level or baseline comfort level  Description: Interventions:  - Encourage patient to monitor pain and request assistance  - Assess pain using appropriate pain scale  - Administer analgesics based on type and severity of pain and evaluate response  - Implement non-pharmacological measures as appropriate and evaluate response  - Consider cultural and social influences on pain and pain management  - Notify physician/advanced practitioner if interventions unsuccessful or patient reports new pain  Outcome: Progressing Problem: INFECTION - ADULT  Goal: Absence or prevention of progression during hospitalization  Description: INTERVENTIONS:  - Assess and monitor for signs and symptoms of infection  - Monitor lab/diagnostic results  - Monitor all insertion sites, i e  indwelling lines, tubes, and drains  - Monitor endotracheal if appropriate and nasal secretions for changes in amount and color  - [place] appropriate cooling/warming therapies per order  - Administer medications as ordered  - Instruct and encourage patient and family to use good hand hygiene technique  - Identify and instruct in appropriate isolation precautions for identified infection/condition  Outcome: Progressing

## 2022-07-09 NOTE — PROGRESS NOTES
[address] [facility]  Progress Note - Janine Reynaga 1935, 81 y o  female MRN: [id number]  Unit/Bed#: -01 Encounter: [id number]  Primary Care Provider: Deondre Villa   Date and time admitted to hospital: 7/1/2022  4:10 PM    SIRS (systemic inflammatory response syndrome) ([facility] )  Assessment & Plan  · Noted yesterday evidenced by tachycardia, leukocytosis  · Does have an elevated procalcitonin  · Unknown cause at this time  · Chest x-ray was completed to rule out aspiration; no sign of aspiration  · CT abdomen and right upper quadrant ultrasound revealed no acute infection  · Currently on IV Zosyn  · WBC count improving  · Will check UA    Stage IV pressure ulcer (HCC)  Assessment & Plan  · Stage 4 Pressure Injury of Sacrum, POA, in setting of advanced dementia evidenced by wound care documentation on 7/4/22 treated with Dakin's rinse/ Santyl, Ehob cushion when out of bed, low air loss mattress and turn/reposition      Generalized weakness  Assessment & Plan  · Patient with generalized weakness and according to son, not pleased with patient's care at her prior nursing facility  · PT/OT, STR  · Case management for placement    Dementia with behavioral disturbance ([place] [address] )  Assessment & Plan  Currently at baseline  Delirium precautions    DVT (deep venous thrombosis) ([place] [address] )  Assessment & Plan  · Continue Eliquis    * Severe protein-calorie malnutrition ([place] [address] )  Assessment & Plan  Malnutrition Findings:   Adult Malnutrition type: Chronic illness  Adult Degree of Malnutrition: Other severe protein calorie malnutrition  Malnutrition Characteristics: Fat loss, Inadequate energy, Weight loss   360 Statement: Malnutrition related to dementia as evidenced by 14#(9%) weight loss from 5/14/# to 7/1/#, <75% energy intake compared to estimated needs >1 month, temporal wasting, sunken orbitals  BMI Findings: Body mass index is 24 02 kg/m²       · Nutrition recommendations appreciated  · Status post PEG tube placement 2022  · Was advanced to goal rate of 60 mL/hr on , tolerated well however, episode of vomiting this morning  Tube feed held at this time  · Patient was noted to have large amount of residual in her stomach when checked by RN, 300 mL  · Tube feed was held for several hours, residual was rechecked and was only 20 mL  · Reglan was added and tube feed was restated at 10 mL/hr  · Tube feed recommended by Nutrition  · Goal is: Jevity 1 2 at 60 mL/hr via PEG tube with 100 mL free water flush every 4 hours  · Speech following cleared for  · Puree/thin          VTE Pharmacologic Prophylaxis:   Pharmacologic: Apixaban (Eliquis)  Mechanical VTE Prophylaxis in Place: Yes    Patient Centered Rounds: I have performed bedside rounds with nursing staff today  Discussions with Specialists or Other Care Team Provider: cm, nursing    Education and Discussions with Family / Patient: pt    Time Spent for Care: 30 minutes  More than 50% of total time spent on counseling and coordination of care as described above  Current Length of Stay: 7 day(s)    Current Patient Status: Inpatient   Certification Statement: The patient will continue to require additional inpatient hospital stay due to see below    Discharge Plan:  Still requiring further evaluation of possible infection  Will need rehab ultimately    Code Status: Level 1 - Full Code      Subjective:   Denies chest pain, shortness breath, cough, fevers, chills    Objective:     Vitals:   Temp (24hrs), Av 9 °F (37 2 °C), Min:98 9 °F (37 2 °C), Max:98 9 °F (37 2 °C)    Temp:  [98 9 °F (37 2 °C)] 98 9 °F (37 2 °C)  HR:  [] 111  BP: ()/(58-62) 121/62  SpO2:  [95 %-99 %] 96 %  Body mass index is 24 02 kg/m²  Input and Output Summary (last 24 hours):        Intake/Output Summary (Last 24 hours) at 2022 0815  Last data filed at 2022 1901  Gross per 24 hour   Intake 200 ml   Output --   Net 200 ml       Physical Exam: Physical Exam  Constitutional:       General: She is not in acute distress  Appearance: She is well-developed  She is not diaphoretic  HENT:      Head: Normocephalic and atraumatic  Nose: Nose normal       Mouth/Throat:      Pharynx: No oropharyngeal exudate  Eyes:      General: No scleral icterus  Right eye: No discharge  Left eye: No discharge  Conjunctiva/sclera: Conjunctivae normal    Neck:      Thyroid: No thyromegaly  Vascular: No JVD  Cardiovascular:      Rate and Rhythm: Normal rate and regular rhythm  Heart sounds: Normal heart sounds  No murmur heard  No friction rub  No gallop  Pulmonary:      Effort: Pulmonary effort is normal  No respiratory distress  Breath sounds: Normal breath sounds  No wheezing or rales  Chest:      Chest wall: No tenderness  Abdominal:      General: Bowel sounds are normal  There is no distension  Palpations: Abdomen is soft  Tenderness: There is no abdominal tenderness  There is no guarding or rebound  Musculoskeletal:         General: No tenderness or deformity  Normal range of motion  Cervical back: Normal range of motion and neck supple  Skin:     General: Skin is warm and dry  Findings: No erythema or rash  Neurological:      Mental Status: She is alert  Mental status is at baseline  Cranial Nerves: No cranial nerve deficit  Sensory: No sensory deficit  Motor: No abnormal muscle tone        Coordination: Coordination normal          Additional Data:     Labs:    Results from last 7 days   Lab Units 07/08/22  0642   WBC Thousand/uL 10 53*   HEMOGLOBIN g/dL 10 3*   HEMATOCRIT % 28 5*   PLATELETS Thousands/uL 331     Results from last 7 days   Lab Units 07/08/22  0642 07/04/22  1611 07/04/22  0631   SODIUM mmol/L 138   < > 146*   POTASSIUM mmol/L 4 3   < > 3 4*   CHLORIDE mmol/L 103   < > 112*   CO2 mmol/L 30   < > 24   BUN mg/dL 19   < > 13   CREATININE mg/dL 0 50*   < > 0 55* ANION GAP mmol/L 5   < > 10   CALCIUM mg/dL 8 9   < > 8 5   ALBUMIN g/dL  --   --  1 8*   TOTAL BILIRUBIN mg/dL  --   --  0 95   ALK PHOS U/L  --   --  57   ALT U/L  --   --  9*   AST U/L  --   --  30   GLUCOSE RANDOM mg/dL 109   < > 93    < > = values in this interval not displayed  Results from last 7 days   Lab Units 07/08/22  0642 07/07/22  0905   PROCALCITONIN ng/ml 3 90* 8 47*           * I Have Reviewed All Lab Data Listed Above  * Additional Pertinent Lab Tests Reviewed: All Labs Within Last 24 Hours Reviewed    Imaging:    Imaging Reports Reviewed Today Include: na  Imaging Personally Reviewed by Myself Includes:  na    Recent Cultures (last 7 days):           Last 24 Hours Medication List:   Current Facility-Administered Medications   Medication Dose Route Frequency Provider Last Rate    acetaminophen  650 mg Oral Q6H PRN Romina Keane PA-C      apixaban  5 mg Per PEG Tube BID ArvJUDIE Sánchez      collagenase   Topical Daily JUDIE Jones      metoclopramide  10 mg Intravenous Q6H [address] JUDIE Orourke      multivitamin with iron-minerals  15 mL Oral Daily Ismael Chaudhary MD      piperacillin-tazobactam  3 375 g Intravenous Q6H JUDIE Jones Stopped (07/09/22 5783)    sodium hypochlorite   Irrigation Daily JUDIE Jones          Today, Patient Was Seen By: Lashell Kitchen MD    ** Please Note: Dictation voice to text software may have been used in the creation of this document   **

## 2022-07-09 NOTE — ASSESSMENT & PLAN NOTE
Malnutrition Findings:   Adult Malnutrition type: Chronic illness  Adult Degree of Malnutrition: Other severe protein calorie malnutrition  Malnutrition Characteristics: Fat loss, Inadequate energy, Weight loss   360 Statement: Malnutrition related to dementia as evidenced by 14#(9%) weight loss from 5/14/# to 7/1/#, <75% energy intake compared to estimated needs >1 month, temporal wasting, sunken orbitals  BMI Findings: Body mass index is 24 02 kg/m²  · Nutrition recommendations appreciated  · Status post PEG tube placement 07/02/2022  · Was advanced to goal rate of 60 mL/hr on 7/6, tolerated well however, episode of vomiting this morning  Tube feed held at this time    · Patient was noted to have large amount of residual in her stomach when checked by RN, 300 mL  · Tube feed was held for several hours, residual was rechecked and was only 20 mL  · Reglan was added and tube feed was restated at 10 mL/hr  · Tube feed recommended by Nutrition  · Goal is: Jevity 1 2 at 60 mL/hr via PEG tube with 100 mL free water flush every 4 hours  · Speech following cleared for  · Puree/thin

## 2022-07-09 NOTE — ASSESSMENT & PLAN NOTE
· Noted yesterday evidenced by tachycardia, leukocytosis  · Does have an elevated procalcitonin  · Unknown cause at this time    · Chest x-ray was completed to rule out aspiration; no sign of aspiration  · CT abdomen and right upper quadrant ultrasound revealed no acute infection  · Currently on IV Zosyn  · WBC count improving  · Will check UA

## 2022-07-10 LAB
ALBUMIN SERPL BCP-MCNC: 1.8 G/DL (ref 3.5–5)
ALP SERPL-CCNC: 78 U/L (ref 46–116)
ALT SERPL W P-5'-P-CCNC: 17 U/L (ref 12–78)
ANION GAP SERPL CALCULATED.3IONS-SCNC: 8 MMOL/L (ref 4–13)
AST SERPL W P-5'-P-CCNC: 48 U/L (ref 5–45)
BASOPHILS # BLD AUTO: 0.04 THOUSANDS/ΜL (ref 0–0.1)
BASOPHILS NFR BLD AUTO: 1 % (ref 0–1)
BILIRUB SERPL-MCNC: 0.85 MG/DL (ref 0.2–1)
BUN SERPL-MCNC: 15 MG/DL (ref 5–25)
CALCIUM ALBUM COR SERPL-MCNC: 10.5 MG/DL (ref 8.3–10.1)
CALCIUM SERPL-MCNC: 8.7 MG/DL (ref 8.3–10.1)
CHLORIDE SERPL-SCNC: 102 MMOL/L (ref 100–108)
CO2 SERPL-SCNC: 25 MMOL/L (ref 21–32)
CREAT SERPL-MCNC: 0.59 MG/DL (ref 0.6–1.3)
EOSINOPHIL # BLD AUTO: 0.23 THOUSAND/ΜL (ref 0–0.61)
EOSINOPHIL NFR BLD AUTO: 3 % (ref 0–6)
ERYTHROCYTE [DISTWIDTH] IN BLOOD BY AUTOMATED COUNT: 17.3 % (ref 11.6–15.1)
GFR SERPL CREATININE-BSD FRML MDRD: 83 ML/MIN/1.73SQ M
GLUCOSE SERPL-MCNC: 101 MG/DL (ref 65–140)
HCT VFR BLD AUTO: 31.6 % (ref 34.8–46.1)
HGB BLD-MCNC: 10.2 G/DL (ref 11.5–15.4)
IMM GRANULOCYTES # BLD AUTO: 0.07 THOUSAND/UL (ref 0–0.2)
IMM GRANULOCYTES NFR BLD AUTO: 1 % (ref 0–2)
LYMPHOCYTES # BLD AUTO: 1.9 THOUSANDS/ΜL (ref 0.6–4.47)
LYMPHOCYTES NFR BLD AUTO: 25 % (ref 14–44)
MCH RBC QN AUTO: 31.8 PG (ref 26.8–34.3)
MCHC RBC AUTO-ENTMCNC: 32.3 G/DL (ref 31.4–37.4)
MCV RBC AUTO: 98 FL (ref 82–98)
MONOCYTES # BLD AUTO: 0.95 THOUSAND/ΜL (ref 0.17–1.22)
MONOCYTES NFR BLD AUTO: 12 % (ref 4–12)
NEUTROPHILS # BLD AUTO: 4.45 THOUSANDS/ΜL (ref 1.85–7.62)
NEUTS SEG NFR BLD AUTO: 58 % (ref 43–75)
NRBC BLD AUTO-RTO: 0 /100 WBCS
PLATELET # BLD AUTO: 353 THOUSANDS/UL (ref 149–390)
PMV BLD AUTO: 9 FL (ref 8.9–12.7)
POTASSIUM SERPL-SCNC: 3.1 MMOL/L (ref 3.5–5.3)
PROT SERPL-MCNC: 5.7 G/DL (ref 6.4–8.2)
RBC # BLD AUTO: 3.21 MILLION/UL (ref 3.81–5.12)
SODIUM SERPL-SCNC: 135 MMOL/L (ref 136–145)
WBC # BLD AUTO: 7.64 THOUSAND/UL (ref 4.31–10.16)

## 2022-07-10 PROCEDURE — 80053 COMPREHEN METABOLIC PANEL: CPT | Performed by: INTERNAL MEDICINE

## 2022-07-10 PROCEDURE — 99232 SBSQ HOSP IP/OBS MODERATE 35: CPT | Performed by: INTERNAL MEDICINE

## 2022-07-10 PROCEDURE — 85025 COMPLETE CBC W/AUTO DIFF WBC: CPT | Performed by: INTERNAL MEDICINE

## 2022-07-10 RX ORDER — POTASSIUM CHLORIDE 14.9 MG/ML
20 INJECTION INTRAVENOUS ONCE
Status: COMPLETED | OUTPATIENT
Start: 2022-07-10 | End: 2022-07-11

## 2022-07-10 RX ORDER — ONDANSETRON 2 MG/ML
4 INJECTION INTRAMUSCULAR; INTRAVENOUS ONCE
Status: COMPLETED | OUTPATIENT
Start: 2022-07-10 | End: 2022-07-10

## 2022-07-10 RX ADMIN — APIXABAN 5 MG: 5 TABLET, FILM COATED ORAL at 17:21

## 2022-07-10 RX ADMIN — ACETAMINOPHEN 650 MG: 325 TABLET, FILM COATED ORAL at 20:27

## 2022-07-10 RX ADMIN — ONDANSETRON 4 MG: 2 INJECTION INTRAMUSCULAR; INTRAVENOUS at 20:27

## 2022-07-10 RX ADMIN — SENNOSIDES A AND B 15 ML: 8.8 SYRUP ORAL at 09:09

## 2022-07-10 RX ADMIN — METOCLOPRAMIDE HYDROCHLORIDE 10 MG: 5 INJECTION INTRAMUSCULAR; INTRAVENOUS at 05:00

## 2022-07-10 RX ADMIN — APIXABAN 5 MG: 5 TABLET, FILM COATED ORAL at 09:09

## 2022-07-10 RX ADMIN — PIPERACILLIN AND TAZOBACTAM 3.38 G: 36; 4.5 INJECTION, POWDER, FOR SOLUTION INTRAVENOUS at 04:59

## 2022-07-10 RX ADMIN — COLLAGENASE SANTYL: 250 OINTMENT TOPICAL at 09:08

## 2022-07-10 RX ADMIN — METOCLOPRAMIDE HYDROCHLORIDE 10 MG: 5 INJECTION INTRAMUSCULAR; INTRAVENOUS at 12:44

## 2022-07-10 RX ADMIN — POTASSIUM CHLORIDE 20 MEQ: 14.9 INJECTION, SOLUTION INTRAVENOUS at 09:22

## 2022-07-10 RX ADMIN — DEXTROSE MONOHYDRATE 1000 MG: 5 INJECTION, SOLUTION INTRAVENOUS at 14:26

## 2022-07-10 RX ADMIN — DAKIN'S SOLUTION 0.125% (QUARTER STRENGTH): 0.12 SOLUTION at 09:09

## 2022-07-10 RX ADMIN — METOCLOPRAMIDE HYDROCHLORIDE 10 MG: 5 INJECTION INTRAMUSCULAR; INTRAVENOUS at 17:20

## 2022-07-10 NOTE — PLAN OF CARE
Problem: Prexisting or High Potential for Compromised Skin Integrity  Goal: Skin integrity is maintained or improved  Description: INTERVENTIONS:  - Identify patients at risk for skin breakdown  - Assess and monitor skin integrity  - Assess and monitor nutrition and hydration status  - Monitor labs   - Assess for incontinence   - Turn and reposition patient  - Assist with mobility/ambulation  - Relieve pressure over bony prominences  - Avoid friction and shearing  - Provide appropriate hygiene as needed including keeping skin clean and dry  - Evaluate need for skin moisturizer/barrier cream  - Collaborate with interdisciplinary team   - Patient/family teaching  - Consider wound care consult   Outcome: Progressing     Problem: GASTROINTESTINAL - ADULT  Goal: Minimal or absence of nausea and/or vomiting  Description: INTERVENTIONS:  - Administer IV fluids if ordered to ensure adequate hydration  - Maintain NPO status until nausea and vomiting are resolved  - Nasogastric tube if ordered  - Administer ordered antiemetic medications as needed  - Provide nonpharmacologic comfort measures as appropriate  - Advance diet as tolerated, if ordered  - Consider nutrition services referral to assist patient with adequate nutrition and appropriate food choices  Outcome: Progressing  Goal: Maintains or returns to baseline bowel function  Description: INTERVENTIONS:  - Assess bowel function  - Encourage oral fluids to ensure adequate hydration  - Administer IV fluids if ordered to ensure adequate hydration  - Administer ordered medications as needed  - Encourage mobilization and activity  - Consider nutritional services referral to assist patient with adequate nutrition and appropriate food choices  Outcome: Progressing  Goal: Maintains adequate nutritional intake  Description: INTERVENTIONS:  - Monitor percentage of each meal consumed  - Identify factors contributing to decreased intake, treat as appropriate  - Assist with meals as needed  - Monitor I&O, weight, and lab values if indicated  - Obtain nutrition services referral as needed  Outcome: Progressing  Goal: Establish and maintain optimal ostomy function  Description: INTERVENTIONS:  - Assess bowel function  - Encourage oral fluids to ensure adequate hydration  - Administer IV fluids if ordered to ensure adequate hydration   - Administer ordered medications as needed  - Encourage mobilization and activity  - Nutrition services referral to assist patient with appropriate food choices  - Assess stoma site  - Consider wound care consult   Outcome: Progressing  Goal: Oral mucous membranes remain intact  Description: INTERVENTIONS  - Assess oral mucosa and hygiene practices  - Implement preventative oral hygiene regimen  - Implement oral medicated treatments as ordered  - Initiate Nutrition services referral as needed  Outcome: Progressing     Problem: SKIN/TISSUE INTEGRITY - ADULT  Goal: Skin Integrity remains intact(Skin Breakdown Prevention)  Description: Assess:  -Perform Saúl assessment every shift  -Clean and moisturize skin every shift  -Inspect skin when repositioning, toileting, and assisting with ADLS  -Assess under medical devices such as PEG every shift  -Assess extremities for adequate circulation and sensation     Bed Management:  -Have minimal linens on bed & keep smooth, unwrinkled  -Change linens as needed when moist or perspiring  -Avoid sitting or lying in one position for more than 2  hours while in bed  -Keep HOB at 45 degrees     Toileting:    -Assess for incontinence every 2 hours      Activity:    -Encourage or provide ROM exercises   -Turn and reposition patient every 2 Hours  -Use appropriate equipment to lift or move patient in bed        Skin Care:  -Avoid use of baby powder, tape, friction and shearing, hot water or constrictive clothing  -Relieve pressure over bony prominences using Allevyn foam dressing  -Do not massage red bony areas    Outcome: Progressing  Goal: Incision(s), wounds(s) or drain site(s) healing without S/S of infection  Description: INTERVENTIONS  - Assess and document dressing, incision, wound bed, drain sites and surrounding tissue  - Provide patient and family education  - Perform skin care/dressing changes every shift  Outcome: Progressing  Goal: Pressure injury heals and does not worsen  Description: Interventions:  - Implement low air loss mattress or specialty surface (Criteria met)  - Apply silicone foam dressing  - Apply fecal or urinary incontinence containment device   - Turn and reposition patient & offload bony prominences every 2 hours   - Utilize friction reducing device or surface for transfers   - Use incontinent care products after each incontinent episode such as wipes  - Consider nutrition services referral as needed  Outcome: Progressing     Problem: MUSCULOSKELETAL - ADULT  Goal: Maintain or return mobility to safest level of function  Description: INTERVENTIONS:  - Assess patient's ability to carry out ADLs; assess patient's baseline for ADL function and identify physical deficits which impact ability to perform ADLs (bathing, care of mouth/teeth, toileting, grooming, dressing, etc )  - Assess/evaluate cause of self-care deficits   - Assess range of motion  - Assess patient's mobility  - Assess patient's need for assistive devices and provide as appropriate  - Encourage maximum independence but intervene and supervise when necessary  - Involve family in performance of ADLs  - Assess for home care needs following discharge   - Consider OT consult to assist with ADL evaluation and planning for discharge  - Provide patient education as appropriate  Outcome: Progressing  Goal: Maintain proper alignment of affected body part  Description: INTERVENTIONS:  - Support, maintain and protect limb and body alignment  - Provide patient/ family with appropriate education  Outcome: Progressing     Problem: SAFETY ADULT  Goal: Patient will remain free of falls  Description: INTERVENTIONS:  - Educate patient/family on patient safety including physical limitations  - Instruct patient to call for assistance with activity   - Consult OT/PT to assist with strengthening/mobility   - Keep Call bell within reach  - Keep bed low and locked with side rails adjusted as appropriate  - Keep care items and personal belongings within reach  - Initiate and maintain comfort rounds  - Make Fall Risk Sign visible to staff  - Initiate/Maintain bed alarm  - Obtain necessary fall risk management equipment:   - Apply yellow socks and bracelet for high fall risk patients  - Consider moving patient to room near nurses station  Outcome: Progressing  Goal: Maintain or return to baseline ADL function  Description: INTERVENTIONS:  -  Assess patient's ability to carry out ADLs; assess patient's baseline for ADL function and identify physical deficits which impact ability to perform ADLs (bathing, care of mouth/teeth, toileting, grooming, dressing, etc )  - Assess/evaluate cause of self-care deficits   - Assess range of motion  - Assess patient's mobility; develop plan if impaired  - Assess patient's need for assistive devices and provide as appropriate  - Encourage maximum independence but intervene and supervise when necessary  - Involve family in performance of ADLs  - Assess for home care needs following discharge   - Consider OT consult to assist with ADL evaluation and planning for discharge  - Provide patient education as appropriate  Outcome: Progressing  Goal: Maintains/Returns to pre admission functional level  Description: INTERVENTIONS:  - Perform BMAT or MOVE assessment daily    - Set and communicate daily mobility goal to care team and patient/family/caregiver  - Collaborate with rehabilitation services on mobility goals if consulted  - Reposition patient every 2 hours    - Record patient progress and toleration of activity level   Outcome: Progressing Problem: DISCHARGE PLANNING  Goal: Discharge to home or other facility with appropriate resources  Description: INTERVENTIONS:  - Identify barriers to discharge w/patient and caregiver  - Arrange for needed discharge resources and transportation as appropriate  - Identify discharge learning needs (meds, wound care, etc )  - Arrange for interpretive services to assist at discharge as needed  - Refer to Case Management Department for coordinating discharge planning if the patient needs post-hospital services based on physician/advanced practitioner order or complex needs related to functional status, cognitive ability, or social support system  Outcome: Progressing     Problem: Nutrition/Hydration-ADULT  Goal: Nutrient/Hydration intake appropriate for improving, restoring or maintaining nutritional needs  Description: Monitor and assess patient's nutrition/hydration status for malnutrition  Collaborate with interdisciplinary team and initiate plan and interventions as ordered  Monitor patient's weight and dietary intake as ordered or per policy  Utilize nutrition screening tool and intervene as necessary     INTERVENTIONS:  - Monitor intake, urinary output, labs, and treatment plans  - Assess nutrition and hydration status and recommend course of action  - Recommend/ encourage appropriate diets, oral nutritional supplements, and vitamin/mineral supplements  - Order, calculate, and assess calorie counts as needed  - Recommend, monitor, and adjust tube feedings based on assessed needs  - Assess need for intravenous fluids  - Provide nutrition/hydration education as appropriate  - Include patient/family/caregiver in decisions related to nutrition  Outcome: Progressing

## 2022-07-10 NOTE — PROGRESS NOTES
[address] [facility]  Progress Note - Richa Landers 1935, 81 y o  female MRN: [id number]  Unit/Bed#: -01 Encounter: [id number]  Primary Care Provider: Olivier Tucker   Date and time admitted to hospital: 7/1/2022  4:10 PM    * Severe protein-calorie malnutrition ([facility] 75 )  Assessment & Plan  Malnutrition Findings:   Adult Malnutrition type: Chronic illness  Adult Degree of Malnutrition: Other severe protein calorie malnutrition  Malnutrition Characteristics: Fat loss, Inadequate energy, Weight loss   360 Statement: Malnutrition related to dementia as evidenced by 14#(9%) weight loss from 5/14/# to 7/1/#, <75% energy intake compared to estimated needs >1 month, temporal wasting, sunken orbitals  BMI Findings: Body mass index is 24 02 kg/m²  · Nutrition recommendations appreciated  · Status post PEG tube placement 07/02/2022  · Was advanced to goal rate of 60 mL/hr on 7/6, tolerated well however, episode of vomiting this morning  Tube feed held at this time  · Patient was noted to have large amount of residual in her stomach when checked by RN, 300 mL  · Tube feed was held for several hours, residual was rechecked and was only 20 mL  · Reglan was added and tube feed was restated at 10 mL/hr  · Tube feed recommended by Nutrition  · Goal is: Jevity 1 2 at 60 mL/hr via PEG tube with 100 mL free water flush every 4 hours  · Speech following cleared for  · Puree/thin        SIRS (systemic inflammatory response syndrome) ([place] [id number] )  Assessment & Plan  Noted yesterday evidenced by tachycardia, leukocytosis  Does have an elevated procalcitonin  Unknown cause at this time    Chest x-ray was completed to rule out aspiration; no sign of aspiration  CT abdomen and right upper quadrant ultrasound revealed no acute infection  Urinalysis was never collected  Clinically improved, leukocytosis has since resolved  Will continue ceftriaxone for now    Stage IV pressure ulcer ([facility] 75 )  Assessment & Plan  · Stage 4 Pressure Injury of Sacrum, POA, in setting of advanced dementia evidenced by wound care documentation on 22 treated with Dakin's rinse/ Santyl, Ehob cushion when out of bed, low air loss mattress and turn/reposition      Generalized weakness  Assessment & Plan  Patient with generalized weakness and according to son, not pleased with patient's care at her prior nursing facility  PT/OT, STR  Case management for placement    Dementia with behavioral disturbance [facility])  Assessment & Plan  Currently at baseline  Delirium precautions    DVT (deep venous thrombosis) ([facility] )  Assessment & Plan  · C/w eliquis      VTE Pharmacologic Prophylaxis:   Pharmacologic: Apixaban (Eliquis)  Mechanical VTE Prophylaxis in Place: Yes    Patient Centered Rounds: I have performed bedside rounds with nursing staff today  Discussions with Specialists or Other Care Team Provider: cm, nursing    Education and Discussions with Family / Patient: pt    Time Spent for Care: 30 minutes  More than 50% of total time spent on counseling and coordination of care as described above  Current Length of Stay: 8 day(s)    Current Patient Status: Inpatient   Certification Statement: The patient will continue to require additional inpatient hospital stay due to see below    Discharge Plan:  Medically clear awaiting placement to rehab    Code Status: Level 1 - Full Code      Subjective:   Denies chest pain, shortness breast, cough, fevers, chills    Objective:     Vitals:   Temp (24hrs), Av 5 °F (36 9 °C), Min:98 3 °F (36 8 °C), Max:98 7 °F (37 1 °C)    Temp:  [98 3 °F (36 8 °C)-98 7 °F (37 1 °C)] 98 7 °F (37 1 °C)  HR:  [] 101  Resp:  [17] 17  BP: (125-146)/(68-95) 146/95  SpO2:  [95 %-99 %] 97 %  Body mass index is 24 02 kg/m²  Input and Output Summary (last 24 hours):        Intake/Output Summary (Last 24 hours) at 7/10/2022 0904  Last data filed at 2022 1000  Gross per 24 hour   Intake 100 ml   Output 0 ml   Net 100 ml       Physical Exam:     Physical Exam  Constitutional:       General: She is not in acute distress  Appearance: She is well-developed  She is not diaphoretic  HENT:      Head: Normocephalic and atraumatic  Nose: Nose normal       Mouth/Throat:      Pharynx: No oropharyngeal exudate  Eyes:      General: No scleral icterus  Right eye: No discharge  Left eye: No discharge  Conjunctiva/sclera: Conjunctivae normal    Neck:      Thyroid: No thyromegaly  Vascular: No JVD  Cardiovascular:      Rate and Rhythm: Normal rate and regular rhythm  Heart sounds: Normal heart sounds  No murmur heard  No friction rub  No gallop  Pulmonary:      Effort: Pulmonary effort is normal  No respiratory distress  Breath sounds: Normal breath sounds  No wheezing or rales  Chest:      Chest wall: No tenderness  Abdominal:      General: Bowel sounds are normal  There is no distension  Palpations: Abdomen is soft  Tenderness: There is no abdominal tenderness  There is no guarding or rebound  Musculoskeletal:         General: No tenderness or deformity  Normal range of motion  Cervical back: Normal range of motion and neck supple  Skin:     General: Skin is warm and dry  Findings: No erythema or rash  Neurological:      Mental Status: She is alert  Mental status is at baseline  Cranial Nerves: No cranial nerve deficit  Sensory: No sensory deficit  Motor: No abnormal muscle tone        Coordination: Coordination normal            Additional Data:     Labs:    Results from last 7 days   Lab Units 07/10/22  0547   WBC Thousand/uL 7 64   HEMOGLOBIN g/dL 10 2*   HEMATOCRIT % 31 6*   PLATELETS Thousands/uL 353   NEUTROS PCT % 58   LYMPHS PCT % 25   MONOS PCT % 12   EOS PCT % 3     Results from last 7 days   Lab Units 07/10/22  0547   SODIUM mmol/L 135*   POTASSIUM mmol/L 3 1*   CHLORIDE mmol/L 102   CO2 mmol/L 25   BUN mg/dL 15   CREATININE mg/dL 0 59*   ANION GAP mmol/L 8   CALCIUM mg/dL 8 7   ALBUMIN g/dL 1 8*   TOTAL BILIRUBIN mg/dL 0 85   ALK PHOS U/L 78   ALT U/L 17   AST U/L 48*   GLUCOSE RANDOM mg/dL 101                 Results from last 7 days   Lab Units 07/08/22  0642 07/07/22  0905   PROCALCITONIN ng/ml 3 90* 8 47*           * I Have Reviewed All Lab Data Listed Above  * Additional Pertinent Lab Tests Reviewed: All Labs Within Last 24 Hours Reviewed    Imaging:    Imaging Reports Reviewed Today Include: na  Imaging Personally Reviewed by Myself Includes:  na    Recent Cultures (last 7 days):           Last 24 Hours Medication List:   Current Facility-Administered Medications   Medication Dose Route Frequency Provider Last Rate    acetaminophen  650 mg Oral Q6H PRN Gerardo Grissom PA-C      apixaban  5 mg Per PEG Tube BID JUDIE Arriaza      cefTRIAXone  1,000 mg Intravenous Q24H Ismael Chaudhary MD      collagenase   Topical Daily JUDIE Arriaza      metoclopramide  10 mg Intravenous Q6H [facility] JUDIE Orourke      multivitamin with iron-minerals  15 mL Oral Daily Ismael Chaudhary MD      potassium chloride  20 mEq Intravenous Once Nuria Worthy MD      sodium hypochlorite   Irrigation Daily JUDIE Arriaza          Today, Patient Was Seen By: Nuria Worthy MD    ** Please Note: Dictation voice to text software may have been used in the creation of this document   **

## 2022-07-10 NOTE — ASSESSMENT & PLAN NOTE
Patient with generalized weakness and according to son, not pleased with patient's care at her prior nursing facility  PT/OT, STR  Case management for placement

## 2022-07-10 NOTE — DISCHARGE SUMMARY
[address] [facility]  Discharge- Viraj Abreu 1935, 81 y o  female MRN: [id number]  Unit/Bed#: -01 Encounter: [id number]  Primary Care Provider: Opal Oviedo   Date and time admitted to hospital: 7/1/2022  4:10 PM    * Severe protein-calorie malnutrition ([facility] 75 )  Assessment & Plan  Malnutrition Findings:   Adult Malnutrition type: Chronic illness  Adult Degree of Malnutrition: Other severe protein calorie malnutrition  Malnutrition Characteristics: Fat loss, Inadequate energy, Weight loss   360 Statement: Malnutrition related to dementia as evidenced by 14#(9%) weight loss from 5/14/# to 7/1/#, <75% energy intake compared to estimated needs >1 month, temporal wasting, sunken orbitals  BMI Findings: Body mass index is 24 02 kg/m²  · Nutrition recommendations appreciated  · Status post PEG tube placement 07/02/2022  · Was advanced to goal rate of 60 mL/hr on 7/6, tolerated well however, episode of vomiting this morning  Tube feed held at this time  · Patient was noted to have large amount of residual in her stomach when checked by RN, 300 mL  · Tube feed was held for several hours, residual was rechecked and was only 20 mL  · Reglan was added and tube feed was restated at 10 mL/hr  · Tube feed recommended by Nutrition  · Goal is: Jevity 1 2 at 60 mL/hr via PEG tube with 100 mL free water flush every 4 hours  · Speech following cleared for  · Puree/thin        SIRS (systemic inflammatory response syndrome) ([facility] 75 )  Assessment & Plan  Noted yesterday evidenced by tachycardia, leukocytosis  Does have an elevated procalcitonin  Unknown cause at this time    Chest x-ray was completed to rule out aspiration; no sign of aspiration  CT abdomen and right upper quadrant ultrasound revealed no acute infection  Urinalysis was never collected  Clinically improved, leukocytosis has since resolved  Will continue ceftriaxone for now    Stage IV pressure ulcer ([facility] 75 )  Assessment & Plan  · Stage 4 Pressure Injury of Sacrum, POA, in setting of advanced dementia evidenced by wound care documentation on 22 treated with Dakin's rinse/ Santyl, Ehob cushion when out of bed, low air loss mattress and turn/reposition      Generalized weakness  Assessment & Plan  Patient with generalized weakness and according to son, not pleased with patient's care at her prior nursing facility  PT/OT, STR  Case management for placement    Dementia with behavioral disturbance [facility])  Assessment & Plan  Currently at baseline  Delirium precautions    DVT (deep venous thrombosis) ([facility] )  Assessment & Plan  · C/w eliquis      VTE Pharmacologic Prophylaxis:   Pharmacologic: Apixaban (Eliquis)  Mechanical VTE Prophylaxis in Place: Yes    Patient Centered Rounds: I have performed bedside rounds with nursing staff today  Discussions with Specialists or Other Care Team Provider: cm, nursing    Education and Discussions with Family / Patient: pt    Time Spent for Care: 30 minutes  More than 50% of total time spent on counseling and coordination of care as described above  Current Length of Stay: 8 day(s)    Current Patient Status: Inpatient   Certification Statement: The patient will continue to require additional inpatient hospital stay due to see below    Discharge Plan:  Medically clear awaiting placement to rehab    Code Status: Level 1 - Full Code      Subjective:   Denies chest pain, shortness breast, cough, fevers, chills    Objective:     Vitals:   Temp (24hrs), Av 5 °F (36 9 °C), Min:98 3 °F (36 8 °C), Max:98 7 °F (37 1 °C)    Temp:  [98 3 °F (36 8 °C)-98 7 °F (37 1 °C)] 98 7 °F (37 1 °C)  HR:  [] 101  Resp:  [17] 17  BP: (125-146)/(68-95) 146/95  SpO2:  [95 %-99 %] 97 %  Body mass index is 24 02 kg/m²  Input and Output Summary (last 24 hours):        Intake/Output Summary (Last 24 hours) at 7/10/2022 0904  Last data filed at 2022 1000  Gross per 24 hour   Intake 100 ml   Output 0 ml   Net 100 ml       Physical Exam:     Physical Exam  Constitutional:       General: She is not in acute distress  Appearance: She is well-developed  She is not diaphoretic  HENT:      Head: Normocephalic and atraumatic  Nose: Nose normal       Mouth/Throat:      Pharynx: No oropharyngeal exudate  Eyes:      General: No scleral icterus  Right eye: No discharge  Left eye: No discharge  Conjunctiva/sclera: Conjunctivae normal    Neck:      Thyroid: No thyromegaly  Vascular: No JVD  Cardiovascular:      Rate and Rhythm: Normal rate and regular rhythm  Heart sounds: Normal heart sounds  No murmur heard  No friction rub  No gallop  Pulmonary:      Effort: Pulmonary effort is normal  No respiratory distress  Breath sounds: Normal breath sounds  No wheezing or rales  Chest:      Chest wall: No tenderness  Abdominal:      General: Bowel sounds are normal  There is no distension  Palpations: Abdomen is soft  Tenderness: There is no abdominal tenderness  There is no guarding or rebound  Musculoskeletal:         General: No tenderness or deformity  Normal range of motion  Cervical back: Normal range of motion and neck supple  Skin:     General: Skin is warm and dry  Findings: No erythema or rash  Neurological:      Mental Status: She is alert  Mental status is at baseline  Cranial Nerves: No cranial nerve deficit  Sensory: No sensory deficit  Motor: No abnormal muscle tone        Coordination: Coordination normal            Additional Data:     Labs:    Results from last 7 days   Lab Units 07/10/22  0547   WBC Thousand/uL 7 64   HEMOGLOBIN g/dL 10 2*   HEMATOCRIT % 31 6*   PLATELETS Thousands/uL 353   NEUTROS PCT % 58   LYMPHS PCT % 25   MONOS PCT % 12   EOS PCT % 3     Results from last 7 days   Lab Units 07/10/22  0547   SODIUM mmol/L 135*   POTASSIUM mmol/L 3 1*   CHLORIDE mmol/L 102   CO2 mmol/L 25   BUN mg/dL 15   CREATININE mg/dL 0 59*   ANION GAP mmol/L 8   CALCIUM mg/dL 8 7   ALBUMIN g/dL 1 8*   TOTAL BILIRUBIN mg/dL 0 85   ALK PHOS U/L 78   ALT U/L 17   AST U/L 48*   GLUCOSE RANDOM mg/dL 101                 Results from last 7 days   Lab Units 07/08/22  0642 07/07/22  0905   PROCALCITONIN ng/ml 3 90* 8 47*           * I Have Reviewed All Lab Data Listed Above  * Additional Pertinent Lab Tests Reviewed: All Labs Within Last 24 Hours Reviewed    Imaging:    Imaging Reports Reviewed Today Include: na  Imaging Personally Reviewed by Myself Includes:  na    Recent Cultures (last 7 days):           Last 24 Hours Medication List:   Current Facility-Administered Medications   Medication Dose Route Frequency Provider Last Rate    acetaminophen  650 mg Oral Q6H JERRY Gabriel PA-C      apixaban  5 mg Per PEG Tube BID Randa Safe, CRNP      cefTRIAXone  1,000 mg Intravenous Q24H Ismael Chuadhary MD      collagenase   Topical Daily Randa JUDIE Garcia      metoclopramide  10 mg Intravenous Q6H [address] JUDIE Orourke      multivitamin with iron-minerals  15 mL Oral Daily Ismael Chaudhary MD      potassium chloride  20 mEq Intravenous Once Rafal Lombardo MD      sodium hypochlorite   Irrigation Daily JUDIE Guy          Today, Patient Was Seen By: Rafal Lombardo MD    ** Please Note: Dictation voice to text software may have been used in the creation of this document   **

## 2022-07-10 NOTE — ASSESSMENT & PLAN NOTE
Noted yesterday evidenced by tachycardia, leukocytosis  Does have an elevated procalcitonin  Unknown cause at this time    Chest x-ray was completed to rule out aspiration; no sign of aspiration  CT abdomen and right upper quadrant ultrasound revealed no acute infection  Urinalysis was never collected  Clinically improved, leukocytosis has since resolved  Will continue ceftriaxone for now

## 2022-07-11 VITALS
HEART RATE: 105 BPM | OXYGEN SATURATION: 98 % | RESPIRATION RATE: 18 BRPM | WEIGHT: 148.81 LBS | HEIGHT: 66 IN | DIASTOLIC BLOOD PRESSURE: 71 MMHG | TEMPERATURE: 98.1 F | SYSTOLIC BLOOD PRESSURE: 131 MMHG | BODY MASS INDEX: 23.92 KG/M2

## 2022-07-11 LAB
ALBUMIN SERPL BCP-MCNC: 1.7 G/DL (ref 3.5–5)
ALP SERPL-CCNC: 60 U/L (ref 46–116)
ALT SERPL W P-5'-P-CCNC: 16 U/L (ref 12–78)
ANION GAP SERPL CALCULATED.3IONS-SCNC: 6 MMOL/L (ref 4–13)
AST SERPL W P-5'-P-CCNC: 37 U/L (ref 5–45)
BASOPHILS # BLD AUTO: 0.02 THOUSANDS/ΜL (ref 0–0.1)
BASOPHILS NFR BLD AUTO: 0 % (ref 0–1)
BILIRUB SERPL-MCNC: 0.47 MG/DL (ref 0.2–1)
BUN SERPL-MCNC: 13 MG/DL (ref 5–25)
CALCIUM ALBUM COR SERPL-MCNC: 10.3 MG/DL (ref 8.3–10.1)
CALCIUM SERPL-MCNC: 8.5 MG/DL (ref 8.3–10.1)
CHLORIDE SERPL-SCNC: 103 MMOL/L (ref 100–108)
CO2 SERPL-SCNC: 27 MMOL/L (ref 21–32)
CREAT SERPL-MCNC: 0.52 MG/DL (ref 0.6–1.3)
EOSINOPHIL # BLD AUTO: 0.26 THOUSAND/ΜL (ref 0–0.61)
EOSINOPHIL NFR BLD AUTO: 3 % (ref 0–6)
ERYTHROCYTE [DISTWIDTH] IN BLOOD BY AUTOMATED COUNT: 17.2 % (ref 11.6–15.1)
FLUAV RNA RESP QL NAA+PROBE: NEGATIVE
FLUBV RNA RESP QL NAA+PROBE: NEGATIVE
GFR SERPL CREATININE-BSD FRML MDRD: 87 ML/MIN/1.73SQ M
GLUCOSE SERPL-MCNC: 107 MG/DL (ref 65–140)
HCT VFR BLD AUTO: 28.1 % (ref 34.8–46.1)
HGB BLD-MCNC: 9.3 G/DL (ref 11.5–15.4)
IMM GRANULOCYTES # BLD AUTO: 0.09 THOUSAND/UL (ref 0–0.2)
IMM GRANULOCYTES NFR BLD AUTO: 1 % (ref 0–2)
LYMPHOCYTES # BLD AUTO: 1.61 THOUSANDS/ΜL (ref 0.6–4.47)
LYMPHOCYTES NFR BLD AUTO: 19 % (ref 14–44)
MAGNESIUM SERPL-MCNC: 1.6 MG/DL (ref 1.6–2.6)
MCH RBC QN AUTO: 32.5 PG (ref 26.8–34.3)
MCHC RBC AUTO-ENTMCNC: 33.1 G/DL (ref 31.4–37.4)
MCV RBC AUTO: 98 FL (ref 82–98)
MONOCYTES # BLD AUTO: 0.95 THOUSAND/ΜL (ref 0.17–1.22)
MONOCYTES NFR BLD AUTO: 11 % (ref 4–12)
NEUTROPHILS # BLD AUTO: 5.41 THOUSANDS/ΜL (ref 1.85–7.62)
NEUTS SEG NFR BLD AUTO: 66 % (ref 43–75)
NRBC BLD AUTO-RTO: 0 /100 WBCS
PLATELET # BLD AUTO: 363 THOUSANDS/UL (ref 149–390)
PMV BLD AUTO: 9 FL (ref 8.9–12.7)
POTASSIUM SERPL-SCNC: 3.5 MMOL/L (ref 3.5–5.3)
PROCALCITONIN SERPL-MCNC: 0.31 NG/ML
PROT SERPL-MCNC: 5.3 G/DL (ref 6.4–8.2)
RBC # BLD AUTO: 2.86 MILLION/UL (ref 3.81–5.12)
RSV RNA RESP QL NAA+PROBE: NEGATIVE
SARS-COV-2 RNA RESP QL NAA+PROBE: NEGATIVE
SODIUM SERPL-SCNC: 136 MMOL/L (ref 136–145)
WBC # BLD AUTO: 8.34 THOUSAND/UL (ref 4.31–10.16)

## 2022-07-11 PROCEDURE — 0241U HB NFCT DS VIR RESP RNA 4 TRGT: CPT | Performed by: INTERNAL MEDICINE

## 2022-07-11 PROCEDURE — NC001 PR NO CHARGE: Performed by: INTERNAL MEDICINE

## 2022-07-11 PROCEDURE — 84145 PROCALCITONIN (PCT): CPT | Performed by: NURSE PRACTITIONER

## 2022-07-11 PROCEDURE — 80053 COMPREHEN METABOLIC PANEL: CPT | Performed by: INTERNAL MEDICINE

## 2022-07-11 PROCEDURE — 85025 COMPLETE CBC W/AUTO DIFF WBC: CPT | Performed by: INTERNAL MEDICINE

## 2022-07-11 PROCEDURE — 83735 ASSAY OF MAGNESIUM: CPT | Performed by: NURSE PRACTITIONER

## 2022-07-11 PROCEDURE — 99238 HOSP IP/OBS DSCHRG MGMT 30/<: CPT | Performed by: INTERNAL MEDICINE

## 2022-07-11 RX ORDER — CEFPODOXIME PROXETIL 200 MG/1
200 TABLET, FILM COATED ORAL 2 TIMES DAILY
Qty: 8 TABLET | Refills: 0 | Status: SHIPPED | OUTPATIENT
Start: 2022-07-11 | End: 2022-07-17 | Stop reason: ALTCHOICE

## 2022-07-11 RX ADMIN — SENNOSIDES A AND B 15 ML: 8.8 SYRUP ORAL at 09:53

## 2022-07-11 RX ADMIN — DAKIN'S SOLUTION 0.125% (QUARTER STRENGTH): 0.12 SOLUTION at 09:44

## 2022-07-11 RX ADMIN — METOCLOPRAMIDE HYDROCHLORIDE 10 MG: 5 INJECTION INTRAMUSCULAR; INTRAVENOUS at 06:12

## 2022-07-11 RX ADMIN — APIXABAN 5 MG: 5 TABLET, FILM COATED ORAL at 09:46

## 2022-07-11 RX ADMIN — COLLAGENASE SANTYL: 250 OINTMENT TOPICAL at 09:43

## 2022-07-11 NOTE — PLAN OF CARE
Problem: Potential for Falls  Goal: Patient will remain free of falls  Description: INTERVENTIONS:  - Educate patient/family on patient safety including physical limitations  - Instruct patient to call for assistance with activity   - Consult OT/PT to assist with strengthening/mobility   - Keep Call bell within reach  - Keep bed low and locked with side rails adjusted as appropriate  - Keep care items and personal belongings within reach  - Initiate and maintain comfort rounds  - Make Fall Risk Sign visible to staff  - Apply yellow socks and bracelet for high fall risk patients  - Consider moving patient to room near nurses station  Outcome: Progressing     Problem: MOBILITY - ADULT  Goal: Maintain or return to baseline ADL function  Description: INTERVENTIONS:  -  Assess patient's ability to carry out ADLs; assess patient's baseline for ADL function and identify physical deficits which impact ability to perform ADLs (bathing, care of mouth/teeth, toileting, grooming, dressing, etc )  - Assess/evaluate cause of self-care deficits   - Assess range of motion  - Assess patient's mobility; develop plan if impaired  - Assess patient's need for assistive devices and provide as appropriate  - Encourage maximum independence but intervene and supervise when necessary  - Involve family in performance of ADLs  - Assess for home care needs following discharge   - Consider OT consult to assist with ADL evaluation and planning for discharge  - Provide patient education as appropriate  Outcome: Progressing  Goal: Maintains/Returns to pre admission functional level  Description: INTERVENTIONS:  - Perform BMAT or MOVE assessment daily    - Set and communicate daily mobility goal to care team and patient/family/caregiver  - Collaborate with rehabilitation services on mobility goals if consulted  - Perform Range of Motion 2 times a day  - Reposition patient every 2 hours    - Dangle patient 2 times a day  - Stand patient 2 times a day  - Ambulate patient 2 times a day  - Out of bed to chair 2 times a day   - Out of bed for meals 2 times a day  - Out of bed for toileting  - Record patient progress and toleration of activity level   Outcome: Progressing     Problem: Prexisting or High Potential for Compromised Skin Integrity  Goal: Skin integrity is maintained or improved  Description: INTERVENTIONS:  - Identify patients at risk for skin breakdown  - Assess and monitor skin integrity  - Assess and monitor nutrition and hydration status  - Monitor labs   - Assess for incontinence   - Turn and reposition patient  - Assist with mobility/ambulation  - Relieve pressure over bony prominences  - Avoid friction and shearing  - Provide appropriate hygiene as needed including keeping skin clean and dry  - Evaluate need for skin moisturizer/barrier cream  - Collaborate with interdisciplinary team   - Patient/family teaching  - Consider wound care consult   Outcome: Progressing     Problem: GASTROINTESTINAL - ADULT  Goal: Minimal or absence of nausea and/or vomiting  Description: INTERVENTIONS:  - Administer IV fluids if ordered to ensure adequate hydration  - Maintain NPO status until nausea and vomiting are resolved  - Nasogastric tube if ordered  - Administer ordered antiemetic medications as needed  - Provide nonpharmacologic comfort measures as appropriate  - Advance diet as tolerated, if ordered  - Consider nutrition services referral to assist patient with adequate nutrition and appropriate food choices  Outcome: Progressing  Goal: Maintains or returns to baseline bowel function  Description: INTERVENTIONS:  - Assess bowel function  - Encourage oral fluids to ensure adequate hydration  - Administer IV fluids if ordered to ensure adequate hydration  - Administer ordered medications as needed  - Encourage mobilization and activity  - Consider nutritional services referral to assist patient with adequate nutrition and appropriate food choices  Outcome: Progressing  Goal: Maintains adequate nutritional intake  Description: INTERVENTIONS:  - Monitor percentage of each meal consumed  - Identify factors contributing to decreased intake, treat as appropriate  - Assist with meals as needed  - Monitor I&O, weight, and lab values if indicated  - Obtain nutrition services referral as needed  Outcome: Progressing  Goal: Establish and maintain optimal ostomy function  Description: INTERVENTIONS:  - Assess bowel function  - Encourage oral fluids to ensure adequate hydration  - Administer IV fluids if ordered to ensure adequate hydration   - Administer ordered medications as needed  - Encourage mobilization and activity  - Nutrition services referral to assist patient with appropriate food choices  - Assess stoma site  - Consider wound care consult   Outcome: Progressing  Goal: Oral mucous membranes remain intact  Description: INTERVENTIONS  - Assess oral mucosa and hygiene practices  - Implement preventative oral hygiene regimen  - Implement oral medicated treatments as ordered  - Initiate Nutrition services referral as needed  Outcome: Progressing     Problem: SKIN/TISSUE INTEGRITY - ADULT  Goal: Skin Integrity remains intact(Skin Breakdown Prevention)  Description: Assess:  -Perform Saúl assessment every shift  -Clean and moisturize skin every shift and prn  -Inspect skin when repositioning, toileting, and assisting with ADLS  -Assess extremities for adequate circulation and sensation     Bed Management:  -Have minimal linens on bed & keep smooth, unwrinkled  -Change linens as needed when moist or perspiring  -Avoid sitting or lying in one position for more than 2 hours while in bed  -Keep HOB at 95 degrees     Toileting:  -Offer bedside commode  -Assess for incontinence every 2  -Use incontinent care products after each incontinent episode such as purewick    Activity:  -Mobilize patient 2 times a day  -Encourage activity and walks on unit  -Encourage or provide ROM exercises   -Turn and reposition patient every 2 Hours  -Use appropriate equipment to lift or move patient in bed  -Instruct/ Assist with weight shifting every 2 when out of bed in chair  -Consider limitation of chair time 2 hour intervals    Skin Care:  -Avoid use of baby powder, tape, friction and shearing, hot water or constrictive clothing  -Relieve pressure over bony prominences using green wedge x 2 for repositioning  -Do not massage red bony areas    Next Steps:  -Teach patient strategies to minimize risks such as weight shifting, skin care, nutrition   -Consider consults to  interdisciplinary teams such as nutrition  Outcome: Progressing  Goal: Incision(s), wounds(s) or drain site(s) healing without S/S of infection  Description: INTERVENTIONS  - Assess and document dressing, incision, wound bed, drain sites and surrounding tissue  - Provide patient and family education  - Perform skin care/dressing changes every shift and prn for soilage  Outcome: Progressing  Goal: Pressure injury heals and does not worsen  Description: Interventions:  - Implement low air loss mattress or specialty surface (Criteria met)  - Apply silicone foam dressing  - Instruct/assist with weight shifting every 120 minutes when in chair   - Limit chair time to 2 hour intervals  - Use special pressure reducing interventions such as EHOB cushion, pillows for positioning when in chair   - Apply fecal or urinary incontinence containment device   - Perform passive or active ROM every 2 hr  - Turn and reposition patient & offload bony prominences every 2 hours   - Utilize friction reducing device or surface for transfers   - Consider nutrition services referral as needed  Outcome: Progressing     Problem: MUSCULOSKELETAL - ADULT  Goal: Maintain or return mobility to safest level of function  Description: INTERVENTIONS:  - Assess patient's ability to carry out ADLs; assess patient's baseline for ADL function and identify physical deficits which impact ability to perform ADLs (bathing, care of mouth/teeth, toileting, grooming, dressing, etc )  - Assess/evaluate cause of self-care deficits   - Assess range of motion  - Assess patient's mobility  - Assess patient's need for assistive devices and provide as appropriate  - Encourage maximum independence but intervene and supervise when necessary  - Involve family in performance of ADLs  - Assess for home care needs following discharge   - Consider OT consult to assist with ADL evaluation and planning for discharge  - Provide patient education as appropriate  Outcome: Progressing  Goal: Maintain proper alignment of affected body part  Description: INTERVENTIONS:  - Support, maintain and protect limb and body alignment  - Provide patient/ family with appropriate education  Outcome: Progressing     Problem: PAIN - ADULT  Goal: Verbalizes/displays adequate comfort level or baseline comfort level  Description: Interventions:  - Encourage patient to monitor pain and request assistance  - Assess pain using appropriate pain scale  - Administer analgesics based on type and severity of pain and evaluate response  - Implement non-pharmacological measures as appropriate and evaluate response  - Consider cultural and social influences on pain and pain management  - Notify physician/advanced practitioner if interventions unsuccessful or patient reports new pain  Outcome: Progressing     Problem: INFECTION - ADULT  Goal: Absence or prevention of progression during hospitalization  Description: INTERVENTIONS:  - Assess and monitor for signs and symptoms of infection  - Monitor lab/diagnostic results  - Monitor all insertion sites, i e  indwelling lines, tubes, and drains  - Monitor endotracheal if appropriate and nasal secretions for changes in amount and color  - Radisson appropriate cooling/warming therapies per order  - Administer medications as ordered  - Instruct and encourage patient and family to use good hand hygiene technique  - Identify and instruct in appropriate isolation precautions for identified infection/condition  Outcome: Progressing     Problem: SAFETY ADULT  Goal: Patient will remain free of falls  Description: INTERVENTIONS:  - Educate patient/family on patient safety including physical limitations  - Instruct patient to call for assistance with activity   - Consult OT/PT to assist with strengthening/mobility   - Keep Call bell within reach  - Keep bed low and locked with side rails adjusted as appropriate  - Keep care items and personal belongings within reach  - Initiate and maintain comfort rounds  - Make Fall Risk Sign visible to staff  - Offer Toileting every 2 Hours, in advance of need  - Initiate/Maintain bed/chair alarm  - Apply yellow socks and bracelet for high fall risk patients  - Consider moving patient to room near nurses station  Outcome: Progressing  Goal: Maintain or return to baseline ADL function  Description: INTERVENTIONS:  -  Assess patient's ability to carry out ADLs; assess patient's baseline for ADL function and identify physical deficits which impact ability to perform ADLs (bathing, care of mouth/teeth, toileting, grooming, dressing, etc )  - Assess/evaluate cause of self-care deficits   - Assess range of motion  - Assess patient's mobility; develop plan if impaired  - Assess patient's need for assistive devices and provide as appropriate  - Encourage maximum independence but intervene and supervise when necessary  - Involve family in performance of ADLs  - Assess for home care needs following discharge   - Consider OT consult to assist with ADL evaluation and planning for discharge  - Provide patient education as appropriate  Outcome: Progressing  Goal: Maintains/Returns to pre admission functional level  Description: INTERVENTIONS:  - Perform BMAT or MOVE assessment daily    - Set and communicate daily mobility goal to care team and patient/family/caregiver     - Collaborate with rehabilitation services on mobility goals if consulted  - Perform Range of Motion 2 times a day  - Reposition patient every 2 hours  - Dangle patient 2 times a day  - Stand patient 2 times a day  - Ambulate patient 2 times a day  - Out of bed to chair 2 times a day   - Out of bed for meals 2 times a day  - Out of bed for toileting  - Record patient progress and toleration of activity level   Outcome: Progressing     Problem: DISCHARGE PLANNING  Goal: Discharge to home or other facility with appropriate resources  Description: INTERVENTIONS:  - Identify barriers to discharge w/patient and caregiver  - Arrange for needed discharge resources and transportation as appropriate  - Identify discharge learning needs (meds, wound care, etc )  - Arrange for interpretive services to assist at discharge as needed  - Refer to Case Management Department for coordinating discharge planning if the patient needs post-hospital services based on physician/advanced practitioner order or complex needs related to functional status, cognitive ability, or social support system  Outcome: Progressing     Problem: Knowledge Deficit  Goal: Patient/family/caregiver demonstrates understanding of disease process, treatment plan, medications, and discharge instructions  Description: Complete learning assessment and assess knowledge base  Interventions:  - Provide teaching at level of understanding  - Provide teaching via preferred learning methods  Outcome: Progressing     Problem: Nutrition/Hydration-ADULT  Goal: Nutrient/Hydration intake appropriate for improving, restoring or maintaining nutritional needs  Description: Monitor and assess patient's nutrition/hydration status for malnutrition  Collaborate with interdisciplinary team and initiate plan and interventions as ordered  Monitor patient's weight and dietary intake as ordered or per policy  Utilize nutrition screening tool and intervene as necessary     INTERVENTIONS:  - Monitor intake, urinary output, labs, and treatment plans  - Assess nutrition and hydration status and recommend course of action  - Recommend/ encourage appropriate diets, oral nutritional supplements, and vitamin/mineral supplements  - Order, calculate, and assess calorie counts as needed  - Recommend, monitor, and adjust tube feedings based on assessed needs  - Assess need for intravenous fluids  - Provide nutrition/hydration education as appropriate  - Include patient/family/caregiver in decisions related to nutrition  Outcome: Progressing

## 2022-07-11 NOTE — PLAN OF CARE
Problem: MOBILITY - ADULT  Goal: Maintain or return to baseline ADL function  Description: INTERVENTIONS:  -  Assess patient's ability to carry out ADLs; assess patient's baseline for ADL function and identify physical deficits which impact ability to perform ADLs (bathing, care of mouth/teeth, toileting, grooming, dressing, etc )  - Assess/evaluate cause of self-care deficits   - Assess range of motion  - Assess patient's mobility; develop plan if impaired  - Assess patient's need for assistive devices and provide as appropriate  - Encourage maximum independence but intervene and supervise when necessary  - Involve family in performance of ADLs  - Assess for home care needs following discharge   - Consider OT consult to assist with ADL evaluation and planning for discharge  - Provide patient education as appropriate  Outcome: Progressing  Goal: Maintains/Returns to pre admission functional level  Description: INTERVENTIONS:  - Perform BMAT or MOVE assessment daily    - Set and communicate daily mobility goal to care team and patient/family/caregiver  - Collaborate with rehabilitation services on mobility goals if consulted  - Perform Range of Motion 4 times a day  - Reposition patient every 2 hours    - Dangle patient 4 times a day  - Stand patient 4 times a day  - Ambulate patient 3 times a day  - Out of bed to chair 3 times a day   - Out of bed for meals 3 times a day  - Out of bed for toileting  - Record patient progress and toleration of activity level   Outcome: Progressing     Problem: Prexisting or High Potential for Compromised Skin Integrity  Goal: Skin integrity is maintained or improved  Description: INTERVENTIONS:  - Identify patients at risk for skin breakdown  - Assess and monitor skin integrity  - Assess and monitor nutrition and hydration status  - Monitor labs   - Assess for incontinence   - Turn and reposition patient  - Assist with mobility/ambulation  - Relieve pressure over bony prominences  - Avoid friction and shearing  - Provide appropriate hygiene as needed including keeping skin clean and dry  - Evaluate need for skin moisturizer/barrier cream  - Collaborate with interdisciplinary team   - Patient/family teaching  - Consider wound care consult   Outcome: Progressing    Outcome: Progressing  Goal: Incision(s), wounds(s) or drain site(s) healing without S/S of infection  Description: INTERVENTIONS  - Assess and document dressing, incision, wound bed, drain sites and surrounding tissue  - Provide patient and family education  - Perform skin care/dressing changes every day  Outcome: Progressing  Goal: Pressure injury heals and does not worsen  Description: Interventions:  - Implement low air loss mattress or specialty surface (Criteria met)  - Apply silicone foam dressing  - Instruct/assist with weight shifting every 30 minutes when in chair   - Limit chair time to 2 hour intervals  - Use special pressure reducing interventions such as Allevyn when in chair   - Apply fecal or urinary incontinence containment device   - Perform passive or active ROM every 4  - Turn and reposition patient & offload bony prominences every 2 hours   - Utilize friction reducing device or surface for transfers   - Use incontinent care products after each incontinent episode such as purewick  - Consider nutrition services referral as needed  Outcome: Progressing     Problem: MUSCULOSKELETAL - ADULT  Goal: Maintain or return mobility to safest level of function  Description: INTERVENTIONS:  - Assess patient's ability to carry out ADLs; assess patient's baseline for ADL function and identify physical deficits which impact ability to perform ADLs (bathing, care of mouth/teeth, toileting, grooming, dressing, etc )  - Assess/evaluate cause of self-care deficits   - Assess range of motion  - Assess patient's mobility  - Assess patient's need for assistive devices and provide as appropriate  - Encourage maximum independence but intervene and supervise when necessary  - Involve family in performance of ADLs  - Assess for home care needs following discharge   - Consider OT consult to assist with ADL evaluation and planning for discharge  - Provide patient education as appropriate  Outcome: Progressing  Goal: Maintain proper alignment of affected body part  Description: INTERVENTIONS:  - Support, maintain and protect limb and body alignment  - Provide patient/ family with appropriate education  Outcome: Progressing     Problem: PAIN - ADULT  Goal: Verbalizes/displays adequate comfort level or baseline comfort level  Description: Interventions:  - Encourage patient to monitor pain and request assistance  - Assess pain using appropriate pain scale  - Administer analgesics based on type and severity of pain and evaluate response  - Implement non-pharmacological measures as appropriate and evaluate response  - Consider cultural and social influences on pain and pain management  - Notify physician/advanced practitioner if interventions unsuccessful or patient reports new pain  Outcome: Progressing

## 2022-07-11 NOTE — ASSESSMENT & PLAN NOTE
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

## 2022-07-11 NOTE — WOUND OSTOMY CARE
Progress Note - Wound   Roxy Keys 81 y o  female MRN: [id number]  Unit/Bed#: -01 Encounter: [id number]      Assessment:   Patient admitted due to severe protein malnutrition  History of a-fib , Dementia, HTN  Wound care follow-up for sacral wound  Patient agreeable to assessment, alert and oriented to self, incontinent of bowel and bladder, assist of 2 to turn for assessment, wedges in use for turning and repositioning, heels elevated off of mattress, on a P-500 low air loss mattress, patient is dependent for care, appears thin and malnourished  Primary RN, Unit Manager, and SLIM attending made aware of assessment findings  Nutrition is following patient- patient receiving tube feeds via PEG tube      Recommend patient follow-up out-patient with Wound center      1  Pressure injury sacrum, stage 4-POA- Appears that portion of wound is an evolving DTI, from wound pictured near admission there is evidence of skin that was a light brown color suggesting the DTI was continuing to evolve out on admission  Wound is irregular in shape with scattered open aspects around main wound bed, is full-thickness, probes to bone, undermining of approx  5 5 cm from 9-3 o'clock (Deepest at 3 o'clock) suggesting component of friction to etiology of wound, visible wound bed is approx  10% yellow slough, 30% deep purple/black tissue, and 60% beefy red tissue, with moderate amount of serosanguineous and tan drainage, no green/blue hue noted to drainage on assessment  Taylor-wound is dry, intact, blanchable pink skin      2  Pressure injury right heel, DTI-HA- Wound is irregular in shape, dry and intact skin, true depth unknown, 100% non-blanchable purple skin  Taylor-wound is dry, intact, blanchable pink skin  This wound has the potential to evolve to a full thickness injury, stage 3 or 4     3  Pressure injury left hip, evolving DTI-HA- Wounds are linear in shape, true depth unknown, approx   20% non-blanchable pink partial thickness tissue and 80% non-blanchable deep purple skin, with no drainage noted  Taylor-wounds are dry, intact, blanchable pink skin  This wound has the potential to evolve to a full thickness injury, stage 3 or 4     4  Left heel, right hips, and bilateral elbows- skin is dry, intact, blanchable pink skin      Educated patient on importance of frequent offloading of pressure via turning, repositioning, and weight-shifting       No induration, fluctuance, odor, warmth, redness, or purulence noted to the above noted wound  New dressing applied  Patient tolerated fairly well, reports moderate to severe pain to the wound  Primary nurse aware of plan of care  See flow sheets for more detailed assessment findings  Will follow along        Skin care plans:  1-Hydraguard to bilateral buttock and heels BID and PRN  2-Elevate heels to offload pressure  3-Ehob cushion in chair when out of bed  4-Moisturize skin daily with skin nourishing cream   5-Turn/reposition q2h for pressure re-distribution on skin  6-Mid sacral wound- Cleanse wound with Dakin's solution  Then rinse with NSS, pat dry  Apply nickel thick layer of santyl to wound bed, then pack wound lightly with plain packing ribbon  Cover with 4x4, and then Allevyn foam dressing  Change daily and as needed for soilage/dislodgment  7-Left hip and right heel- Cleanse wounds with soap and water, pat dry  Apply Allevyn foam dressing over wounds  Edwin w/T and date  Change every 3 days and as needed  Peel back and check skin integrity every shift   8-P-500 low air loss mattress        Wound 07/01/22 Pressure Injury Sacrum (Active)   Wound Image   07/11/22 0839   Wound Description Beefy red;Fragile;Probes to bone 07/11/22 1000   Pressure Injury Stage 4 07/11/22 0839   Taylor-wound Assessment Pink;Fragile 07/11/22 1000   Wound Length (cm) 3 5 cm 07/11/22 0839   Wound Width (cm) 6 5 cm 07/11/22 0839   Wound Depth (cm) 1 4 cm 07/11/22 0839   Wound Surface Area (cm^2) 22 75 cm^2 07/11/22 0839   Wound Volume (cm^3) 31 85 cm^3 07/11/22 0839   Calculated Wound Volume (cm^3) 31 85 cm^3 07/11/22 0839   Change in Wound Size % -99 06 07/11/22 0839   Tunneling 0 cm 07/11/22 0839   Undermining 5 5 07/11/22 0839   Undermining is depth extending from 9-3 07/11/22 0839   Drainage Amount Moderate 07/11/22 1000   Drainage Description Serosanguineous; tan 07/11/22 1000   Non-staged Wound Description Full thickness 07/11/22 1000   Treatments Cleansed;Irrigation with NSS;Site care 07/11/22 0839   Dressing Other (Comment);Dry dressing; Foam, Silicon (eg  Allevyn, etc) 07/11/22 1000   Wound packed? Yes 07/11/22 1000   Packing- # removed 1 07/11/22 1000   Packing- # inserted 1 07/11/22 1000   Dressing Changed Changed 07/11/22 0839   Patient Tolerance Tolerated well 07/11/22 0839   Dressing Status Clean;Dry; Intact 07/11/22 1000       Wound 07/11/22 Pressure Injury Heel Right (Active)   Wound Image   07/11/22 0849   Wound Description Dry; Intact; Light purple 07/11/22 0849   Pressure Injury Stage DTPI 07/11/22 0849   Taylor-wound Assessment Dry; Intact 07/11/22 0849   Wound Length (cm) 1 1 cm 07/11/22 0849   Wound Width (cm) 2 cm 07/11/22 0849   Wound Depth (cm) 0 cm 07/11/22 0849   Wound Surface Area (cm^2) 2 2 cm^2 07/11/22 0849   Wound Volume (cm^3) 0 cm^3 07/11/22 0849   Calculated Wound Volume (cm^3) 0 cm^3 07/11/22 0849   Tunneling 0 cm 07/11/22 0849   Undermining 0 07/11/22 0849   Drainage Amount None 07/11/22 0849   Non-staged Wound Description Not applicable 11/09/67 3258   Treatments Cleansed;Site care;Elevated 07/11/22 0849   Dressing Moisture barrier 07/11/22 0849   Wound packed?  No 07/11/22 0849   Dressing Changed New 07/11/22 0849   Patient Tolerance Tolerated well 07/11/22 0849       Wound 07/11/22 Pressure Injury Hip Left (Active)   Wound Image    07/11/22 0852   Wound Description Dry;Non-blanchable erythema;Light purple 07/11/22 0852   Pressure Injury Stage DTPI 07/11/22 0852   Taylor-wound Assessment Dry;Intact; Pink 07/11/22 0852   Wound Length (cm) 11 cm 07/11/22 0852   Wound Width (cm) 6 cm 07/11/22 0852   Wound Depth (cm) 0 1 cm 07/11/22 0852   Wound Surface Area (cm^2) 66 cm^2 07/11/22 0852   Wound Volume (cm^3) 6 6 cm^3 07/11/22 0852   Calculated Wound Volume (cm^3) 6 6 cm^3 07/11/22 0852   Tunneling 0 cm 07/11/22 0852   Undermining 0 07/11/22 0852   Drainage Amount None 07/11/22 0852   Non-staged Wound Description Not applicable 94/28/25 6047   Treatments Cleansed;Site care 07/11/22 0852   Dressing Foam, Silicon (eg  Allevyn, etc) 07/11/22 5120   Wound packed? No 07/11/22 0852   Dressing Changed Changed 07/11/22 0852   Patient Tolerance Tolerated well 07/11/22 0852   Dressing Status Clean;Dry; Intact 07/11/22 0852       Call or tigertext with any questions  Wound Care will continue to follow    Nenita FIGUEROAN RN [facility]  Wound care

## 2022-07-11 NOTE — PROGRESS NOTES
[facility]  Progress Note - Helene [place] 1935, 81 y o  female MRN: [id number]  Unit/Bed#: -01 Encounter: [id number]  Primary Care Provider: Aureliano Teixeira   Date and time admitted to hospital: 7/1/2022  4:10 PM    * Severe protein-calorie malnutrition ([facility] )  Assessment & Plan  Malnutrition Findings:   Adult Malnutrition type: Chronic illness  Adult Degree of Malnutrition: Other severe protein calorie malnutrition  Malnutrition Characteristics: Fat loss, Inadequate energy, Weight loss   360 Statement: Malnutrition related to dementia as evidenced by 14#(9%) weight loss from 5/14/# to 7/1/#, <75% energy intake compared to estimated needs >1 month, temporal wasting, sunken orbitals  BMI Findings: Body mass index is 24 02 kg/m²  Nutrition recommendations appreciated  Status post PEG tube placement 07/02/2022  Tolerating tube feeds        SIRS (systemic inflammatory response syndrome) ([facility] )  Assessment & Plan  Noted yesterday evidenced by tachycardia, leukocytosis  Does have an elevated procalcitonin  Unknown cause at this time    Chest x-ray was completed to rule out aspiration; no sign of aspiration  CT abdomen and right upper quadrant ultrasound revealed no acute infection  Urinalysis was never collected  Clinically improved, leukocytosis has since resolved  Will continue ceftriaxone for now    Stage IV pressure ulcer (HCC)  Assessment & Plan  · Stage 4 Pressure Injury of Sacrum, POA, in setting of advanced dementia evidenced by wound care documentation on 7/4/22 treated with Dakin's rinse/ Santyl, Ehob cushion when out of bed, low air loss mattress and turn/reposition      Generalized weakness  Assessment & Plan  Patient with generalized weakness and according to son, not pleased with patient's care at her prior nursing facility  PT/OT, STR  Case management for placement    Dementia with behavioral disturbance [facility])  Assessment & Plan  Currently at baseline  Continue Remeron  Delirium precautions    DVT (deep venous thrombosis) ([facility])  Assessment & Plan  · Continue Eliquis      VTE Pharmacologic Prophylaxis:   Pharmacologic: Pharmacologic VTE Prophylaxis contraindicated due to low risk  Mechanical VTE Prophylaxis in Place: Yes    Patient Centered Rounds: I have performed bedside rounds with nursing staff today  Discussions with Specialists or Other Care Team Provider: cm, nursing    Education and Discussions with Family / Patient: pt    Time Spent for Care: 30 minutes  More than 50% of total time spent on counseling and coordination of care as described above  Current Length of Stay: 9 day(s)    Current Patient Status: Inpatient   Certification Statement: The patient will continue to require additional inpatient hospital stay due to see below    Discharge Plan:  Medically clear awaiting rehab placement    Code Status: Level 1 - Full Code      Subjective:   Denies chest pain shortness breath cough, fevers    Objective:     Vitals:   Temp (24hrs), Av °F (37 2 °C), Min:98 3 °F (36 8 °C), Max:99 6 °F (37 6 °C)    Temp:  [98 3 °F (36 8 °C)-99 6 °F (37 6 °C)] 98 3 °F (36 8 °C)  HR:  [] 99  Resp:  [17-18] 18  BP: (146-148)/(76-95) 146/76  SpO2:  [97 %-98 %] 98 %  Body mass index is 24 02 kg/m²  Input and Output Summary (last 24 hours): Intake/Output Summary (Last 24 hours) at 2022 0754  Last data filed at 7/10/2022 1801  Gross per 24 hour   Intake 840 ml   Output --   Net 840 ml       Physical Exam:     Physical Exam  Constitutional:       General: She is not in acute distress  Appearance: She is well-developed  She is not diaphoretic  HENT:      Head: Normocephalic and atraumatic  Nose: Nose normal       Mouth/Throat:      Pharynx: No oropharyngeal exudate  Eyes:      General: No scleral icterus  Right eye: No discharge  Left eye: No discharge        Conjunctiva/sclera: Conjunctivae normal    Neck: Thyroid: No thyromegaly  Vascular: No JVD  Cardiovascular:      Rate and Rhythm: Normal rate and regular rhythm  Heart sounds: Normal heart sounds  No murmur heard  No friction rub  No gallop  Pulmonary:      Effort: Pulmonary effort is normal  No respiratory distress  Breath sounds: Normal breath sounds  No wheezing or rales  Chest:      Chest wall: No tenderness  Abdominal:      General: Bowel sounds are normal  There is no distension  Palpations: Abdomen is soft  Tenderness: There is no abdominal tenderness  There is no guarding or rebound  Musculoskeletal:         General: No tenderness or deformity  Normal range of motion  Cervical back: Normal range of motion and neck supple  Skin:     General: Skin is warm and dry  Findings: No erythema or rash  Neurological:      Mental Status: She is alert  Mental status is at baseline  Cranial Nerves: No cranial nerve deficit  Sensory: No sensory deficit  Motor: No abnormal muscle tone  Coordination: Coordination normal          Additional Data:     Labs:    Results from last 7 days   Lab Units 07/10/22  0547   WBC Thousand/uL 7 64   HEMOGLOBIN g/dL 10 2*   HEMATOCRIT % 31 6*   PLATELETS Thousands/uL 353   NEUTROS PCT % 58   LYMPHS PCT % 25   MONOS PCT % 12   EOS PCT % 3     Results from last 7 days   Lab Units 07/10/22  0547   SODIUM mmol/L 135*   POTASSIUM mmol/L 3 1*   CHLORIDE mmol/L 102   CO2 mmol/L 25   BUN mg/dL 15   CREATININE mg/dL 0 59*   ANION GAP mmol/L 8   CALCIUM mg/dL 8 7   ALBUMIN g/dL 1 8*   TOTAL BILIRUBIN mg/dL 0 85   ALK PHOS U/L 78   ALT U/L 17   AST U/L 48*   GLUCOSE RANDOM mg/dL 101                 Results from last 7 days   Lab Units 07/08/22  0642 07/07/22  0905   PROCALCITONIN ng/ml 3 90* 8 47*           * I Have Reviewed All Lab Data Listed Above  * Additional Pertinent Lab Tests Reviewed:  All Labs Within Last 24 Hours Reviewed    Imaging:    Imaging Reports Reviewed Today Include: na  Imaging Personally Reviewed by Myself Includes:  na    Recent Cultures (last 7 days):           Last 24 Hours Medication List:   Current Facility-Administered Medications   Medication Dose Route Frequency Provider Last Rate    acetaminophen  650 mg Oral Q6H PRN Eileen Posey PA-C      apixaban  5 mg Per PEG Tube BID JUDIE Goss      cefTRIAXone  1,000 mg Intravenous Q24H Ismael Chaudhary MD 1,000 mg (07/10/22 1426)    collagenase   Topical Daily JUDIE Goss      metoclopramide  10 mg Intravenous Q6H [facility] JUDIE Orourke      multivitamin with iron-minerals  15 mL Oral Daily Ismael Chaudhary MD      sodium hypochlorite   Irrigation Daily JUDIE Goss          Today, Patient Was Seen By: Leana العراقي MD    ** Please Note: Dictation voice to text software may have been used in the creation of this document   **

## 2022-07-13 ENCOUNTER — NURSING HOME VISIT (OUTPATIENT)
Dept: WOUND CARE | Facility: HOSPITAL | Age: 87
End: 2022-07-13
Payer: MEDICARE

## 2022-07-13 ENCOUNTER — NURSING HOME VISIT (OUTPATIENT)
Dept: GERIATRICS | Facility: OTHER | Age: 87
End: 2022-07-13
Payer: MEDICARE

## 2022-07-13 DIAGNOSIS — E43 SEVERE PROTEIN-CALORIE MALNUTRITION (HCC): ICD-10-CM

## 2022-07-13 DIAGNOSIS — T14.8XXA TRAUMATIC INJURY TO SKIN OR SUBCUTANEOUS TISSUE: ICD-10-CM

## 2022-07-13 DIAGNOSIS — L89.154 PRESSURE INJURY OF SACRAL REGION, STAGE 4 (HCC): ICD-10-CM

## 2022-07-13 DIAGNOSIS — R41.0 CONFUSED: ICD-10-CM

## 2022-07-13 DIAGNOSIS — F03.91 DEMENTIA WITH BEHAVIORAL DISTURBANCE, UNSPECIFIED DEMENTIA TYPE: ICD-10-CM

## 2022-07-13 DIAGNOSIS — T14.8XXA DEEP TISSUE INJURY: Primary | ICD-10-CM

## 2022-07-13 DIAGNOSIS — R53.1 GENERALIZED WEAKNESS: Primary | ICD-10-CM

## 2022-07-13 PROCEDURE — 99304 1ST NF CARE SF/LOW MDM 25: CPT | Performed by: FAMILY MEDICINE

## 2022-07-13 PROCEDURE — 99305 1ST NF CARE MODERATE MDM 35: CPT | Performed by: NURSE PRACTITIONER

## 2022-07-13 NOTE — ASSESSMENT & PLAN NOTE
Right heel  -POA to SNF  - purple, non blanchble  - + DP and PT on the right foot  - local wound care with skin prep   - offload  - followup next week

## 2022-07-13 NOTE — ASSESSMENT & PLAN NOTE
Patient hospitalized 7/1 - 7/11 due to generalized weakness, intractable N/V and found to have severe protein malnutrition  Patient requiring PEG tube placement 7/2  Patient was noted to some issues with delayed gastric emptying while hospitalized and episodes of vomiting      Plan  - Continue PEG tube feedings with Jevity with goal of 60 mL/hr  - Encourage PO food intake with Pureed/thin as able (cleared by Nutrition in hospital)  - Reglan 10 mg IV PRN for N/V, gastroparesis  - I/O's  - Daily weights

## 2022-07-13 NOTE — PROGRESS NOTES
[address] MANAGEMENT   AND HYPERBARIC MEDICINE CENTER       Patient ID: Joao Greene is a 81 y o  female Date of Birth 1935     Location of Service: [address] [place] [address]    Performed wound round with: Wound team       Chief complaint: multiple wounds    Wound Instructions:  Wound:  Right hand  Discontinue previous wound order  Cleanse the wound bed with NSS   Apply non-sting skin prep to periwound area  Apply bordered foam to wound bed  Frequency : three times a week and prn for soiling    Wound : Right heel  Cleanse with NSS  Apply skin prep to periwound and wound bed  Daily and prn for soiling    Prevalon boots when in bed  Offload all wounds  Turn and reposition frequently, maximum of every two hours  Instruct / Assist with weight shifting every 15 - 20 minutes when in chair  Increase protein intake  Monitor for any sign of infection or worsening, inform PCP or patient's primary physician in your facility  Allergies  Patient has no known allergies  Assessment & Plan:  1  Deep tissue injury  Assessment & Plan:  Right heel  -POA to SNF  - purple, non blanchble  - + DP and PT on the right foot  - local wound care with skin prep   - offload  - followup next week      2  Traumatic injury to skin or subcutaneous tissue  Assessment & Plan:  Right forearm  - POA to SNF  - partial-thickness, with no obvious sign of infection, wound edges irregular  - no obvious sign of infection  - local wound care with border foam        3  Confused  Assessment & Plan:  Patient is non-cooperative during visit             Subjective: This is a 81year old female referred to our service for multiple wounds   Patient have a complex medical history including but not limited to dementia, DVT on Eliquis, severe protein malnutrition  Patient was referred by Senior Care Team  Patient was seen in collaboration with the facility wound team      Received patient in bed, yelling, " Stop touching me"   When asked if ts ok to see her wounds, patient answered "no"  Patient is confuse  Review of Systems   Unable to perform ROS: Dementia       Objective: There were no vitals taken for this visit  Physical Exam  Constitutional:       Appearance: She is ill-appearing  HENT:      Head: Normocephalic and atraumatic  Nose: Nose normal       Mouth/Throat:      Pharynx: Oropharynx is clear  Eyes:      Conjunctiva/sclera: Conjunctivae normal    Pulmonary:      Effort: Pulmonary effort is normal    Abdominal:      Tenderness: There is no abdominal tenderness  There is no guarding  Comments: With peg tube   Genitourinary:     Comments: incontinent  Musculoskeletal:         General: No tenderness  Cervical back: Normal range of motion  Right lower leg: No edema  Left lower leg: No edema  Comments: LROM   Skin:     General: Skin is warm  Findings: Lesion present  Comments: Buttocks - not assess  Right hand - no wound  Right arm forearm - wound size is 2 x 1 x 0 1 cm  , 100% epithelial ,  small amount of blood, periwound is normal, no obvious sign of infection    Sacrum - not assess    Right heel - 1 x 1  Cm, purple, non blanchable, with no obvious sign of infection    Left shoulder, left hip , left heel - not assess         Neurological:      Mental Status: She is alert  Gait: Gait abnormal    Psychiatric:         Mood and Affect: Mood normal          Behavior: Behavior normal               Procedures           Patient's care was coordinated with nursing facility staff  Recent vitals, labs and updated medications were reviewed on EMR or chart system of facility   Past Medical, surgical, social, medication and allergy history and patient's previous records were reviewed and updated as appropriate:     Patient Active Problem List   Diagnosis    DVT [facility]deep venous thrombosis) ([facility] )    Essential hypertension    Dementia with behavioral disturbance (HCC)    Generalized weakness    Severe protein-calorie malnutrition ([place] [address] )    Electrolyte disturbance    Stage IV pressure ulcer (HCC)    SIRS (systemic inflammatory response syndrome) (HCC)    Deep tissue injury    Traumatic injury to skin or subcutaneous tissue    Confused     Past Medical History:   Diagnosis Date    A-fib ([place] [address] )     Dementia ([place] [address] )     DVT (deep venous thrombosis) ([place] [address] )     Hypertension     Pressure ulcer of sacral region      History reviewed  No pertinent surgical history  Social History     Socioeconomic History    Marital status:      Spouse name: None    Number of children: None    Years of education: None    Highest education level: None   Occupational History    None   Tobacco Use    Smoking status: Never Smoker    Smokeless tobacco: Never Used   Vaping Use    Vaping Use: Never used   Substance and Sexual Activity    Alcohol use: Not Currently    Drug use: Never    Sexual activity: Not Currently   Other Topics Concern    None   Social History Narrative    None     Social Determinants of Health     Financial Resource Strain: Not on file   Food Insecurity: No Food Insecurity    Worried About Running Out of Food in the Last Year: Never true    Stoney of Food in the Last Year: Never true   Transportation Needs: No Transportation Needs    Lack of Transportation (Medical): No    Lack of Transportation (Non-Medical):  No   Physical Activity: Not on file   Stress: Not on file   Social Connections: Not on file   Intimate Partner Violence: Not on file   Housing Stability: Low Risk     Unable to Pay for Housing in the Last Year: No    Number of Places Lived in the Last Year: 2    Unstable Housing in the Last Year: No        Current Outpatient Medications:     cefpodoxime (VANTIN) 200 mg tablet, Take 1 tablet (200 mg total) by mouth 2 (two) times a day for 4 days, Disp: 8 tablet, Rfl: 0    Eliquis 5 MG, Hold medication today, 6/30/2022 and tomorrow, 7/1/2022, Disp: 30 tablet, Rfl: 3    pantoprazole (PROTONIX) 40 mg tablet, Take 40 mg by mouth, Disp: , Rfl:     Skin Protectants, Misc  (PeriGuard) OINT, Apply topically, Disp: , Rfl:   History reviewed  No pertinent family history  Coordination of Care: Wound team aware of the treatment plan  Facility nurse will provide wound treatment and monitor the wound for any changes  Patient / Staff education : Staff was given education on sign of infection and pressure ulcer prevention  Staff verbalized understanding     Follow up :  Next week    Voice-recognition software may have been used in the preparation of this document  Occasional wrong word or "sound-alike" substitutions may have occurred due to the inherent limitations of voice recognition software  Interpretation should be guided by context        JUDIE Neil

## 2022-07-13 NOTE — ASSESSMENT & PLAN NOTE
Right forearm  - POA to SNF  - partial-thickness, with no obvious sign of infection, wound edges irregular  - no obvious sign of infection  - local wound care with border foam

## 2022-07-13 NOTE — PROGRESS NOTES
[facility]  [address] [facility], [address] [place] [address]   [facility] 31  History and Physical    NAME: Justus Ya  AGE: 81 y o  SEX: female [id number]    DATE OF ENCOUNTER: 7/13/2022    Code status:  CPR    Assessment and Plan     Diagnoses and all orders for this visit:    Generalized weakness   Patient hospitalized 7/1 - 7/11 due to generalized weakness, intractable N/V and found to have severe protein malnutrition  Patient continues to have barriers preventing return to baseline status of mobility despite improvement in labs, supplemental nutrition and wound control  Progress limited due to fear of pain provocation, poor orientation, inability to concentrate under maximum structure, lack of ability to initiate activity, self limiting and non compliance  Recommending post acute rehabilitation  Plan  - PT/OT eval and treat  - Pain management      Severe protein-calorie malnutrition ([address] )  Patient hospitalized 7/1 - 7/11 due to generalized weakness, intractable N/V and found to have severe protein malnutrition  Patient requiring PEG tube placement 7/2  Patient was noted to some issues with delayed gastric emptying while hospitalized and episodes of vomiting  Plan  - Continue Jevity PEG tube feedings with goal of 60 mL/hr  - Encourage PO food intake with Pureed/thin as able (cleared by Nutrition in hospital)  - Reglan 10 mg IV PRN for N/V, gastroparesis  - I/O's  - Daily weights      Pressure injury of sacral region, stage 4 (HCC)  Stage 4 decubitus ulcer of the sacrum POA to the hospital 7/1  Imaging revealing no signs of osteomyelitis  Patient was living at a prior nursing facility before admission  Wound cared for with Dakin's rinse/Santyl, Ehob cushion when out of bed, low air loss mattress and turn/repositioning  On exam at [address] [place] [address] patient denies any pain or discomfort       Plan  - Wound care   - Pain management  - Consider scheduled Tylenol        Dementia with behavioral disturbance, unspecified dementia type [facility])  Patient with PMH of dementia  Noted to have difficulty answering questions as to where she was and to the timeline of how she ended up at [address]  Plan  - Delirium precautions   - Ensure regular daily BM's  - Pain management  - Mobilize patient as able         All medications and routine orders were reviewed and updated as needed  Plan discussed with: Patient    Chief Complaint     Seen for admission at [address]    History of Present Illness     Patient PMHx of A-fib on Eliquis, HTN, dementia presents to [address] after being hospitalized from 7/1 - 7/11 due to generalized weakness, intractable nausea and vomiting  While hospitalized patient was found to have severe protein malnutrition due to poor oral intake requiring PEG tube placement on 7/2  Patient noted to meet SIRS criteria 7/7 with tachycardia, leukocytosis  There was no clear site of infection as no signs of osteomyelitis of stage 4 decubitus ulcer of the sacrum (POA) and CXR and CT abdomen revealed no acute infections  Patient leukocytosis and procal noted to trend down prior to discharge  At discharge patient was prescribed cefpodoxime 200 mg BID for 4 days  PT noted patient continues to function below baseline level and is requesting STR after discharge  Noted barriers to improvement noted to be agitation and patients cognitive status  On evaluation patient was noted to be resting comfortably in bed  Patients PEG tube in place and running at 10 mL/hr  On questioning patient appears confused as she struggles recalling events in the hospital or even that she was hospitalized  Noted to also have difficulty recalling where she currently was  She denies any pain or discomfort at this time  She is without any nausea, vomiting, abdominal pain/distention  Physical exam findings detailed below       HISTORY:  Past Medical History:   Diagnosis Date    A-fib ([place] [address] )     Dementia ([place] [address] )     DVT (deep venous thrombosis) ([place] [address] )     Hypertension     Pressure ulcer of sacral region      History reviewed  No pertinent family history  Social History     Socioeconomic History    Marital status:      Spouse name: None    Number of children: None    Years of education: None    Highest education level: None   Occupational History    None   Tobacco Use    Smoking status: Never Smoker    Smokeless tobacco: Never Used   Vaping Use    Vaping Use: Never used   Substance and Sexual Activity    Alcohol use: Not Currently    Drug use: Never    Sexual activity: Not Currently   Other Topics Concern    None   Social History Narrative    None     Social Determinants of Health     Financial Resource Strain: Not on file   Food Insecurity: No Food Insecurity    Worried About Running Out of Food in the Last Year: Never true    Stoney of Food in the Last Year: Never true   Transportation Needs: No Transportation Needs    Lack of Transportation (Medical): No    Lack of Transportation (Non-Medical): No   Physical Activity: Not on file   Stress: Not on file   Social Connections: Not on file   Intimate Partner Violence: Not on file   Housing Stability: Low Risk     Unable to Pay for Housing in the Last Year: No    Number of Places Lived in the Last Year: 2    Unstable Housing in the Last Year: No       Allergies:  No Known Allergies    Review of Systems     Review of Systems   Constitutional: Positive for appetite change  Negative for chills and fever  HENT: Negative for congestion and rhinorrhea  Eyes: Negative for discharge and redness  Respiratory: Negative for chest tightness and shortness of breath  Cardiovascular: Positive for leg swelling  Negative for chest pain and palpitations  Gastrointestinal: Negative for abdominal distention, abdominal pain, constipation, diarrhea, nausea and vomiting     Genitourinary: Negative for difficulty urinating, dysuria and hematuria  Musculoskeletal: Negative for back pain  Skin: Positive for wound  Neurological: Negative for dizziness, light-headedness and headaches  Psychiatric/Behavioral: Positive for confusion  Medications and orders     All medications reviewed and updated in Nursing Home EMR  Objective     Vitals: Temp: 97 3, HR: 78, RR: 16, /62, SpO2: 98%, Wt: 148 lbs    Physical Exam  Constitutional:       General: She is not in acute distress  Appearance: Normal appearance  She is not ill-appearing  HENT:      Right Ear: External ear normal       Left Ear: External ear normal    Eyes:      Extraocular Movements: Extraocular movements intact  Conjunctiva/sclera: Conjunctivae normal    Cardiovascular:      Rate and Rhythm: Normal rate and regular rhythm  Pulses: Normal pulses  Heart sounds: Normal heart sounds  Pulmonary:      Effort: Pulmonary effort is normal       Breath sounds: Normal breath sounds  Musculoskeletal:         General: Swelling and tenderness present  Cervical back: Normal range of motion  Right lower leg: No edema  Left lower leg: No edema  Skin:     General: Skin is warm and dry  Findings: Ecchymosis (b/l arms ) present  Bruising: b/l arms    Neurological:      Mental Status: She is alert  She is disoriented  Psychiatric:         Mood and Affect: Mood normal          Behavior: Behavior normal          Pertinent Laboratory/Diagnostic Studies: The following labs/studies were reviewed please see chart or hospital paperwork for details      - Counseling Documentation: patient was counseled regarding: impressions and importance of compliance with treatment

## 2022-07-15 ENCOUNTER — NURSING HOME VISIT (OUTPATIENT)
Dept: GERIATRICS | Facility: OTHER | Age: 87
End: 2022-07-15
Payer: MEDICARE

## 2022-07-15 VITALS
HEART RATE: 114 BPM | DIASTOLIC BLOOD PRESSURE: 76 MMHG | SYSTOLIC BLOOD PRESSURE: 133 MMHG | BODY MASS INDEX: 23.89 KG/M2 | OXYGEN SATURATION: 98 % | TEMPERATURE: 97.8 F | WEIGHT: 148 LBS | RESPIRATION RATE: 18 BRPM

## 2022-07-15 DIAGNOSIS — R65.10 SIRS (SYSTEMIC INFLAMMATORY RESPONSE SYNDROME) (HCC): ICD-10-CM

## 2022-07-15 DIAGNOSIS — L89.154 PRESSURE INJURY OF SACRAL REGION, STAGE 4 (HCC): ICD-10-CM

## 2022-07-15 DIAGNOSIS — I82.409 DEEP VEIN THROMBOSIS (DVT) OF LOWER EXTREMITY, UNSPECIFIED CHRONICITY, UNSPECIFIED LATERALITY, UNSPECIFIED VEIN (HCC): ICD-10-CM

## 2022-07-15 DIAGNOSIS — F03.91 DEMENTIA WITH BEHAVIORAL DISTURBANCE, UNSPECIFIED DEMENTIA TYPE: Primary | ICD-10-CM

## 2022-07-15 DIAGNOSIS — E43 SEVERE PROTEIN-CALORIE MALNUTRITION (HCC): ICD-10-CM

## 2022-07-15 PROCEDURE — 99309 SBSQ NF CARE MODERATE MDM 30: CPT | Performed by: NURSE PRACTITIONER

## 2022-07-15 NOTE — PROGRESS NOTES
[facility]  [address]  [phone]  [place]  Code 31 (STR)      NAME: Roya Stock  AGE: 81 y o  SEX: female CODE STATUS: CPR    DATE OF ENCOUNTER: 7/15/2022    Assessment and Plan     1  Dementia with behavioral disturbance, unspecified dementia type ([facility] )  Assessment & Plan:  · AA out X 1   · Confused   Provide redirection, reorientation, distraction techniques  Fall Precautions  Assist with ADLs/IADLs  Avoid deliriogenic medications such as tramadol, benzodiazepines, anticholinergics, benadryl  Encourage Hydration/ Nutrition  Implement sleep hygiene, limit night time interuptions   Encourage participation in group activities when appropriate         2  Severe protein-calorie malnutrition (HCC)  Assessment & Plan:  Malnutrition Findings:   · Chronic Illness  · Activity intolerance   · Weight loss   · Reviewed Labs while inpatient -Albumin 1 7 and Protein 5 3  BMI Findings: Body mass index is 23 89 kg/m²  · S/p PEG tube placement 7/2/2022  · Jevity   · Puree with Thin as tolerated   · RD to follow at SNF      3  Pressure injury of sacral region, stage 4 [facility])  Assessment & Plan:  · Present on admission to inpatient hospital stay   · Wound was not visualized on exam today  · Per review of nursing report there is undermining  · Patient was previously treated with Dakin's rinse/santal   · Offload  · Provide incontinence care  · [address] [facility] CR[facility] to follow during weekly rounds-appreciate recommendations      4   SIRS (systemic inflammatory response syndrome) (HCC)  Assessment & Plan:  · Developed while inpatient with tachycardia and leukocytosis   · Unknown etiology  · Chest x-ray negative  · CT abdomen and right upper quadrant ultrasound negative for infectious process   · Urinalysis was not collected  · Clinically patient had improved with leukocytosis resolved   · Patient was treated with IV ceftriaxone and discharged on p o  cefpodoxime  x4 days  · Patient is afebrile vital signs stable on exam today  · Monitor CBC and BMP at       5  Deep vein thrombosis (DVT) of lower extremity, unspecified chronicity, unspecified laterality, unspecified vein (HCC)  Assessment & Plan:  · Continue Eliquis 5 mg b i d  All medications and routine orders were reviewed and updated as needed  Chief Complaint     STR follow up visit    Past Medical and Surgica History      Past Medical History:   Diagnosis Date    A-fib [facility] )     Dementia ([facility] )     [facility]     Hypertension     Pressure ulcer of sacral region      No past surgical history on file  No Known Allergies       History of Present Illness     Dolores Ascencio is a 81year old male admitted to [facility] on 7/11/2022 for STR  Past Medical Hx including but not limited to HTN, DVT, AFIB, Dementia, Malnutrition, Pressure Ulcer, Generalized weakness and Ambulatory Dysfunction  seen in collaboration with nursing for medical mgmt and STR follow up  Hospital Course:   [place] [place] [facility] 7/1/2022 through 7/11/2022 with diagnosis of severe protein calorie malnutrition  Patient presented to ED with generalized weakness with intractable nausea and vomiting  She was recommended to have a PEG tube placed, this was performed by IR on 7/2/2022  and was started on tube feeds with Jevity  Patient did meet SIRS criteria with tachycardia and leukocytosis  No clear source of infection was identified as chest x-ray and CT abdomen negative  Patient did present to hospital with 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 sacrum  Imaging did not reveal any osteomyelitis  Patient was prescribed cefpodoxime 200 mg b i d  x4 days upon discharge  PT OT recommended discharge to short-term rehab  Rehab:   Seen and examined at bedside today  Patient is a poor historian due to dementia  PT and OT at bedside during my exam, attempting to work with patient    Patient is alert but confused  She is AAO X 1 on exam   Per review of records patient has with her son is mostly bedbound is able to get onto edge of bed  Patient has bilateral nonpitting moderate lower extremity edema  She has some left upper extremity edema and some bruising to her bilateral arms  Nursing state patient has a stage IV sacral ulcer  This was not visualized on my exam today  Patient is requesting to lay back down  She is somewhat uncooperative with exam     Per review of SNF records, Patient is eating 1 meals per day with no intake on some days, when she does eat she is consuming only 25%  She is incontinent of bowel and bladder, her Last documented BM was 7/14/2022  Patient is actively participating in therapy, she has noted poor activity tolerance, she requires a mechanical lift with assist x2 out of bed  No concerns from nursing at this time  The patient's allergies, past medical, surgical, social and family history were reviewed and unchanged  Review of Systems     Review of Systems   Unable to perform ROS: Dementia         Objective     Vitals:   Vitals:    07/15/22 1341   BP: 133/76   Pulse: (!) 114   Resp: 18   Temp: 97 8 °F (36 6 °C)   SpO2: 98%         Physical Exam  Vitals and nursing note reviewed  Constitutional:       General: She is not in acute distress  Appearance: Normal appearance  She is ill-appearing  Comments: Cachectic elderly frail female sitting on edge of bed appears to be in mild distress  While lying down patient does not appear in any distress  HENT:      Head: Normocephalic and atraumatic  Nose: No congestion or rhinorrhea  Mouth/Throat:      Mouth: Mucous membranes are dry  Eyes:      General: No scleral icterus  Conjunctiva/sclera: Conjunctivae normal       Pupils: Pupils are equal, round, and reactive to light  Cardiovascular:      Rate and Rhythm: Normal rate and regular rhythm  Pulses: Normal pulses        Heart sounds: Normal heart sounds  No murmur heard  Pulmonary:      Effort: Pulmonary effort is normal  No respiratory distress  Breath sounds: Normal breath sounds  No wheezing, rhonchi or rales  Comments: Diminished breath sounds with poor effort  Abdominal:      General: Bowel sounds are normal  There is no distension  Palpations: Abdomen is soft  There is no mass  Tenderness: There is no abdominal tenderness  Hernia: No hernia is present  Comments: PEG tube in place and running   No suprapubic tenderness noted   Musculoskeletal:         General: Swelling (Left upper extremity) present  No tenderness  Right lower leg: Edema ( moderate nonpitting) present  Left lower leg: Edema ( moderate nonpitting) present  Lymphadenopathy:      Cervical: No cervical adenopathy  Skin:     General: Skin is warm and dry  Coloration: Skin is not pale  Findings: Bruising ( bilateral upper extremities various stages of healing) present  No rash  Neurological:      General: No focal deficit present  Mental Status: She is alert  Mental status is at baseline  She is disoriented  Motor: Weakness present  Gait: Gait abnormal       Comments: A how X 1   Forgetful and confused   Answers yes/no     Psychiatric:         Mood and Affect: Mood normal          Behavior: Behavior normal          Pertinent Laboratory/Diagnostic Studies:   Reviewed in facility chart-stable      Current Medications   Medications reviewed and updated see facility [facility] for details  Current Outpatient Medications:     acetaminophen (TYLENOL) 325 mg tablet, Take 650 mg by mouth every 6 (six) hours as needed, Disp: , Rfl:     collagenase (SANTYL) ointment, Apply 1 application topically daily, Disp: , Rfl:     multivitamin (THERAGRAN) TABS, Take 1 tablet by mouth daily, Disp: , Rfl:     Eliquis 5 MG, Hold medication today, 6/30/2022 and tomorrow, 7/1/2022, Disp: 30 tablet, Rfl: 3    Skin Protectants, Misc  (PeriGuard) OINT, Apply topically, Disp: , Rfl:      Plan discussed with Dr Lenka Epps noted agreement with assessment and plan  Please note:  Voice-recognition software may have been used in the preparation of this document  Occasional wrong word or "sound-alike" substitutions may have occurred due to the inherent limitations of voice recognition software  Interpretation should be guided by context           JUDIE Healy  7/15/2022  1:43 PM

## 2022-07-17 RX ORDER — ACETAMINOPHEN 325 MG/1
650 TABLET ORAL EVERY 6 HOURS PRN
COMMUNITY

## 2022-07-17 RX ORDER — DIPHENOXYLATE HYDROCHLORIDE AND ATROPINE SULFATE 2.5; .025 MG/1; MG/1
1 TABLET ORAL DAILY
COMMUNITY

## 2022-07-17 NOTE — ASSESSMENT & PLAN NOTE
· Present on admission to inpatient hospital stay   · Wound was not visualized on exam today  · Per review of nursing report there is undermining  · Patient was previously treated with Dakin's rinse/santal   · Offload  · Provide incontinence care  · [place] [facility] CRNP to follow during weekly rounds-appreciate recommendations

## 2022-07-17 NOTE — ASSESSMENT & PLAN NOTE
· Developed while inpatient with tachycardia and leukocytosis   · Unknown etiology  · Chest x-ray negative  · CT abdomen and right upper quadrant ultrasound negative for infectious process   · Urinalysis was not collected  · Clinically patient had improved with leukocytosis resolved   · Patient was treated with IV ceftriaxone and discharged on p o  cefpodoxime  x4 days  · Patient is afebrile vital signs stable on exam today  · Monitor CBC and BMP at [facility]

## 2022-07-17 NOTE — ASSESSMENT & PLAN NOTE
· AA out X 1   · Confused   Provide redirection, reorientation, distraction techniques  Fall Precautions  Assist with ADLs/IADLs  Avoid deliriogenic medications such as tramadol, benzodiazepines, anticholinergics, benadryl  Encourage Hydration/ Nutrition  Implement sleep hygiene, limit night time interuptions   Encourage participation in group activities when appropriate

## 2022-07-17 NOTE — ASSESSMENT & PLAN NOTE
Malnutrition Findings:   · Chronic Illness  · Activity intolerance   · Weight loss   · Reviewed Labs while inpatient -Albumin 1 7 and Protein 5 3  BMI Findings: Body mass index is 23 89 kg/m²       · S/p PEG tube placement 7/2/2022  · Jevity   · Puree with Thin as tolerated   · RD to follow at [facility]

## 2022-07-18 ENCOUNTER — HOSPITAL ENCOUNTER (INPATIENT)
Facility: HOSPITAL | Age: 87
LOS: 3 days | Discharge: NURSING, REHAB OR LONG-TERM CARE | DRG: 871 | End: 2022-07-22
Attending: EMERGENCY MEDICINE | Admitting: INTERNAL MEDICINE
Payer: MEDICARE

## 2022-07-18 ENCOUNTER — APPOINTMENT (EMERGENCY)
Dept: ULTRASOUND IMAGING | Facility: HOSPITAL | Age: 87
DRG: 871 | End: 2022-07-18
Payer: MEDICARE

## 2022-07-18 ENCOUNTER — TELEPHONE (OUTPATIENT)
Dept: OTHER | Facility: OTHER | Age: 87
End: 2022-07-18

## 2022-07-18 DIAGNOSIS — K92.2 UGI BLEED: ICD-10-CM

## 2022-07-18 DIAGNOSIS — R10.11 RUQ PAIN: ICD-10-CM

## 2022-07-18 DIAGNOSIS — K80.00 ACUTE CHOLECYSTITIS DUE TO BILIARY CALCULUS: ICD-10-CM

## 2022-07-18 DIAGNOSIS — L89.159 SACRAL DECUBITUS ULCER: ICD-10-CM

## 2022-07-18 DIAGNOSIS — E46 MALNUTRITION (HCC): ICD-10-CM

## 2022-07-18 DIAGNOSIS — R79.89 ELEVATED LFTS: ICD-10-CM

## 2022-07-18 DIAGNOSIS — K92.0 HEMATEMESIS, UNSPECIFIED WHETHER NAUSEA PRESENT: Primary | ICD-10-CM

## 2022-07-18 LAB
ABO GROUP BLD: NORMAL
ALBUMIN SERPL BCP-MCNC: 2.3 G/DL (ref 3.5–5)
ALP SERPL-CCNC: 288 U/L (ref 34–104)
ALT SERPL W P-5'-P-CCNC: 238 U/L (ref 7–52)
ANION GAP SERPL CALCULATED.3IONS-SCNC: 6 MMOL/L (ref 4–13)
AST SERPL W P-5'-P-CCNC: 633 U/L (ref 13–39)
BASOPHILS # BLD AUTO: 0.06 THOUSANDS/ΜL (ref 0–0.1)
BASOPHILS NFR BLD AUTO: 1 % (ref 0–1)
BILIRUB SERPL-MCNC: 0.7 MG/DL (ref 0.2–1)
BLD GP AB SCN SERPL QL: NEGATIVE
BUN SERPL-MCNC: 20 MG/DL (ref 5–25)
CALCIUM ALBUM COR SERPL-MCNC: 9.8 MG/DL (ref 8.3–10.1)
CALCIUM SERPL-MCNC: 8.4 MG/DL (ref 8.4–10.2)
CHLORIDE SERPL-SCNC: 98 MMOL/L (ref 96–108)
CO2 SERPL-SCNC: 29 MMOL/L (ref 21–32)
CREAT SERPL-MCNC: 0.31 MG/DL (ref 0.6–1.3)
EOSINOPHIL # BLD AUTO: 0.02 THOUSAND/ΜL (ref 0–0.61)
EOSINOPHIL NFR BLD AUTO: 0 % (ref 0–6)
ERYTHROCYTE [DISTWIDTH] IN BLOOD BY AUTOMATED COUNT: 18.3 % (ref 11.6–15.1)
GFR SERPL CREATININE-BSD FRML MDRD: 103 ML/MIN/1.73SQ M
GLUCOSE SERPL-MCNC: 116 MG/DL (ref 65–140)
HCT VFR BLD AUTO: 34.5 % (ref 34.8–46.1)
HGB BLD-MCNC: 11.3 G/DL (ref 11.5–15.4)
IMM GRANULOCYTES # BLD AUTO: 0.18 THOUSAND/UL (ref 0–0.2)
IMM GRANULOCYTES NFR BLD AUTO: 2 % (ref 0–2)
LYMPHOCYTES # BLD AUTO: 1.4 THOUSANDS/ΜL (ref 0.6–4.47)
LYMPHOCYTES NFR BLD AUTO: 12 % (ref 14–44)
MCH RBC QN AUTO: 32.8 PG (ref 26.8–34.3)
MCHC RBC AUTO-ENTMCNC: 32.8 G/DL (ref 31.4–37.4)
MCV RBC AUTO: 100 FL (ref 82–98)
MONOCYTES # BLD AUTO: 0.7 THOUSAND/ΜL (ref 0.17–1.22)
MONOCYTES NFR BLD AUTO: 6 % (ref 4–12)
NEUTROPHILS # BLD AUTO: 9.81 THOUSANDS/ΜL (ref 1.85–7.62)
NEUTS SEG NFR BLD AUTO: 79 % (ref 43–75)
NRBC BLD AUTO-RTO: 0 /100 WBCS
PLATELET # BLD AUTO: 527 THOUSANDS/UL (ref 149–390)
PMV BLD AUTO: 9.4 FL (ref 8.9–12.7)
POTASSIUM SERPL-SCNC: 3.5 MMOL/L (ref 3.5–5.3)
PROT SERPL-MCNC: 5.3 G/DL (ref 6.4–8.4)
RBC # BLD AUTO: 3.45 MILLION/UL (ref 3.81–5.12)
RH BLD: POSITIVE
SODIUM SERPL-SCNC: 133 MMOL/L (ref 135–147)
SPECIMEN EXPIRATION DATE: NORMAL
WBC # BLD AUTO: 12.17 THOUSAND/UL (ref 4.31–10.16)

## 2022-07-18 PROCEDURE — 96366 THER/PROPH/DIAG IV INF ADDON: CPT

## 2022-07-18 PROCEDURE — 99285 EMERGENCY DEPT VISIT HI MDM: CPT | Performed by: EMERGENCY MEDICINE

## 2022-07-18 PROCEDURE — 76705 ECHO EXAM OF ABDOMEN: CPT

## 2022-07-18 PROCEDURE — 80053 COMPREHEN METABOLIC PANEL: CPT

## 2022-07-18 PROCEDURE — 96375 TX/PRO/DX INJ NEW DRUG ADDON: CPT

## 2022-07-18 PROCEDURE — 99285 EMERGENCY DEPT VISIT HI MDM: CPT

## 2022-07-18 PROCEDURE — 36415 COLL VENOUS BLD VENIPUNCTURE: CPT

## 2022-07-18 PROCEDURE — 99223 1ST HOSP IP/OBS HIGH 75: CPT | Performed by: INTERNAL MEDICINE

## 2022-07-18 PROCEDURE — 96365 THER/PROPH/DIAG IV INF INIT: CPT

## 2022-07-18 PROCEDURE — 86900 BLOOD TYPING SEROLOGIC ABO: CPT

## 2022-07-18 PROCEDURE — 96368 THER/DIAG CONCURRENT INF: CPT

## 2022-07-18 PROCEDURE — 86901 BLOOD TYPING SEROLOGIC RH(D): CPT

## 2022-07-18 PROCEDURE — 86850 RBC ANTIBODY SCREEN: CPT

## 2022-07-18 PROCEDURE — 93005 ELECTROCARDIOGRAM TRACING: CPT

## 2022-07-18 PROCEDURE — C9113 INJ PANTOPRAZOLE SODIUM, VIA: HCPCS

## 2022-07-18 PROCEDURE — 85025 COMPLETE CBC W/AUTO DIFF WBC: CPT

## 2022-07-18 RX ORDER — CEFTRIAXONE 1 G/50ML
1000 INJECTION, SOLUTION INTRAVENOUS ONCE
Status: COMPLETED | OUTPATIENT
Start: 2022-07-18 | End: 2022-07-18

## 2022-07-18 RX ORDER — PANTOPRAZOLE SODIUM 40 MG/10ML
40 INJECTION, POWDER, LYOPHILIZED, FOR SOLUTION INTRAVENOUS ONCE
Status: COMPLETED | OUTPATIENT
Start: 2022-07-18 | End: 2022-07-18

## 2022-07-18 RX ADMIN — SODIUM CHLORIDE, SODIUM LACTATE, POTASSIUM CHLORIDE, AND CALCIUM CHLORIDE 500 ML: .6; .31; .03; .02 INJECTION, SOLUTION INTRAVENOUS at 21:10

## 2022-07-18 RX ADMIN — PANTOPRAZOLE SODIUM 40 MG: 40 INJECTION, POWDER, FOR SOLUTION INTRAVENOUS at 21:11

## 2022-07-18 RX ADMIN — CEFTRIAXONE 1000 MG: 1 INJECTION, SOLUTION INTRAVENOUS at 23:28

## 2022-07-18 NOTE — TELEPHONE ENCOUNTER
Patient is throwing up dark red, coffe ground like blood   Paged senior care on call Message was read

## 2022-07-19 PROBLEM — K92.0 COFFEE GROUND EMESIS: Status: ACTIVE | Noted: 2022-07-19

## 2022-07-19 PROBLEM — K80.00 ACUTE CHOLECYSTITIS DUE TO BILIARY CALCULUS: Status: ACTIVE | Noted: 2022-07-19

## 2022-07-19 PROBLEM — F03.90 DEMENTIA WITHOUT BEHAVIORAL DISTURBANCE (HCC): Status: ACTIVE | Noted: 2021-07-29

## 2022-07-19 LAB — LACTATE SERPL-SCNC: 1.4 MMOL/L (ref 0.5–2)

## 2022-07-19 PROCEDURE — 99223 1ST HOSP IP/OBS HIGH 75: CPT | Performed by: SURGERY

## 2022-07-19 PROCEDURE — 83605 ASSAY OF LACTIC ACID: CPT | Performed by: EMERGENCY MEDICINE

## 2022-07-19 PROCEDURE — 99232 SBSQ HOSP IP/OBS MODERATE 35: CPT | Performed by: INTERNAL MEDICINE

## 2022-07-19 PROCEDURE — 99222 1ST HOSP IP/OBS MODERATE 55: CPT | Performed by: INTERNAL MEDICINE

## 2022-07-19 PROCEDURE — 36415 COLL VENOUS BLD VENIPUNCTURE: CPT | Performed by: EMERGENCY MEDICINE

## 2022-07-19 PROCEDURE — 87154 CUL TYP ID BLD PTHGN 6+ TRGT: CPT | Performed by: EMERGENCY MEDICINE

## 2022-07-19 PROCEDURE — 97163 PT EVAL HIGH COMPLEX 45 MIN: CPT

## 2022-07-19 PROCEDURE — 87040 BLOOD CULTURE FOR BACTERIA: CPT | Performed by: EMERGENCY MEDICINE

## 2022-07-19 PROCEDURE — C9113 INJ PANTOPRAZOLE SODIUM, VIA: HCPCS

## 2022-07-19 RX ORDER — SODIUM CHLORIDE, SODIUM LACTATE, POTASSIUM CHLORIDE, CALCIUM CHLORIDE 600; 310; 30; 20 MG/100ML; MG/100ML; MG/100ML; MG/100ML
75 INJECTION, SOLUTION INTRAVENOUS CONTINUOUS
Status: DISCONTINUED | OUTPATIENT
Start: 2022-07-19 | End: 2022-07-20

## 2022-07-19 RX ORDER — ACETAMINOPHEN 325 MG/1
650 TABLET ORAL EVERY 6 HOURS PRN
Status: DISCONTINUED | OUTPATIENT
Start: 2022-07-19 | End: 2022-07-22 | Stop reason: HOSPADM

## 2022-07-19 RX ORDER — METOPROLOL TARTRATE 5 MG/5ML
2.5 INJECTION INTRAVENOUS EVERY 6 HOURS PRN
Status: DISCONTINUED | OUTPATIENT
Start: 2022-07-19 | End: 2022-07-22 | Stop reason: HOSPADM

## 2022-07-19 RX ORDER — ACETAMINOPHEN 325 MG/1
975 TABLET ORAL EVERY 8 HOURS SCHEDULED
Status: DISCONTINUED | OUTPATIENT
Start: 2022-07-19 | End: 2022-07-22 | Stop reason: HOSPADM

## 2022-07-19 RX ORDER — SODIUM CHLORIDE 9 MG/ML
100 INJECTION, SOLUTION INTRAVENOUS CONTINUOUS
Status: DISCONTINUED | OUTPATIENT
Start: 2022-07-19 | End: 2022-07-19

## 2022-07-19 RX ORDER — HYDROMORPHONE HCL IN WATER/PF 6 MG/30 ML
0.2 PATIENT CONTROLLED ANALGESIA SYRINGE INTRAVENOUS EVERY 4 HOURS PRN
Status: DISCONTINUED | OUTPATIENT
Start: 2022-07-19 | End: 2022-07-22 | Stop reason: HOSPADM

## 2022-07-19 RX ORDER — METRONIDAZOLE 500 MG/100ML
500 INJECTION, SOLUTION INTRAVENOUS EVERY 8 HOURS
Status: DISCONTINUED | OUTPATIENT
Start: 2022-07-19 | End: 2022-07-22 | Stop reason: HOSPADM

## 2022-07-19 RX ORDER — CEFTRIAXONE 1 G/50ML
1000 INJECTION, SOLUTION INTRAVENOUS EVERY 24 HOURS
Status: DISCONTINUED | OUTPATIENT
Start: 2022-07-19 | End: 2022-07-22 | Stop reason: HOSPADM

## 2022-07-19 RX ORDER — OXYCODONE HYDROCHLORIDE 5 MG/1
2.5 TABLET ORAL EVERY 4 HOURS PRN
Status: DISCONTINUED | OUTPATIENT
Start: 2022-07-19 | End: 2022-07-22 | Stop reason: HOSPADM

## 2022-07-19 RX ORDER — OXYCODONE HYDROCHLORIDE 5 MG/1
5 TABLET ORAL EVERY 4 HOURS PRN
Status: DISCONTINUED | OUTPATIENT
Start: 2022-07-19 | End: 2022-07-22 | Stop reason: HOSPADM

## 2022-07-19 RX ADMIN — SODIUM CHLORIDE 8 MG/HR: 9 INJECTION, SOLUTION INTRAVENOUS at 03:28

## 2022-07-19 RX ADMIN — METRONIDAZOLE 500 MG: 500 INJECTION, SOLUTION INTRAVENOUS at 18:29

## 2022-07-19 RX ADMIN — COLLAGENASE SANTYL 1 APPLICATION: 250 OINTMENT TOPICAL at 12:00

## 2022-07-19 RX ADMIN — METRONIDAZOLE 500 MG: 500 INJECTION, SOLUTION INTRAVENOUS at 10:59

## 2022-07-19 RX ADMIN — CEFTRIAXONE 1000 MG: 1 INJECTION, SOLUTION INTRAVENOUS at 23:12

## 2022-07-19 RX ADMIN — METRONIDAZOLE 500 MG: 500 INJECTION, SOLUTION INTRAVENOUS at 02:50

## 2022-07-19 RX ADMIN — SODIUM CHLORIDE 8 MG/HR: 9 INJECTION, SOLUTION INTRAVENOUS at 13:49

## 2022-07-19 RX ADMIN — SODIUM CHLORIDE, SODIUM LACTATE, POTASSIUM CHLORIDE, AND CALCIUM CHLORIDE 100 ML/HR: .6; .31; .03; .02 INJECTION, SOLUTION INTRAVENOUS at 05:17

## 2022-07-19 NOTE — ASSESSMENT & PLAN NOTE
Patient is an 81year-old with history of atrial fibrillation on elliquis, dementia, has PEG tube in for severe calorie malnutrition, who was brought to the ED for dark brown emesis with some present in the PEG tube  Physical examination showed she was tachycardic, afebrile, with right upper tenderness  Blood work revealed leucocytosis with neutrophilic predominance, elevated liver enzymes  Ultrasound of the abdomen showed, gall stone with slurge, distended gall bladder with bladder wall thickening, sonographic sawant's sound which are alll consistent with acute cholecystitis    Plan  -Surgery consulted patient- advice conservative management with antibiotics, if no response then laparoscopic cholecystectomy will be performed   -Antibiotics;  Ceftiaxone 1g, IV every 24 hours, Metronidazole 500 mg IV, every 8 hours  -Hydration, Ringer lactate 100cc/hour   -Geriatric pain management   Acetaminophen 975 mg, oral every 8 hours  Oxycodone 2 5 mg oral, every 4 hours for moderate pain  Oxycodone 5 mg oral, every 4 hours for severe pain  Hydromorphone 0 2mg IV every 4 hours for breakthrough pain  -NPO  -Monitor vital signs

## 2022-07-19 NOTE — ED PROVIDER NOTES
History  Chief Complaint   Patient presents with    Vomiting     Patient presents via EMS for one episode of vomiting with blood in it  Patient had feeding tube placed two days ago  +blood thinners     HPI   Patient is an 81-year-old female with history of dementia, AFib, DVT on Eliquis, stage IV sacral wound presenting via EMS from nursing facility for dark colored, coffee-ground appearing emesis  According to EMS, this began today  Patient was recently admitted for PEG tube placement after she was found to have severe malnutrition  Peg tube was placed on July 2nd by GI with EGD  ROS was complicated by dementia, pt responding "I don't know" to many questions  Pt did explicitly state "no" when asked if she was having any chest pain, shortness of breath, dizziness  Prior to Admission Medications   Prescriptions Last Dose Informant Patient Reported? Taking? Eliquis 5 MG   No No   Sig: Hold medication today, 6/30/2022 and tomorrow, 7/1/2022   Skin Protectants, Misc  (PeriGuard) OINT   Yes No   Sig: Apply topically   acetaminophen (TYLENOL) 325 mg tablet   Yes No   Sig: Take 650 mg by mouth every 6 (six) hours as needed   collagenase (SANTYL) ointment   Yes No   Sig: Apply 1 application topically daily   multivitamin (THERAGRAN) TABS   Yes No   Sig: Take 1 tablet by mouth daily      Facility-Administered Medications: None       Past Medical History:   Diagnosis Date    A-fib ([facility] )     Dementia ([place] [address] )     DVT (deep venous thrombosis) ([place] [address] )     Hypertension     Pressure ulcer of sacral region        History reviewed  No pertinent surgical history  History reviewed  No pertinent family history  I have reviewed and agree with the history as documented      E-Cigarette/Vaping    E-Cigarette Use Never User      E-Cigarette/Vaping Substances    Nicotine No     THC No     CBD No     Flavoring No     Other No     Unknown No      Social History     Tobacco Use    Smoking status: Never Smoker    Smokeless tobacco: Never Used   Vaping Use    Vaping Use: Never used   Substance Use Topics    Alcohol use: Not Currently    Drug use: Never        Review of Systems   Unable to perform ROS: Dementia       Physical Exam  ED Triage Vitals   Temperature Pulse Respirations Blood Pressure SpO2   07/18/22 2008 07/18/22 2010 07/18/22 2008 07/18/22 2008 07/18/22 2008   98 1 °F (36 7 °C) (!) 111 20 113/76 98 %      Temp Source Heart Rate Source Patient Position - Orthostatic VS BP Location FiO2 (%)   07/18/22 2008 07/18/22 2010 07/18/22 2008 07/18/22 2008 --   Oral Monitor Lying Right arm       Pain Score       --                    Orthostatic Vital Signs  Vitals:    07/18/22 2008 07/18/22 2010   BP: 113/76    Pulse:  (!) 111   Patient Position - Orthostatic VS: Lying        Physical Exam  Vitals and nursing note reviewed  Constitutional:       General: She is not in acute distress  Appearance: She is well-developed  Comments: Tired appearing   HENT:      Head: Normocephalic and atraumatic  Comments: Dried brown material on left side of face     Nose: Nose normal       Mouth/Throat:      Mouth: Mucous membranes are moist    Eyes:      Conjunctiva/sclera: Conjunctivae normal    Cardiovascular:      Rate and Rhythm: Normal rate and regular rhythm  Heart sounds: No murmur heard  Pulmonary:      Effort: Pulmonary effort is normal  No respiratory distress  Breath sounds: Normal breath sounds  Abdominal:      General: Abdomen is flat  There is no distension  Palpations: Abdomen is soft  Tenderness: There is abdominal tenderness  Comments: Mildly tender in LUQ  PEG tube in place without leaking, bleeding, any surrounding redness  Able to be flushed and aspirated dark-brown, coffee ground appearing contents  Genitourinary:     Comments: Rectal exam declined  Musculoskeletal:         General: No swelling or deformity  Cervical back: Neck supple  Skin:     General: Skin is warm and dry  ED Medications  Medications   lactated ringers bolus 500 mL (500 mL Intravenous New Bag 7/18/22 2110)   pantoprazole (PROTONIX) injection 40 mg (40 mg Intravenous Given 7/18/22 2111)       Diagnostic Studies  Results Reviewed     Procedure Component Value Units Date/Time    Comprehensive metabolic panel [[id number]]  (Abnormal) Collected: 07/18/22 2110    Lab Status: Final result Specimen: Blood from Arm, Left Updated: 07/18/22 2142     Sodium 133 mmol/L      Potassium 3 5 mmol/L      Chloride 98 mmol/L      CO2 29 mmol/L      ANION GAP 6 mmol/L      BUN 20 mg/dL      Creatinine 0 31 mg/dL      Glucose 116 mg/dL      Calcium 8 4 mg/dL      Corrected Calcium 9 8 mg/dL       U/L       U/L      Alkaline Phosphatase 288 U/L      Total Protein 5 3 g/dL      Albumin 2 3 g/dL      Total Bilirubin 0 70 mg/dL      eGFR 103 ml/min/1 73sq m     Narrative:      National Kidney Disease Foundation guidelines for Chronic Kidney Disease (CKD):     Stage 1 with normal or high GFR (GFR > 90 mL/min/1 73 square meters)    Stage 2 Mild CKD (GFR = 60-89 mL/min/1 73 square meters)    Stage 3A Moderate CKD (GFR = 45-59 mL/min/1 73 square meters)    Stage 3B Moderate CKD (GFR = 30-44 mL/min/1 73 square meters)    Stage 4 Severe CKD (GFR = 15-29 mL/min/1 73 square meters)    Stage 5 End Stage CKD (GFR <15 mL/min/1 73 square meters)  Note: GFR calculation is accurate only with a steady state creatinine    CBC and differential [[id number]]  (Abnormal) Collected: 07/18/22 2110    Lab Status: Final result Specimen: Blood from Arm, Left Updated: 07/18/22 2119     WBC 12 17 Thousand/uL      RBC 3 45 Million/uL      Hemoglobin 11 3 g/dL      Hematocrit 34 5 %       fL      MCH 32 8 pg      MCHC 32 8 g/dL      RDW 18 3 %      MPV 9 4 fL      Platelets 763 Thousands/uL      nRBC 0 /100 WBCs      Neutrophils Relative 79 %      Immat GRANS % 2 %      Lymphocytes Relative 12 %      Monocytes Relative 6 %      Eosinophils Relative 0 %      Basophils Relative 1 %      Neutrophils Absolute 9 81 Thousands/µL      Immature Grans Absolute 0 18 Thousand/uL      Lymphocytes Absolute 1 40 Thousands/µL      Monocytes Absolute 0 70 Thousand/µL      Eosinophils Absolute 0 02 Thousand/µL      Basophils Absolute 0 06 Thousands/µL                  US right upper quadrant   Final Result by Miri Arroyo DO (07/18 2235)      Again noted is a large gallstone and sludge within a distended gallbladder  There is mild gallbladder wall thickening  Sonographic Benítez sign reported as positive  Findings are suggestive of acute cholecystitis  Mild hepatomegaly  The study was marked in [facility] for immediate notification  Workstation performed: [id number]               Procedures  Procedures      ED Course  ED Course as of 07/18/22 2246 Mon Jul 18, 2022   2106 Pt with dark, coffee ground emesis at nursing facility  On evaluation at bedside, peg tube flushed with sterile saline and suctioned out dark, coffee-ground appearing stomach contents  Testing with hemoccult card is positive  Can obtain CBC, CMP, type and screen, EKG  IV PPI given  2114 EKG interpretation:  Sinus tachycardia, rate 113 beats per minute  Occasional PVCs   milliseconds  No acute ST-T-wave abnormality  Artifact noted  No prior for comparison  2141 Hemoglobin(!): 11 3  Noted - higher than previous CBC while inpatient  No emergent transfusion warranted at this time  2146 AST(!): 633   2223 Alkaline Phosphatase(!): 288   2223 ALT(!): 238  Noted RUQ u/s with large gallstones, sludge  New RUQ US ordered in setting of abd tenderness, vomiting, new transaminitis   2245 Right upper quadrant ultrasound pending at this time  Care fully transferred to attending physician at this time for continued workup and disposition             MDM    Disposition  Final diagnoses:   Hematemesis, unspecified whether nausea present     Time reflects when diagnosis was documented in both MDM as applicable and the Disposition within this note     Time User Action Codes Description Comment    7/18/2022  9:36 PM Neptali Kelly Add [K92 0] Hematemesis, unspecified whether nausea present       ED Disposition     None      Follow-up Information    None         Patient's Medications   Discharge Prescriptions    No medications on file     No discharge procedures on file  PDMP Review     None           ED Provider  Attending physically available and evaluated Ananda Port  I managed the patient along with the ED Attending      Electronically Signed by    DO Klever Tatum DO  07/18/22 7770

## 2022-07-19 NOTE — CONSULTS
Consultation -General Surgery  Roya Stock 81 y o  female MRN: [id number]  Unit/Bed#: ED 07 Encounter: [id number]            ASSESSMENT:    Ms Gabriel Carpenter is an 82 yo female who presented with dark colored coffee ground appearing emesis in the setting of acute cholecystitis  The patient had PEG placement on 7/2 and at that time had RUQ US and CT abd/pelvis without contrast  Radiology studies at that time demonstrated 5 3 cm diameter gallstone in the gallbladder along with sludge, however, the most recent presentation also shows + wall thickening and was + for sonographic sawant's sign in conjunction with RUQ and LLQ abdominal pain  WBC was 12 17 and LFTs were elevated, all of which in the setting of an acute cholecystitis etiology  Medical Problems             Problem List     DVT (deep venous thrombosis) (HCC)    Essential hypertension    Dementia with behavioral disturbance (HCC)    Generalized weakness    Severe protein-calorie malnutrition ([facility] )    Malnutrition Findings:                                 BMI Findings: There is no height or weight on file to calculate BMI  Electrolyte disturbance    Stage IV pressure ulcer (HCC)    SIRS (systemic inflammatory response syndrome) (HCC)    Deep tissue injury    Traumatic injury to skin or subcutaneous tissue    Confused                  Plan:  - Recommend admission to [facility] due to the patient's frailty, malnutrition, and multiple medical comorbidity   - We recommend the following:     - IV fluids     - ABX, specifically ceftriaxone + metronidazole     - Make NPO      - F/u with GI regarding potential UGI bleed  Would trend Hgb and get GI recs regarding further workup     - Talk to family about goals of care     - If patient continues to deteriorate despite ABX and observation, will plan for laparoscopic cholecystectomy with possible IOC           Reason for Consult / Principal Problem:    HPI: Roya Stock is a 81y o  year old female who presents with dark colored coffee ground appearing emesis for a period of 1 day  The patient presented to the ED via EMS from a nursing facility  The patient has dementia and is only oriented to person, and therefore a detailed history could not be obtained given the patient is a poor historian and no family assisted the patient to the ED  Based upon information collected by the emergency medicine team and chart review, the patient had a PEG tube placed on 7/2 for poor nutrition  She presented to the ED at that time specifically for EGD and PEG tube placement because she had transportation issues  When she was previously admitted, [facility] demonstrated a gallbladder that was + for stones, sludge, and distension w/o thickening  CBD was not evaluated at that time  On presented to the ED today, RUQ US demonstrated a large stone, + sludge, + wall thickening, + sonographic sawant's sign, and negative for ductal dilation or choledocholithiasis  CBD was 4 0 mm  WBC on today's presentation was 12 17, and the patient was tachycardic at 111  When specifically asked about symptoms and medical history, the patient either answers with a "no" or "go away"  Review of Systems   Constitutional: Negative for chills and fever  HENT: Negative for ear pain and sore throat  Eyes: Negative for pain and visual disturbance  Respiratory: Negative for cough and shortness of breath  Cardiovascular: Negative for chest pain and palpitations  Gastrointestinal: Positive for abdominal pain and vomiting  Genitourinary: Negative for dysuria and hematuria  Musculoskeletal: Negative for arthralgias and back pain  Skin: Negative for color change and rash  Neurological: Negative for seizures and syncope  All other systems reviewed and are negative        Historical Information   Past Medical History:   Diagnosis Date    A-fib ([facility] )     Dementia ([facility] )     DVT (deep venous thrombosis) (HCC)     Hypertension     Pressure ulcer of sacral region      History reviewed  No pertinent surgical history  Social History   Social History     Substance and Sexual Activity   Alcohol Use Not Currently     Social History     Substance and Sexual Activity   Drug Use Never     Social History     Tobacco Use   Smoking Status Never Smoker   Smokeless Tobacco Never Used     History reviewed  No pertinent family history  Meds/Allergies     (Not in a hospital admission)    No current facility-administered medications for this encounter  No Known Allergies    Objective     Blood pressure 121/64, pulse 79, temperature 98 1 °F (36 7 °C), temperature source Oral, resp  rate 20, SpO2 96 %  No intake or output data in the 24 hours ending 07/19/22 0126    PHYSICAL EXAM  Physical Exam  Vitals and nursing note reviewed  Constitutional:       General: She is not in acute distress  Appearance: She is well-developed  HENT:      Head: Normocephalic and atraumatic  Eyes:      Conjunctiva/sclera: Conjunctivae normal    Cardiovascular:      Rate and Rhythm: Normal rate and regular rhythm  Heart sounds: No murmur heard  Pulmonary:      Effort: Pulmonary effort is normal  No respiratory distress  Breath sounds: Normal breath sounds  Abdominal:      Tenderness: There is abdominal tenderness in the right upper quadrant and right lower quadrant  Musculoskeletal:      Cervical back: Neck supple  Skin:     General: Skin is warm and dry  Neurological:      Mental Status: She is alert  Lab Results:   I have personally reviewed pertinent lab results    , CBC:   Lab Results   Component Value Date    WBC 12 17 (H) 07/18/2022    HGB 11 3 (L) 07/18/2022    HCT 34 5 (L) 07/18/2022     (H) 07/18/2022     (H) 07/18/2022    MCH 32 8 07/18/2022    MCHC 32 8 07/18/2022    RDW 18 3 (H) 07/18/2022    MPV 9 4 07/18/2022    NRBC 0 07/18/2022   , CMP:   Lab Results   Component Value Date    SODIUM 133 (L) 07/18/2022    K 3 5 07/18/2022 CL 98 07/18/2022    CO2 29 07/18/2022    BUN 20 07/18/2022    CREATININE 0 31 (L) 07/18/2022    CALCIUM 8 4 07/18/2022     (H) 07/18/2022     (H) 07/18/2022    ALKPHOS 288 (H) 07/18/2022    EGFR 103 07/18/2022   , Coagulation: No results found for: PT, INR, APTT, Urinalysis: No results found for: COLORU, CLARITYU, SPECGRAV, PHUR, LEUKOCYTESUR, NITRITE, PROTEINUA, GLUCOSEU, KETONESU, BILIRUBINUR, BLOODU, Amylase: No results found for: AMYLASE, Lipase: No results found for: LIPASE  Imaging: I have personally reviewed pertinent reports  US right upper quadrant    Result Date: 7/18/2022  Impression: Again noted is a large gallstone and sludge within a distended gallbladder  There is mild gallbladder wall thickening  Sonographic Benítez sign reported as positive  Findings are suggestive of acute cholecystitis  Mild hepatomegaly  The study was marked in [facility] for immediate notification  Workstation performed: [id number]     EKG, Pathology, and Other Studies: I have personally reviewed pertinent reports  Counseling / Coordination of Care  Total time spent today  30 minutes  Greater than 50% of total time was spent with the patient and / or family counseling and / or coordination of care           Ashley Ray MD  7/19/2022 1:26 AM

## 2022-07-19 NOTE — ASSESSMENT & PLAN NOTE
Patient is an 81year-old with history of atrial fibrillation on elliquis, dementia, has PEG tube in for severe calorie malnutrition, who was brought to the ED for dark brown emesis with some present in the PEG tube  Physical examination showed she was tachycardic, afebrile, with right upper tenderness  Blood work revealed leucocytosis with neutrophilic predominance, elevated liver enzymes  Ultrasound of the abdomen showed, gall stone with slurge, distended gall bladder with bladder wall thickening, sonographic sawant's sound which are alll consistent with acute cholecystitis  Sepsis, POA (tachycardic at 1:11 a m , leukocytosis of 12 17, possible infectious etiology treated with IV ceftriaxone and Flagyl)    Plan  -Surgery consulted patient- advice conservative management with antibiotics, poor surgical candidate, if no improvement and clinical deterioration surgical team will contact IR for percutaneous cholecystectomy tube placement  -Antibiotics;  Ceftiaxone 1g, IV every 24 hours, Metronidazole 500 mg IV, every 8 hours  -Hydration, Ringer lactate 100cc/hour   -Geriatric pain management   Acetaminophen 975 mg, oral every 8 hours  Oxycodone 2 5 mg oral, every 4 hours for moderate pain  Oxycodone 5 mg oral, every 4 hours for severe pain  Hydromorphone 0 2mg IV every 4 hours for breakthrough pain  -NPO transitioned to clear liquid diet  -GI evaluated the patient and stated no indication for ERCP, may consider EGD if Hgb drops and patient has signs of active bleeding  -Had some episodes of elevated heart rates to the 120s, may be 2/2 to pain and setting of sepsis  -Continue metoprolol PRN q 6 hrs if HR > 729, hold if systolic BP < 90  -Monitor vital signs

## 2022-07-19 NOTE — ASSESSMENT & PLAN NOTE
Patient with Atrial  fibrillation on eliquis, no recent use of NSAID, present with dark brown emesis, part in the PEG tube  No documenation of dizziness or syncope  Patient is conscious although demented, no other visible sources of bleeding, rectal exam denied at ER   Hb was 11 2  rule out an upper GI bleed as complication of PEG tube  Differential; esophagitis or gastritis    Plan  Gastroenterology recommends holding off on EGD for now unless Hgb drops and signs of obvious bleeding present  Diet: Tube feeds with no orals  Continue pantoprazole 40 mg iv drip  Ringers lactate 100cc/hour  Continue to monitor CBC  Continue to hold eliquis

## 2022-07-19 NOTE — ASSESSMENT & PLAN NOTE
Patient with Atrial  fibrillation on eliquis, no recent use of NSAID, present with dark brown emesis, part in the PEG tube  No documenation of dizziness or syncope  Patient is conscious although demented, no other visible sources of bleeding, rectal exam denied at ER   Hb was 11 2  rule out an upper GI bleed as complication of PEG tube  Differential; esophagitis or gastritis    Plan  Gastroenterology consult  NPO  Pantoprazole 40 mg iv drip  Ringers lactate 100cc/hour  Serial Hg and HCT checks every 8 hours

## 2022-07-19 NOTE — ASSESSMENT & PLAN NOTE
Continue care plan    Skin care plans:  1-Hydraguard to bilateral buttock and heels BID and PRN  2-Elevate heels to offload pressure  3-Ehob cushion in chair when out of bed  4-Moisturize skin daily with skin nourishing cream   5-Turn/reposition q2h for pressure re-distribution on skin    6-Mid sacral wound- Cleanse wound with Dakin's solution  Then rinse with NSS, pat dry  Apply nickel thick layer of santyl to wound bed, then pack wound lightly with plain packing ribbon  Cover with 4x4, and then Allevyn foam dressing  Change daily and as needed for soilage/dislodgment  7-Left hip and right heel- Cleanse wounds with soap and water, pat dry  Apply Allevyn foam dressing over wounds  Edwin w/T and date  Change every 3 days and as needed  Peel back and check skin integrity every shift   8-P-500 low air loss mattress    Wound care will follow

## 2022-07-19 NOTE — CONSULTS
Consultation - [facility]  Douglas Awad 81 y o  female MRN: [id number]  Unit/Bed#: W -01 Encounter: [id number]        Inpatient consult to gastroenterology  Consult performed by: Rigo May MD  Consult ordered by: Riddhi Collins MD          Reason for Consult / Principal Problem:  Coffee-ground emesis and received 12 from the PEG tube    ASSESSMENT and PLAN:    Principal Problem:    Acute cholecystitis due to biliary calculus  Active Problems:    Dementia without behavioral disturbance (HCC)    Stage IV pressure ulcer (HCC)    Coffee ground emesis      1  Acute cholecystitis due to biliary calculus  - Pt presented with dark brown emesis at nursing facility   - General surgery consulted and recommendations appreciated  - Right upper tenderness + on physical exam on admission   - Patient has a high risks of surgery, does will continue antibiotic at this time    - No plan for ERCP at present  2  Coffee-ground emesis  - patient presented with coffee-ground emesis  - no overnight event and coffee-ground emesis  - hemoglobin stable at 11 3 this morning   - Monitor symptoms and will consider EGD if Hb drops or has signs of overt bleeding  3  Tansaminitis     Lab Results   Component Value Date     (H) 07/18/2022    AST 37 07/11/2022     (H) 07/18/2022    ALT 16 07/11/2022    ALKPHOS 288 (H) 07/18/2022    ALKPHOS 60 07/11/2022    TBILI 0 70 07/18/2022    TBILI 0 47 07/11/2022        - Elevated liver enzymes and alkaline phosphatase in the setting of acute cholecystitis  - Differnetials : Viral hepatitis, autoimmune hepatitis, medication side effect, alcoholic hepatitis ( less likely)     Summary of recommendation  - continue feeding tube care  - continue clear liquid diet    - continue antibiotics Rocephin and Flagyl  - monitor hemoglobin and symptoms   - Patient has a high risks of surgery, thus will continue conservative management at this time    - No plan for ERCP at present  - Monitor symptoms and will consider EGD if Hb drops or has signs of overt bleeding        --------------------------------------------------------------------------------------------------------  HPI: An 81year-old with past medical history of atrial fibrillation on elliquis, dementia, has PEG tube in for severe calorie malnutrition, who was brought to the ED for dark brown emesis at nursing facility with some present in the PEG tube  Pt is very demented, not oriented to herself, unable to get history from patient directly, non cooperative, refused to answer most questions, no family member was present  Thus, most of the history is from chart review  She has a PEG tube placed on 7/1/2022 for severe  protein calorie malnutrition  Abdominal Ultrasound done a the ED  yesterday showed a large gallstone and sludge within a distended gallbladder, mild gall bladder wall thickening and sonographic sawant's sign positive  Blood work revealed leucocytosis with neutrophilic predominance, elevated liver enzymes  WBC 543219, with neutrophil predominance,  Hg 11 3, blood cultures pending  She was placed on antibiotics ceftriaxone, metronidazole and IV fluids  General surgery consulted and recommended non-operative management at present time due to higher risk for surgery  Pt is monitored with conservative management with antibiotics ( Ceftiaxone 1g, IV every 24 hours, Metronidazole 500 mg IV, every 8 hours) currently, if no response then laparoscopic cholecystectomy will be performed  Home [facility] [place], operated by [facility] is on hold  REVIEW OF SYSTEMS:    CONSTITUTIONAL:  Patient is demented and refusing history taking and physical examination  Patient was alert but not oriented at all  Unable to do review of systems due to acuity of condition        Historical Information   Past Medical History:   Diagnosis Date    A-fib ([facility] )     Dementia ([facility] )     DVT (deep venous thrombosis) ([facility] )     Hypertension  Pressure ulcer of sacral region      History reviewed  No pertinent surgical history  Social History   Social History     Substance and Sexual Activity   Alcohol Use Not Currently     Social History     Substance and Sexual Activity   Drug Use Never     Social History     Tobacco Use   Smoking Status Never Smoker   Smokeless Tobacco Never Used     History reviewed  No pertinent family history  Meds/Allergies     Medications Prior to Admission   Medication    acetaminophen (TYLENOL) 325 mg tablet    collagenase (SANTYL) ointment    Eliquis 5 MG    multivitamin (THERAGRAN) TABS    Skin Protectants, Misc  (PeriGuard) OINT     Current Facility-Administered Medications   Medication Dose Route Frequency    acetaminophen (TYLENOL) tablet 650 mg  650 mg Oral Q6H PRN    acetaminophen (TYLENOL) tablet 975 mg  975 mg Oral Q8H [address]    cefTRIAXone (ROCEPHIN) IVPB (premix in dextrose) 1,000 mg 50 mL  1,000 mg Intravenous Q24H    collagenase (SANTYL) ointment 1 application  1 application Topical Daily    HYDROmorphone HCl (DILAUDID) injection 0 2 mg  0 2 mg Intravenous Q4H PRN    lactated ringers infusion  75 mL/hr Intravenous Continuous    metoprolol (LOPRESSOR) injection 2 5 mg  2 5 mg Intravenous Q6H PRN    metroNIDAZOLE (FLAGYL) IVPB (premix) 500 mg 100 mL  500 mg Intravenous Q8H    multivitamin stress formula tablet 1 tablet  1 tablet Oral Daily    oxyCODONE (ROXICODONE) IR tablet 2 5 mg  2 5 mg Oral Q4H PRN    oxyCODONE (ROXICODONE) IR tablet 5 mg  5 mg Oral Q4H PRN    pantoprazole (PROTONIX) 80 mg in sodium chloride 0 9 % 100 mL infusion  8 mg/hr Intravenous Continuous       No Known Allergies        Objective     Blood pressure 121/74, pulse (!) 121, temperature 98 6 °F (37 °C), resp  rate 18, SpO2 95 %      No intake or output data in the 24 hours ending 07/19/22 1012      PHYSICAL EXAM:      General Appearance:   Alert, not cooperative, no distress, appears stated age    HEENT:   Normocephalic, atraumatic, anicteric, no oropharyngeal thrush present          Lungs:   Patient denied physical exam   Heart[de-identified]   Patient denied physical exam   Abdomen:   Patient denied physical exam   PEG tube in place nice and clean  Genitalia:   Deferred    Rectal:   Deferred    Extremities:  No cyanosis, clubbing or edema    Pulses:      Skin:  No pallor, no jaundice    Lymph nodes:  Patient denied         Lab Results:   Results from last 7 days   Lab Units 07/18/22  2110   WBC Thousand/uL 12 17*   HEMOGLOBIN g/dL 11 3*   HEMATOCRIT % 34 5*   PLATELETS Thousands/uL 527*   NEUTROS PCT % 79*   LYMPHS PCT % 12*   MONOS PCT % 6   EOS PCT % 0     Results from last 7 days   Lab Units 07/18/22  2110   POTASSIUM mmol/L 3 5   CHLORIDE mmol/L 98   CO2 mmol/L 29   BUN mg/dL 20   CREATININE mg/dL 0 31*   CALCIUM mg/dL 8 4   ALK PHOS U/L 288*   ALT U/L 238*   AST U/L 633*               Imaging Studies: I have personally reviewed pertinent imaging studies  US right upper quadrant,  Result Date: 7/18/2022  Impression: Again noted is a large gallstone and sludge within a distended gallbladder  There is mild gallbladder wall thickening  Sonographic Benítez sign reported as positive  Findings are suggestive of acute cholecystitis  Mild hepatomegaly  The study was marked in [facility] for immediate notification  Patient was seen and examined by Dr Bridgett Sauceda  All wheeler medical decisions were made by Dr Bridgett Sauceda  Thank you for allowing us to participate in the care of this present patient  We will follow-up with you closely

## 2022-07-19 NOTE — PLAN OF CARE
Problem: PHYSICAL THERAPY ADULT  Goal: Performs mobility at highest level of function for planned discharge setting  See evaluation for individualized goals  Description: Treatment/Interventions: LE strengthening/ROM, Therapeutic exercise, Cognitive reorientation, Patient/family training, Equipment eval/education, Bed mobility, Endurance training (PT to see for OOB mobility if/when appropriate)          See flowsheet documentation for full assessment, interventions and recommendations  7/19/2022 1455 by Joana Arce PT  Note: Prognosis: Guarded  Problem List: Decreased strength, Decreased mobility, Decreased cognition, Decreased skin integrity, Pain  Assessment: Missael Jain is a 81 y o  Female who [facility]18[facility] w/ c/o vomiting and diagnosis of acute cholecystitis due to biliary calculus  Orders for PT eval and treat received, w/ activity orders of up w/ assist  Comorbidities affecting pt at time of evaluation include: dementia, PEG tube, stave IV pressure ulcer, HTN  Personal factors affecting DC include: inability to navigate level surfaces w/o external assistance, decreased cognition, inability to perform IADLs and inability to perform ADLs  Pt's son reports pt has not ambulated in approx 1 year  Upon evaluation, pt presents w/ the following deficits: weakness, impaired skin integrity, pain limiting functional mobility and limited overall tolerance to mobility  Pt currently requires dependent Ax2 for bed mobility w/ further mobility deferred at this time due to pt's increasing agitation and pt requesting repeatedly to be left alone  Pt's clinical presentation is unstable/unpredictable due to pain impacting overall mobility status, need for input for mobility technique, ongoing medical monitoring/management, abnomal lab values, and recent readmission (within 30 days)   From a PT/mobility standpoint, given the above findings, DC recommendation is: Return to facility w/ skilled PT needs vs no rehab needs pending accurate PLOF  During current admission, pt will benefit from continued skilled inpatient PT in the acute care setting in order to address the above deficits and to maximize function and mobility prior to DC from acute care  PT Discharge Recommendation: Return to facility with rehabilitation services (vs return to facility w/o skilled PT services pending accurate PLOF prior to current admission)    See flowsheet documentation for full assessment

## 2022-07-19 NOTE — ASSESSMENT & PLAN NOTE
Continue care plan    Skin care plans:  1-Hydraguard to bilateral buttock and heels BID and PRN  2-Elevate heels to offload pressure  3-Ehob cushion in chair when out of bed  4-Moisturize skin daily with skin nourishing cream   5-Turn/reposition q2h for pressure re-distribution on skin    6-Mid sacral wound- Cleanse wound with Dakin's solution  Then rinse with NSS, pat dry  Apply nickel thick layer of santyl to wound bed, then pack wound lightly with plain packing ribbon  Cover with 4x4, and then Allevyn foam dressing  Change daily and as needed for soilage/dislodgment  7-Left hip and right heel- Cleanse wounds with soap and water, pat dry  Apply Allevyn foam dressing over wounds  Edwin w/T and date  Change every 3 days and as needed   Peel back and check skin integrity every shift   8-P-500 low air loss mattress

## 2022-07-19 NOTE — CASE MANAGEMENT
Case Management Assessment & Discharge Planning Note    Patient name Liliya Waters  Location W /W -78 MRN [id number]  : 1935 Date 2022       Current Admission Date: 2022  Current Admission Diagnosis:Acute cholecystitis due to biliary calculus   Patient Active Problem List    Diagnosis Date Noted    Acute cholecystitis due to biliary calculus 2022    Coffee ground emesis 2022    Deep tissue injury 2022    Traumatic injury to skin or subcutaneous tissue 2022    Confused 2022    SIRS (systemic inflammatory response syndrome) [facility] ) 2022    Stage IV pressure ulcer ([facility] ) 2022    Severe protein-calorie malnutrition ([facility] ) 2022    Electrolyte disturbance 2022    Generalized weakness 2022    DVT (deep venous thrombosis) ([facility] ) 2022    Essential hypertension 2021    [facility] 2021      LOS (days): 0  Geometric Mean LOS (GMLOS) (days): 4 40  Days to GMLOS:3 7     OBJECTIVE:  PATIENT READMITTED TO HOSPITAL  Risk of Unplanned Readmission Score: 13 24         Current admission status: Inpatient       Preferred Pharmacy:   [address] [place] [address]  Phone: [phone] Fax: [phone]    Primary Care Provider: Annalisa Ozuna    Primary Insurance: MEDICARE  Secondary Insurance: Doug Caldera 118:  [address], [address] [place] [address] Representative - Son   Primary Phone: [phone] (Mobile)                              Patient Information  Admitted from[de-identified] Facility ([facility])  Mental Status: Alert (but did not answer any questions)  During Assessment patient was accompanied by: Michel Aquino)  Assessment information provided by[de-identified] Son  Primary Caregiver:  Other (Comment) (son had been primary caregiver prior to transfer to [facility])  [facility] Name[de-identified] Michael Lewis Relationship to Patient[de-identified] Family Member  Caregiver's Telephone Number[de-identified] [phone]  Support Systems: Son  Living Arrangements: Other (Comment) (at [address] [place] [address] for [employer])    Activities of Daily Living Prior to Admission  Functional Status: Total dependent  Completes ADLs independently?: No  Level of ADL dependence: Total Dependent  Ambulates independently?: No  Level of ambulatory dependence:  Total Dependent  Does the patient have a history of Short-Term Rehab?: Yes ([facility])         Patient Information Continued  Does patient have prescription coverage?: Yes  Within the past 12 months, you worried that your food would run out before you got the money to buy more : Never true  Within the past 12 months, the food you bought just didn't last and you didn't have money to get more : Never true  Food insecurity resource given?: N/A  Does patient receive dialysis treatments?: No  Does patient have a history of substance abuse?: No  Does patient have a history of Mental Health Diagnosis?: No         Means of Transportation  In the past 12 months, has lack of transportation kept you from medical appointments or from getting medications?: No  In the past 12 months, has lack of transportation kept you from meetings, work, or from getting things needed for daily living?: No  Was application for public transport provided?: N/A        DISCHARGE DETAILS:    Discharge planning discussed with[de-identified] patient and son, Jaleel Grajeda, at bedside  [place] of Choice: Yes (re: rehab facility)  Comments - Freedom of Choice: son requesting for patient to return to [address] [place] [address] for STR  CM contacted family/caregiver?: Yes (son, Jaleel Grajeda, at bedside)  Were Treatment Team discharge recommendations reviewed with patient/caregiver?: Yes  Did patient/caregiver verbalize understanding of patient care needs?: N/A- going to facility  Were patient/caregiver advised of the risks associated with not following Treatment Team discharge recommendations?: Yes    Contacts  Patient Contacts: anna Dill  Relationship to Patient[de-identified] Family  Contact Method: In Person  Reason/Outcome: Continuity of Care, Emergency Contact, Referral, Discharge Planning              Other Referral/Resources/Interventions Provided:  Interventions: SNF, Short Term Rehab  Referral Comments: Patient admitted due to acute cholecystitis, nausea/vomiting  Surgery and GI consulted  Met with patient and son, Yumi Bucio, at bedside to discuss d/c plan  Per son, patient had been hospitalized at [facility] and was transferred to [address] [place] [address] after PEG tube was placed  Son reports that he would like for patient to return to [address] [place] [address] to continue rehab at discharge  States that prior to hospitalization at [facility], patient had been living with him and he'd been overseeing her care  Son reports that patient has no POA  Patient had three children, but her youngest son passed away last year  Yumi Bucio is the middle child and lives locally, but he also has an older brother who lives in [place] - reports he's not been involved  Son reports that patient had a similar issue to this in the past - had required a drain be placed for multiple months  Son also with a lot of questions about when patient's tube feeds can be re-started, as he's concerned about patient "starving to death " Encouraged son to speak with MDs regarding plan of care and son aware that referral will be sent to [address] [place] [address] with request to follow  Referral sent in [address] to [address] [place] [address] - awaiting response  Anticipate return to rehab once medically clear      Would you like to participate in our [address] service program?  : No - Declined    Treatment Team Recommendation: Short Term Rehab, SNF  Discharge Destination Plan[de-identified] Short Term Rehab, SNF  Transport at Discharge : BLS Ambulance           CM discussed goals of care with primary provider based on scores of Goals of Care systems list

## 2022-07-19 NOTE — QUICK NOTE
Patient's sepsis present on admission was documented in my assessment and plan for today, as per queries request

## 2022-07-19 NOTE — PHYSICAL THERAPY NOTE
PHYSICAL THERAPY EVALUATION NOTE          Patient Name: Aneesh Downing  WJVGN'N Date: 2022      AGE:   81 y o  Mrn:   [id number]  ADMIT DX:  Vomiting [R11 10]  RUQ pain [R10 11]  UGI bleed [K92 2]  Sacral decubitus ulcer [L89 159]  Elevated LFTs [R79 89]  Hematemesis, unspecified whether nausea present [K92 0]    Past Medical History:  Past Medical History:   Diagnosis Date    A-fib ([facility] )     Dementia ([facility] )     DVT (deep venous thrombosis) ([facility] )     Hypertension     Pressure ulcer of sacral region        Past Surgical History:  History reviewed  No pertinent surgical history  Length Of Stay: 0        PHYSICAL THERAPY EVALUATION:    Patient's identity confirmed via 2 patient identifiers (full name and ) at start of session       22 1359   PT Last Visit   PT Visit Date 22   Note Type   Note type Evaluation   Pain Assessment   Pain Assessment Tool FLACC   Pain Location/Orientation Orientation: Bilateral;Location: Leg   Pain Rating: FLACC (Rest) - Face 1   Pain Rating: FLACC (Rest) - Legs 0   Pain Rating: FLACC (Rest) - Activity 0   Pain Rating: FLACC (Rest) - Cry 0   Pain Rating: FLACC (Rest) - Consolability 0   Score: FLACC (Rest) 1   Pain Rating: FLACC (Activity) - Face 2   Pain Rating: FLACC (Activity) - Legs 1   Pain Rating: FLACC (Activity) - Activity 1   Pain Rating: FLACC (Activity) - Cry 2   Pain Rating: FLACC (Activity) - Consolability 2   Score: FLACC (Activity) 8   Restrictions/Precautions   Weight Bearing Precautions Per Order No   Braces or Orthoses   (bilateral prevalon boots)   Other Precautions Cognitive; Chair Alarm; Bed Alarm;Multiple lines; Fall Risk;Pain;Agitated  (PEG tube)   Home Living   Type of Home SNF   Home Layout One level;Performs ADLs on one level; Able to live on main level with bedroom/bathroom   Additional Comments Pt currently admitted from [place], prior to recent hospitalization (at [facility]) pt resided at [facility] at [address] [facility] assistance with ADLs and functional mobility; Needs assistance with IADLs   Lives With Facility staff   Receives Help From Personal care attendant  (facility staff)   ADL Assistance Needs assistance  (pt's son reports pt required assistance w/ bathing and dressing)   IADLs Needs assistance   Comments Per pt's son, pt has not ambulated in approx 1 year (since having COVID), was sitting at the EOB w/ staff, but has been resistent to staff mobilization at facilities   General   Additional Pertinent History Pt poor historian due to baseline dementia, pt's son present throughout and provided pt's PLOF and previous living environments   Family/Caregiver Present Yes  (pt's son)   Cognition   Overall Cognitive Status Impaired   Arousal/Participation Persistent stimuli required   Orientation Level Oriented to person   Memory Unable to assess   Following Commands Follows one step commands inconsistently   Comments Pt ID via name and , pt resistent to mobility and repositioning  Encouragement and education provided for benefits of repositioning and for linen/pad change to prevent further skin breakdown   RLE Assessment   RLE Assessment   (pt resistant to full MMT and/or PROM)   Strength RLE   R Knee Flexion 2-/5   LLE Assessment   LLE Assessment   (pt resistant to full MMT and/or PROM)   Strength LLE   L Knee Flexion 2-/5   Bed Mobility   Rolling R 1  Dependent   Additional items Assist x 2; Increased time required;Verbal cues;LE management   Rolling L 1  Dependent   Additional items Assist x 2; Increased time required;Verbal cues;LE management   Supine to Sit Unable to assess   Sit to Supine Unable to assess   Additional Comments Pt dependent Ax2 for bilateral rolling bed mobility  Resistent to repositioning and requested repeatedly to be left alone   Further mobility deferred due to pt's increasing agitation   Transfers   Sit to Stand Unable to assess Stand to Sit Unable to assess   Balance   Static Sitting   (unable to assess)   Dynamic Sitting   (unable to assess)   Activity Tolerance   Activity Tolerance Patient limited by pain  (limited to due cognition and increasing agitation)   Medical Staff Made Aware Spoke to [employer] Carlos Montgomery CM to follow up w/ facility regarding pt's PLOF   Nurse Made Aware DAWIT Quach, present and assisted w/ mobility/repositioning   Assessment   Prognosis Guarded   Problem List Decreased strength;Decreased mobility; Decreased cognition;Decreased skin integrity;Pain   Assessment Anitha Rojas is a 81 y o  Female who presents to [address] [place] [address] on 7/18/2022 w/ c/o vomiting and diagnosis of acute cholecystitis due to biliary calculus  Orders for PT eval and treat received, w/ activity orders of up w/ assist  Comorbidities affecting pt at time of evaluation include: dementia, PEG tube, stave IV pressure ulcer, HTN  Personal factors affecting DC include: inability to navigate level surfaces w/o external assistance, decreased cognition, inability to perform IADLs and inability to perform ADLs  Pt's son reports pt has not ambulated in approx 1 year  Upon evaluation, pt presents w/ the following deficits: weakness, impaired skin integrity, pain limiting functional mobility and limited overall tolerance to mobility  Pt currently requires dependent Ax2 for bed mobility w/ further mobility deferred at this time due to pt's increasing agitation and pt requesting repeatedly to be left alone  Pt's clinical presentation is unstable/unpredictable due to pain impacting overall mobility status, need for input for mobility technique, ongoing medical monitoring/management, abnomal lab values, and recent readmission (within 30 days)  From a PT/mobility standpoint, given the above findings, DC recommendation is: Return to facility w/ skilled PT needs vs no rehab needs pending accurate PLOF   During current admission, pt will benefit from continued skilled inpatient PT in the acute care setting in order to address the above deficits and to maximize function and mobility prior to DC from acute care  Goals   Patient Goals "I want to be left alone"   STG Expiration Date 07/29/22   Short Term Goal #1 Pt will: perform bilateral rolling bed mobility w/ max Ax1 to decrease pt's burden of care and to assist in repositioning; perform supine<>sit mobility w/ max Ax1 to increase pt's independence w/ functional mobility; sit at EOB w/ min Ax1 for at least 15 minutes to increase pt's tolerance to an upright sitting position; increase balance ratings by at least 1 grade to decrease pt's risk of falls   PT Treatment Day 0   Plan   Treatment/Interventions LE strengthening/ROM; Therapeutic exercise;Cognitive reorientation;Patient/family training;Equipment eval/education; Bed mobility; Endurance training  (PT to see for OOB mobility if/when appropriate)   PT Frequency 1-2x/wk   Recommendation   PT Discharge Recommendation Return to facility with rehabilitation services  (vs return to facility w/o skilled PT services pending accurate PLOF prior to current admission)   Additional Comments DC rec: return to facility w/ skilled PT services vs no rehab needs pending determination of pt's baseline PLOF, CM to follow up   Additional Comments 2 Currently recommend dependent means/mechanical lift for OOB mobility   AM-PAC Basic Mobility Inpatient   Turning in Bed Without Bedrails 1   Lying on Back to Sitting on Edge of Flat Bed 1   Moving Bed to Chair 1   Standing Up From Chair 1   Walk in Room 1   Climb 3-5 Stairs 1   Basic Mobility Inpatient Raw Score 6   Highest Level Of Mobility   -HLM Goal 2: Bed activities/Dependent transfer   -HL Achieved 2: Bed activities/Dependent transfer   End of Consult   Patient Position at End of Consult Supine;Bed/Chair alarm activated; All needs within reach  (1/4 turn L w/ pillow placed under trunk and thigh to offload pt's sacrum)       The patient's AM-PAC Basic Mobility Inpatient Short Form Raw Score is 6  A Raw score of less than or equal to 16 suggests the patient may benefit from discharge to post-acute rehabilitation services  Please also refer to the recommendation of the Physical Therapist for safe discharge planning      Pt will benefit from skilled inpatient PT during this admission in order to facilitate progress towards goals and to maximize functional independence prior to [address] rec: return to facility w/ skilled PT services vs no rehab needs pending determination of pt's baseline BRAXTON BOWLING to follow up        Joana Arce PT, DPT  07/19/22

## 2022-07-19 NOTE — ASSESSMENT & PLAN NOTE
Patient is an 81year-old with history of atrial fibrillation on elliquis, dementia, has PEG tube in for severe calorie malnutrition, who was brought to the ED for dark brown emesis with some present in the PEG tube  Physical examination showed she was tachycardic, afebrile, with right upper tenderness  Blood work revealed leucocytosis with neutrophilic predominance, elevated liver enzymes  Ultrasound of the abdomen showed, gall stone with slurge, distended gall bladder with bladder wall thickening, sonographic sawant's sound which are alll consistent with acute cholecystitis    Plan  -Surgery consulted patient- advice conservative management with antibiotics, poor surgical candidate, if no improvement and clinical deterioration surgical team will contact IR for percutaneous cholecystectomy tube placement  -Antibiotics;  Ceftiaxone 1g, IV every 24 hours, Metronidazole 500 mg IV, every 8 hours  -Hydration, Ringer lactate 100cc/hour   -Geriatric pain management   Acetaminophen 975 mg, oral every 8 hours  Oxycodone 2 5 mg oral, every 4 hours for moderate pain  Oxycodone 5 mg oral, every 4 hours for severe pain  Hydromorphone 0 2mg IV every 4 hours for breakthrough pain  -NPO transitioned to clear liquid diet  -GI evaluated the patient and stated no indication for ERCP, may consider EGD if Hgb drops and patient has signs of active bleeding  -Had some episodes of elevated heart rates to the 120s, may be 2/2 to pain and setting of sepsis  -Continue metoprolol PRN q 6 hrs if HR > 574, hold if systolic BP < 90  -Monitor vital signs

## 2022-07-19 NOTE — ASSESSMENT & PLAN NOTE
Patient with Atrial  fibrillation on eliquis, no recent use of NSAID, present with dark brown emesis, part in the PEG tube  No documenation of dizziness or syncope  Patient is conscious although demented, no other visible sources of bleeding, rectal exam denied at ER   Hb was 11 2  rule out an upper GI bleed as complication of PEG tube  Differential; esophagitis or gastritis    Plan  Gastroenterology recommends holding off on EGD for now unless Hgb drops and signs of obvious bleeding present  NPO transitioned to clear liquid diet  Pantoprazole 40 mg iv drip  Ringers lactate 100cc/hour  Continue to monitor CBC  Continue to hold eliquis

## 2022-07-19 NOTE — H&P
[facility]  H&P- Roxy Keys 1935, 81 y o  female MRN: [id number]  Unit/Bed#: WILLIE Encounter: [id number]  Primary Care Provider: Eileen Block   Date and time admitted to hospital: 7/18/2022  8:04 PM    Acute cholecystitis due to biliary calculus  Assessment & Plan  Patient is an 81year-old with history of atrial fibrillation on elliquis, dementia, has PEG tube in for severe calorie malnutrition, who was brought to the ED for dark brown emesis with some present in the PEG tube  Physical examination showed she was tachycardic, afebrile, with right upper tenderness  Blood work revealed leucocytosis with neutrophilic predominance, elevated liver enzymes  Ultrasound of the abdomen showed, gall stone with slurge, distended gall bladder with bladder wall thickening, sonographic sawant's sound which are alll consistent with acute cholecystitis    Plan  -Surgery consulted patient- advice conservative management with antibiotics, if no response then laparoscopic cholecystectomy will be performed   -Antibiotics; Ceftiaxone 1g, IV every 24 hours, Metronidazole 500 mg IV, every 8 hours  -Hydration, Ringer lactate 100cc/hour   -Geriatric pain management   Acetaminophen 975 mg, oral every 8 hours  Oxycodone 2 5 mg oral, every 4 hours for moderate pain  Oxycodone 5 mg oral, every 4 hours for severe pain  Hydromorphone 0 2mg IV every 4 hours for breakthrough pain  -NPO  -Monitor vital signs    Coffee ground emesis  Assessment & Plan  Patient with Atrial  fibrillation on eliquis, no recent use of NSAID, present with dark brown emesis, part in the PEG tube  No documenation of dizziness or syncope  Patient is conscious although demented, no other visible sources of bleeding, rectal exam denied at ER   Hb was 11 2  rule out an upper GI bleed as complication of PEG tube  Differential; esophagitis or gastritis    Plan  Gastroenterology consult  NPO  Pantoprazole 40 mg iv drip  Ringers lactate 100cc/hour  Serial Hg and HCT checks every 8 hours    Stage IV pressure ulcer ([address] )  Assessment & Plan  Continue care plan    Skin care plans:  1-Hydraguard to bilateral buttock and heels BID and PRN  2-Elevate heels to offload pressure  3-Ehob cushion in chair when out of bed  4-Moisturize skin daily with skin nourishing cream   5-Turn/reposition q2h for pressure re-distribution on skin    6-Mid sacral wound- Cleanse wound with Dakin's solution  Then rinse with NSS, pat dry  Apply nickel thick layer of santyl to wound bed, then pack wound lightly with plain packing ribbon  Cover with 4x4, and then Allevyn foam dressing  Change daily and as needed for soilage/dislodgment  7-Left hip and right heel- Cleanse wounds with soap and water, pat dry  Apply Allevyn foam dressing over wounds  Edwin w/T and date  Change every 3 days and as needed  Peel back and check skin integrity every shift   8-P-500 low air loss mattress        Dementia without behavioral disturbance (HCC)  Assessment & Plan  No change from patients baseline  Plan  Fall precautions  Care as needed      VTE Pharmacologic Prophylaxis:   High Risk (Score >/= 5) - Pharmacological DVT Prophylaxis Contraindicated  Sequential Compression Devices Ordered  Code Status: Prior  None noted, information will be confirmed with family member  Discussion with family: no one available at the bed side, will update family next day with patiient plan and progress    Anticipated Length of Stay: Patient will be admitted on an inpatient basis with an anticipated length of stay of greater than 2 midnights secondary to gall blader infection  Chief Complaint: dark brown emesis for one day    History of Present Illness:  Harvinder Conner is a 81 y o  female with a PMH of Dementia, Atrial fibrillation on Eliquis, stage IV sacral ulcer  who presents with dark/coffee colored emesis and also seen in PEG tube   She has a PEG tube placed 7/1/2022 due to sever  Protein calorie malnutrition  Patient mostly demented, non cooperative, refused to answer most questions, no family member was present  Most of the information was obtained from ED sign out and chart review  Patient was brought to the ED by EMS from nursing facility  Admitted a about one week ago for SIRS and that time CT/US showed distended gall bladder, without thickening nor pericholecystic fluid, no sign of infection  She responded well iv zozyn  Abdominal Ultrasound done a the ED  yesterday showed large distended gall bladder, mild gall bladder wall thickening and sonographic sawant's sign positive  WBC [id number], with neutrophil predominance,  Hg 11 3, blood cultures pending  She was placed on ceftriaxone 1g and fluids    Review of systems:  Could not be obtained form patient    Past Medical and Surgical History:   Past Medical History:   Diagnosis Date    [facility] )     Dementia ([facility] )     DVT (deep venous thrombosis) ([facility] )     Hypertension     Pressure ulcer of sacral region        History reviewed  No pertinent surgical history  Meds/Allergies:  Prior to Admission medications    Medication Sig Start Date End Date Taking? Authorizing Provider   acetaminophen (TYLENOL) 325 mg tablet Take 650 mg by mouth every 6 (six) hours as needed    Historical Provider, MD   collagenase (SANTYL) ointment Apply 1 application topically daily    Historical Provider, MD   Eliquis 5 MG Hold medication today, 6/30/2022 and tomorrow, 7/1/2022 6/30/22   Sarah Armendariz PA-C   multivitamin [facility]) TABS Take 1 tablet by mouth daily    Historical Provider, MD   Skin Protectants, Misc  (PeriGuard) OINT Apply topically    Historical Provider, MD     I have been unable to obtain / verify an up to date medication list despite all reasonable attempts  Allergies: No Known Allergies    Social History:  Marital Status:     Occupation:   Patient Pre-hospital Living Situation: [facility]: n  Patient Pre-hospital Level of Mobility: non-ambulatory/bed bound  Patient Pre-hospital Diet Restrictions: peg tube feeding  Substance Use History:   Social History     Substance and Sexual Activity   Alcohol Use Not Currently     Social History     Tobacco Use   Smoking Status Never Smoker   Smokeless Tobacco Never Used     Social History     Substance and Sexual Activity   Drug Use Never       Family History:  History reviewed  No pertinent family history  Physical Exam:     Vitals:   Blood Pressure: 121/64 (07/19/22 0035)  Pulse: 79 (07/19/22 0035)  Temperature: 98 1 °F (36 7 °C) (07/18/22 2008)  Temp Source: Oral (07/18/22 2008)  Respirations: 20 (07/19/22 0035)  SpO2: 96 % (07/19/22 0035)    Physical Exam  Constitutional:       Appearance: Normal appearance  She is ill-appearing  HENT:      Head: Normocephalic and atraumatic  Mouth/Throat:      Mouth: Mucous membranes are moist       Pharynx: Oropharynx is clear  Cardiovascular:      Pulses: Normal pulses  Pulmonary:      Effort: Pulmonary effort is normal  No respiratory distress  Breath sounds: Normal breath sounds  Abdominal:      Palpations: Abdomen is soft  Tenderness: There is abdominal tenderness (right upper quadrant)  There is no right CVA tenderness or guarding  Musculoskeletal:      Cervical back: No rigidity  Skin:     General: Skin is warm  Comments: Presence of sacral ulcer dressing   Ulcer could not be appreciated as patient denied movement         Additional Data:     Lab Results:  Results from last 7 days   Lab Units 07/18/22  2110   WBC Thousand/uL 12 17*   HEMOGLOBIN g/dL 11 3*   HEMATOCRIT % 34 5*   PLATELETS Thousands/uL 527*   NEUTROS PCT % 79*   LYMPHS PCT % 12*   MONOS PCT % 6   EOS PCT % 0     Results from last 7 days   Lab Units 07/18/22  2110   SODIUM mmol/L 133*   POTASSIUM mmol/L 3 5   CHLORIDE mmol/L 98   CO2 mmol/L 29   BUN mg/dL 20   CREATININE mg/dL 0 31*   ANION GAP mmol/L 6   CALCIUM mg/dL 8 4   ALBUMIN g/dL 2 3*   TOTAL BILIRUBIN mg/dL 0 70   ALK PHOS U/L 288*   ALT U/L 238*   AST U/L 633*   GLUCOSE RANDOM mg/dL 116                 Results from last 7 days   Lab Units 07/19/22  0032   LACTIC ACID mmol/L 1 4       Imaging: Reviewed radiology reports from this admission including: ultrasound(s)  US right upper quadrant   Final Result by Jaycob Harmon DO (07/18 2235)      Again noted is a large gallstone and sludge within a distended gallbladder  There is mild gallbladder wall thickening  Sonographic Benítez sign reported as positive  Findings are suggestive of acute cholecystitis  Mild hepatomegaly  The study was marked in [facility] for immediate notification  Workstation performed: [id number]             EKG and Other Studies Reviewed on Admission:   · EKG: Sinus Tachycardia    ** Please Note: This note has been constructed using a voice recognition system   **

## 2022-07-19 NOTE — PLAN OF CARE
Problem: MOBILITY - ADULT  Goal: Maintain or return to baseline ADL function  Description: INTERVENTIONS:  -  Assess patient's ability to carry out ADLs; assess patient's baseline for ADL function and identify physical deficits which impact ability to perform ADLs (bathing, care of mouth/teeth, toileting, grooming, dressing, etc )  - Assess/evaluate cause of self-care deficits   - Assess range of motion  - Assess patient's mobility; develop plan if impaired  - Assess patient's need for assistive devices and provide as appropriate  - Encourage maximum independence but intervene and supervise when necessary  - Involve family in performance of ADLs  - Assess for home care needs following discharge   - Consider OT consult to assist with ADL evaluation and planning for discharge  - Provide patient education as appropriate  Outcome: Progressing  Goal: Maintains/Returns to pre admission functional level  Description: INTERVENTIONS:  - Perform BMAT or MOVE assessment daily    - Set and communicate daily mobility goal to care team and patient/family/caregiver  - Collaborate with rehabilitation services on mobility goals if consulted  - Perform Range of Motion 3 times a day  - Reposition patient every 3 hours    - Dangle patient 3 times a day  - Stand patient 3 times a day  - Ambulate patient 3 times a day  - Out of bed to chair 3 times a day   - Out of bed for meals 3 times a day  - Out of bed for toileting  - Record patient progress and toleration of activity level   Outcome: Progressing     Problem: Prexisting or High Potential for Compromised Skin Integrity  Goal: Skin integrity is maintained or improved  Description: INTERVENTIONS:  - Identify patients at risk for skin breakdown  - Assess and monitor skin integrity  - Assess and monitor nutrition and hydration status  - Monitor labs   - Assess for incontinence   - Turn and reposition patient  - Assist with mobility/ambulation  - Relieve pressure over bony prominences  - Avoid friction and shearing  - Provide appropriate hygiene as needed including keeping skin clean and dry  - Evaluate need for skin moisturizer/barrier cream  - Collaborate with interdisciplinary team   - Patient/family teaching  - Consider wound care consult   Outcome: Progressing

## 2022-07-19 NOTE — PROGRESS NOTES
[facility]  Progress Note - Laxmi Sick 1935, 81 y o  female MRN: [id number]  Unit/Bed#: W -01 Encounter: [id number]  Primary Care Provider: Jose Luis Pope   Date and time admitted to hospital: 7/18/2022  8:04 PM    * Acute cholecystitis due to biliary calculus  Assessment & Plan  Patient is an 81year-old with history of atrial fibrillation on elliquis, dementia, has PEG tube in for severe calorie malnutrition, who was brought to the ED for dark brown emesis with some present in the PEG tube  Physical examination showed she was tachycardic, afebrile, with right upper tenderness  Blood work revealed leucocytosis with neutrophilic predominance, elevated liver enzymes  Ultrasound of the abdomen showed, gall stone with slurge, distended gall bladder with bladder wall thickening, sonographic sawant's sound which are alll consistent with acute cholecystitis  Sepsis, POA (tachycardic at 1:11 a m , leukocytosis of 12 17, possible infectious etiology treated with IV ceftriaxone and Flagyl)    Plan  -Surgery consulted patient- advice conservative management with antibiotics, poor surgical candidate, if no improvement and clinical deterioration surgical team will contact IR for percutaneous cholecystectomy tube placement  -Antibiotics;  Ceftiaxone 1g, IV every 24 hours, Metronidazole 500 mg IV, every 8 hours  -Hydration, Ringer lactate 100cc/hour   -Geriatric pain management   Acetaminophen 975 mg, oral every 8 hours  Oxycodone 2 5 mg oral, every 4 hours for moderate pain  Oxycodone 5 mg oral, every 4 hours for severe pain  Hydromorphone 0 2mg IV every 4 hours for breakthrough pain  -NPO transitioned to clear liquid diet  -GI evaluated the patient and stated no indication for ERCP, may consider EGD if Hgb drops and patient has signs of active bleeding  -Had some episodes of elevated heart rates to the 120s, may be 2/2 to pain and setting of sepsis  -Continue metoprolol PRN q 6 hrs if HR > 084, hold if systolic BP < 90  -Monitor vital signs    Coffee ground emesis  Assessment & Plan  Patient with Atrial  fibrillation on eliquis, no recent use of NSAID, present with dark brown emesis, part in the PEG tube  No documenation of dizziness or syncope  Patient is conscious although demented, no other visible sources of bleeding, rectal exam denied at ER  Hb was 11 2  rule out an upper GI bleed as complication of PEG tube  Differential; esophagitis or gastritis    Plan  Gastroenterology recommends holding off on EGD for now unless Hgb drops and signs of obvious bleeding present  NPO transitioned to clear liquid diet  Pantoprazole 40 mg iv drip  Ringers lactate 100cc/hour  Continue to monitor CBC  Continue to hold eliquis    Stage IV pressure ulcer ([facility] [address] )  Assessment & Plan  Continue care plan    Skin care plans:  1-Hydraguard to bilateral buttock and heels BID and PRN  2-Elevate heels to offload pressure  3-Ehob cushion in chair when out of bed  4-Moisturize skin daily with skin nourishing cream   5-Turn/reposition q2h for pressure re-distribution on skin    6-Mid sacral wound- Cleanse wound with Dakin's solution  Then rinse with NSS, pat dry  Apply nickel thick layer of santyl to wound bed, then pack wound lightly with plain packing ribbon  Cover with 4x4, and then Allevyn foam dressing  Change daily and as needed for soilage/dislodgment  7-Left hip and right heel- Cleanse wounds with soap and water, pat dry  Apply Allevyn foam dressing over wounds  Edwin w/T and date  Change every 3 days and as needed  Peel back and check skin integrity every shift   8-P-500 low air loss mattress  Wound care will follow       Dementia without behavioral disturbance [facility])  Assessment & Plan  No change from patients baseline  Plan  Fall precautions  Care as needed      VTE Pharmacologic Prophylaxis:   High Risk (Score >/= 5) - Pharmacological DVT Prophylaxis Contraindicated   Sequential Compression Devices Ordered  Patient Centered Rounds: I performed bedside rounds with nursing staff today  Discussions with Specialists or Other Care Team Provider: Gastroenterology, General Surgery    Education and Discussions with Family / Patient: Updated  (son) via phone  Current Length of Stay: 0 day(s)  Current Patient Status: Inpatient   Discharge Plan: Anticipate discharge in 24-48 hrs to discharge location to be determined pending rehab evaluations  Code Status: Level 1 - Full Code    Subjective:   Patient seen and examined at the bedside this morning  No acute events overnight per patient and nurse  Patient states she has not had any further episodes of emesis since arrival to the hospital  States her abdominal pain is slightly better than the day prior  Denies nausea, diarrhea, constipation, as well as chest pain, palpitations, and shortness of breath  No fevers and chills currently  Objective:     Vitals:   Temp (24hrs), Av 2 °F (36 8 °C), Min:97 9 °F (36 6 °C), Max:98 6 °F (37 °C)    Temp:  [97 9 °F (36 6 °C)-98 6 °F (37 °C)] 97 9 °F (36 6 °C)  HR:  [] 104  Resp:  [18-20] 20  BP: ()/(61-76) 113/72  SpO2:  [95 %-99 %] 95 %  There is no height or weight on file to calculate BMI  Input and Output Summary (last 24 hours): Intake/Output Summary (Last 24 hours) at 2022 1624  Last data filed at 2022 1129  Gross per 24 hour   Intake 100 ml   Output --   Net 100 ml       Physical Exam:   Physical Exam  Constitutional:       General: She is not in acute distress  Appearance: She is not ill-appearing  HENT:      Head: Normocephalic and atraumatic  Cardiovascular:      Rate and Rhythm: Normal rate and regular rhythm  Pulses: Normal pulses  Heart sounds: Normal heart sounds  No murmur heard  No friction rub  No gallop  Pulmonary:      Effort: Pulmonary effort is normal  No respiratory distress  Breath sounds: Normal breath sounds   No wheezing, rhonchi or rales  Abdominal:      General: Abdomen is flat  Bowel sounds are normal  There is no distension  Palpations: Abdomen is soft  Tenderness: There is abdominal tenderness  Comments: Moderate tenderness in the RUQ   Skin:     General: Skin is warm and dry  Neurological:      General: No focal deficit present  Mental Status: Mental status is at baseline  Psychiatric:         Behavior: Behavior normal           Additional Data:     Labs:  Results from last 7 days   Lab Units 07/18/22  2110   WBC Thousand/uL 12 17*   HEMOGLOBIN g/dL 11 3*   HEMATOCRIT % 34 5*   PLATELETS Thousands/uL 527*   NEUTROS PCT % 79*   LYMPHS PCT % 12*   MONOS PCT % 6   EOS PCT % 0     Results from last 7 days   Lab Units 07/18/22  2110   SODIUM mmol/L 133*   POTASSIUM mmol/L 3 5   CHLORIDE mmol/L 98   CO2 mmol/L 29   BUN mg/dL 20   CREATININE mg/dL 0 31*   ANION GAP mmol/L 6   CALCIUM mg/dL 8 4   ALBUMIN g/dL 2 3*   TOTAL BILIRUBIN mg/dL 0 70   ALK PHOS U/L 288*   ALT U/L 238*   AST U/L 633*   GLUCOSE RANDOM mg/dL 116                 Results from last 7 days   Lab Units 07/19/22  0032   LACTIC ACID mmol/L 1 4       Lines/Drains:  Invasive Devices  Report    Peripheral Intravenous Line  Duration           Peripheral IV 07/18/22 Left Antecubital <1 day          Drain  Duration           Gastrostomy/Enterostomy Percutaneous Endoscopic Gastrostomy (PEG) 20 Fr  LUQ 17 days                      Imaging: Reviewed radiology reports from this admission including: ultrasound(s)    Recent Cultures (last 7 days):   Results from last 7 days   Lab Units 07/19/22  0032   BLOOD CULTURE  Received in Microbiology Lab  Culture in Progress  Received in Microbiology Lab  Culture in Progress         Last 24 Hours Medication List:   Current Facility-Administered Medications   Medication Dose Route Frequency Provider Last Rate    acetaminophen  650 mg Oral Q6H PRN Paulina Muñoz MD      acetaminophen  975 mg Oral Q8H Ham Parks MD      cefTRIAXone  1,000 mg Intravenous Q24H Paulina Aceves MD      collagenase  1 application Topical Daily Paulina Aceves MD      HYDROmorphone  0 2 mg Intravenous Q4H PRN Paulina Aceves MD      lactated ringers  75 mL/hr Intravenous Continuous Glenys Fox DO 75 mL/hr (07/19/22 1059)    metoprolol  2 5 mg Intravenous Q6H PRN Michelle Cardoza MD      metroNIDAZOLE  500 mg Intravenous Q8H Francisca Bowden MD Stopped (07/19/22 1129)    multivitamin stress formula  1 tablet Oral Daily Francisca Bowden MD      oxyCODONE  2 5 mg Oral Q4H PRN Paulina Aceves MD      oxyCODONE  5 mg Oral Q4H PRN Francisca Bowden MD      pantoprozole (PROTONIX) infusion (Continuous)  8 mg/hr Intravenous Continuous Francisca Bowden MD 8 mg/hr (07/19/22 1349)        Today, Patient Was Seen By: Severo Harvest, DO

## 2022-07-19 NOTE — ASSESSMENT & PLAN NOTE
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

## 2022-07-20 LAB
ALBUMIN SERPL BCP-MCNC: 1.9 G/DL (ref 3.5–5)
ALP SERPL-CCNC: 151 U/L (ref 34–104)
ALT SERPL W P-5'-P-CCNC: 96 U/L (ref 7–52)
ANION GAP SERPL CALCULATED.3IONS-SCNC: 5 MMOL/L (ref 4–13)
AST SERPL W P-5'-P-CCNC: 150 U/L (ref 13–39)
ATRIAL RATE: 117 BPM
BASOPHILS # BLD AUTO: 0.04 THOUSANDS/ΜL (ref 0–0.1)
BASOPHILS NFR BLD AUTO: 1 % (ref 0–1)
BILIRUB SERPL-MCNC: 0.75 MG/DL (ref 0.2–1)
BUN SERPL-MCNC: 14 MG/DL (ref 5–25)
CALCIUM ALBUM COR SERPL-MCNC: 9.7 MG/DL (ref 8.3–10.1)
CALCIUM SERPL-MCNC: 8 MG/DL (ref 8.4–10.2)
CHLORIDE SERPL-SCNC: 102 MMOL/L (ref 96–108)
CO2 SERPL-SCNC: 28 MMOL/L (ref 21–32)
CREAT SERPL-MCNC: 0.32 MG/DL (ref 0.6–1.3)
EOSINOPHIL # BLD AUTO: 0.17 THOUSAND/ΜL (ref 0–0.61)
EOSINOPHIL NFR BLD AUTO: 2 % (ref 0–6)
ERYTHROCYTE [DISTWIDTH] IN BLOOD BY AUTOMATED COUNT: 18.7 % (ref 11.6–15.1)
GFR SERPL CREATININE-BSD FRML MDRD: 102 ML/MIN/1.73SQ M
GLUCOSE SERPL-MCNC: 78 MG/DL (ref 65–140)
HCT VFR BLD AUTO: 26 % (ref 34.8–46.1)
HEMOCCULT STL QL: ABNORMAL
HEMOCCULT STL QL: ABNORMAL
HEMOCCULT STL QL: POSITIVE
HGB BLD-MCNC: 8.8 G/DL (ref 11.5–15.4)
IMM GRANULOCYTES # BLD AUTO: 0.1 THOUSAND/UL (ref 0–0.2)
IMM GRANULOCYTES NFR BLD AUTO: 1 % (ref 0–2)
LYMPHOCYTES # BLD AUTO: 1.36 THOUSANDS/ΜL (ref 0.6–4.47)
LYMPHOCYTES NFR BLD AUTO: 19 % (ref 14–44)
MCH RBC QN AUTO: 33.6 PG (ref 26.8–34.3)
MCHC RBC AUTO-ENTMCNC: 33.8 G/DL (ref 31.4–37.4)
MCV RBC AUTO: 99 FL (ref 82–98)
MONOCYTES # BLD AUTO: 0.61 THOUSAND/ΜL (ref 0.17–1.22)
MONOCYTES NFR BLD AUTO: 9 % (ref 4–12)
NEUTROPHILS # BLD AUTO: 4.86 THOUSANDS/ΜL (ref 1.85–7.62)
NEUTS SEG NFR BLD AUTO: 68 % (ref 43–75)
NRBC BLD AUTO-RTO: 0 /100 WBCS
P AXIS: 96 DEGREES
PLATELET # BLD AUTO: 394 THOUSANDS/UL (ref 149–390)
PMV BLD AUTO: 8.9 FL (ref 8.9–12.7)
POTASSIUM SERPL-SCNC: 3.3 MMOL/L (ref 3.5–5.3)
PR INTERVAL: 132 MS
PROT SERPL-MCNC: 4.5 G/DL (ref 6.4–8.4)
QRS AXIS: 23 DEGREES
QRSD INTERVAL: 60 MS
QT INTERVAL: 316 MS
QTC INTERVAL: 434 MS
RBC # BLD AUTO: 2.62 MILLION/UL (ref 3.81–5.12)
SODIUM SERPL-SCNC: 135 MMOL/L (ref 135–147)
T WAVE AXIS: 77 DEGREES
VENTRICULAR RATE: 113 BPM
WBC # BLD AUTO: 7.14 THOUSAND/UL (ref 4.31–10.16)

## 2022-07-20 PROCEDURE — 82272 OCCULT BLD FECES 1-3 TESTS: CPT

## 2022-07-20 PROCEDURE — 93010 ELECTROCARDIOGRAM REPORT: CPT | Performed by: INTERNAL MEDICINE

## 2022-07-20 PROCEDURE — 99232 SBSQ HOSP IP/OBS MODERATE 35: CPT | Performed by: INTERNAL MEDICINE

## 2022-07-20 PROCEDURE — 80053 COMPREHEN METABOLIC PANEL: CPT

## 2022-07-20 PROCEDURE — 99232 SBSQ HOSP IP/OBS MODERATE 35: CPT | Performed by: SURGERY

## 2022-07-20 PROCEDURE — C9113 INJ PANTOPRAZOLE SODIUM, VIA: HCPCS | Performed by: INTERNAL MEDICINE

## 2022-07-20 PROCEDURE — 85025 COMPLETE CBC W/AUTO DIFF WBC: CPT

## 2022-07-20 RX ORDER — SODIUM CHLORIDE, SODIUM LACTATE, POTASSIUM CHLORIDE, CALCIUM CHLORIDE 600; 310; 30; 20 MG/100ML; MG/100ML; MG/100ML; MG/100ML
50 INJECTION, SOLUTION INTRAVENOUS CONTINUOUS
Status: DISCONTINUED | OUTPATIENT
Start: 2022-07-20 | End: 2022-07-20

## 2022-07-20 RX ORDER — POTASSIUM CHLORIDE 20 MEQ/1
20 TABLET, EXTENDED RELEASE ORAL ONCE
Status: DISCONTINUED | OUTPATIENT
Start: 2022-07-20 | End: 2022-07-20

## 2022-07-20 RX ORDER — PANTOPRAZOLE SODIUM 40 MG/10ML
40 INJECTION, POWDER, LYOPHILIZED, FOR SOLUTION INTRAVENOUS EVERY 12 HOURS
Status: DISCONTINUED | OUTPATIENT
Start: 2022-07-20 | End: 2022-07-22 | Stop reason: HOSPADM

## 2022-07-20 RX ORDER — POTASSIUM CHLORIDE 14.9 MG/ML
20 INJECTION INTRAVENOUS
Status: COMPLETED | OUTPATIENT
Start: 2022-07-20 | End: 2022-07-20

## 2022-07-20 RX ORDER — MICONAZOLE NITRATE 20 MG/G
CREAM TOPICAL 2 TIMES DAILY
Status: DISCONTINUED | OUTPATIENT
Start: 2022-07-20 | End: 2022-07-22 | Stop reason: HOSPADM

## 2022-07-20 RX ADMIN — ACETAMINOPHEN 975 MG: 325 TABLET, FILM COATED ORAL at 22:25

## 2022-07-20 RX ADMIN — COLLAGENASE SANTYL 1 APPLICATION: 250 OINTMENT TOPICAL at 09:45

## 2022-07-20 RX ADMIN — METRONIDAZOLE 500 MG: 500 INJECTION, SOLUTION INTRAVENOUS at 09:15

## 2022-07-20 RX ADMIN — POTASSIUM CHLORIDE 20 MEQ: 14.9 INJECTION, SOLUTION INTRAVENOUS at 09:40

## 2022-07-20 RX ADMIN — METRONIDAZOLE 500 MG: 500 INJECTION, SOLUTION INTRAVENOUS at 18:39

## 2022-07-20 RX ADMIN — POTASSIUM CHLORIDE 20 MEQ: 14.9 INJECTION, SOLUTION INTRAVENOUS at 12:33

## 2022-07-20 RX ADMIN — ACETAMINOPHEN 975 MG: 325 TABLET, FILM COATED ORAL at 15:15

## 2022-07-20 RX ADMIN — OXYCODONE HYDROCHLORIDE 5 MG: 5 TABLET ORAL at 15:15

## 2022-07-20 RX ADMIN — PANTOPRAZOLE SODIUM 40 MG: 40 INJECTION, POWDER, FOR SOLUTION INTRAVENOUS at 22:00

## 2022-07-20 RX ADMIN — METRONIDAZOLE 500 MG: 500 INJECTION, SOLUTION INTRAVENOUS at 02:16

## 2022-07-20 RX ADMIN — MICONAZOLE NITRATE: 20 CREAM TOPICAL at 18:36

## 2022-07-20 NOTE — MALNUTRITION/BMI
This medical record reflects one or more clinical indicators suggestive of malnutrition and/or morbid obesity  Malnutrition Findings:   Adult Malnutrition type: Chronic illness  Adult Degree of Malnutrition: Other severe protein calorie malnutrition  Malnutrition Characteristics: Muscle loss, Weight loss, Fat loss                360 Statement: Severe protein calorie malnutrition r/t inadequate intake as evidenced by a 9% weight loss over 2 months, muscle wasting present to temples, and sunken orbitals; currently being treated with PEG tube feedings  BMI Findings: There is no height or weight on file to calculate BMI  See Nutrition note dated 7/20/2022 for additional details  Completed nutrition assessment is viewable in the nutrition documentation

## 2022-07-20 NOTE — NUTRITION
07/20/22 1536   Biochemical Data,Medical Tests, and Procedures   Biochemical Data/Medical Tests/Procedures Lab values reviewed; Meds reviewed   Labs (Comment) 7/20: K 3 3, Creat 0 32   Meds (Comment) MVI, Protonix, KCl   Nutrition-Focused Physical Exam   Nutrition-Focused Physical Exam Findings RN skin assessment reviewed; No edema documented   Adequacy of Intake   Nutrition Modality EN   Feeding Route   PO NPO   Tube Feeding PEG tube   Formula rate 60 ml/hr  (ordered not yet running)   Continuous Continuous via Pump   Pump Type Other (Comment)  (Pt admitted from rehab facility)   Free Water From EN 1162 mL   Water Flushes Yes  (100 ml q 4 hrs)   Total Free Water  1766 mL   Total EN Volume 1440 mL   Total Kcal intake 1728   Protein Intake (g) 79 g   Current PO Intake   Current Diet Order Ice chips   Estimated calorie intake compared to estimated need Not yet meeting estimated nutritional needs  Plan to resume tube feeds today  PES Statement   Problem Continue previous diagnosis   PES Statement 2   Problem Continue previous diagnosis   Recommendations/Interventions   Malnutrition/BMI Present Yes   Adult Malnutrition type Chronic illness   Adult Degree of Malnutrition Other severe protein calorie malnutrition   Malnutrition Characteristics Muscle loss;Weight loss; Fat loss   360 Statement Severe protein calorie malnutrition r/t inadequate intake as evidenced by a 9% weight loss over 2 months, muscle wasting present to temples, and sunken orbitals; currently being treated with PEG tube feeds  Summary Plan to resume tube feeds today  Tube feeds ordered per previous regimen  Recommend slow advancement to goal rate  Interventions/Recommendations Initiate EN;Obtain current weight   Recommendations to Provider Slow advancement of tube feeds to goal rate  Recommend Jevity 1 2 @ 10 ml/hr  Advance by 10 ml q 4 hrs to eventual goal rate of 60 ml/hr  Water flushes of 100 ml q 4 hrs   Once goal rate is reached, recommend addition of Prosource for TF once per day to provide additional 11g protein for wounds     Education Assessment   Education Patient/caregiver not appropriate for education at this time   Patient Nutrition Goals   Goal Meet EN/PN needs   Goal Status Met;New goal made   Timeframe to complete goal by next f/u   Nutrition Complexity Risk   Nutrition complexity level Moderate risk   Nutrition review: 07/22/22  (Check TF tolerance)   Follow up date 07/25/22  (5 day)

## 2022-07-20 NOTE — PROGRESS NOTES
[facility]  Progress Note - Araceli Yan 1935, 81 y o  female MRN: [id number]  Unit/Bed#: W -01 Encounter: [id number]  Primary Care Provider: Isis Harley   Date and time admitted to hospital: 7/18/2022  8:04 PM    * Acute cholecystitis due to biliary calculus  Assessment & Plan  Patient is an 81year-old with history of atrial fibrillation on elliquis, dementia, has PEG tube in for severe calorie malnutrition, who was brought to the ED for dark brown emesis with some present in the PEG tube  Physical examination showed she was tachycardic, afebrile, with right upper tenderness  Blood work revealed leucocytosis with neutrophilic predominance, elevated liver enzymes  Ultrasound of the abdomen showed, gall stone with slurge, distended gall bladder with bladder wall thickening, sonographic sawant's sound which are alll consistent with acute cholecystitis  Sepsis, POA (tachycardic at 1:11 a m , leukocytosis of 12 17, possible infectious etiology treated with IV ceftriaxone and Flagyl)    Plan  -Surgery consulted patient- advice conservative management with antibiotics, poor surgical candidate, if no improvement and clinical deterioration surgical team will contact IR for percutaneous cholecystectomy tube placement  -Antibiotics;  Ceftiaxone 1g, IV every 24 hours, Metronidazole 500 mg IV, every 8 hours  -Hydration, Ringer lactate 100cc/hour   -Geriatric pain management   Acetaminophen 975 mg, oral every 8 hours  Oxycodone 2 5 mg oral, every 4 hours for moderate pain  Oxycodone 5 mg oral, every 4 hours for severe pain  Hydromorphone 0 2mg IV every 4 hours for breakthrough pain  -NPO transitioned to clear liquid diet  -GI evaluated the patient and stated no indication for ERCP, may consider EGD if Hgb drops and patient has signs of active bleeding  -Had some episodes of elevated heart rates to the 120s, may be 2/2 to pain and setting of sepsis  -Continue metoprolol PRN q 6 hrs if HR > 241, hold if systolic BP < 90  -Monitor vital signs    Coffee ground emesis  Assessment & Plan  Patient with Atrial  fibrillation on eliquis, no recent use of NSAID, present with dark brown emesis, part in the PEG tube  No documenation of dizziness or syncope  Patient is conscious although demented, no other visible sources of bleeding, rectal exam denied at ER  Hb was 11 2  rule out an upper GI bleed as complication of PEG tube  Differential; esophagitis or gastritis    Plan  Gastroenterology recommends holding off on EGD for now unless Hgb drops and signs of obvious bleeding present  Diet: Tube feeds with no orals  Continue pantoprazole 40 mg iv drip  Ringers lactate 100cc/hour  Continue to monitor CBC  Continue to hold eliquis    Stage IV pressure ulcer ([facility] [address] )  Assessment & Plan  Continue care plan    Skin care plans:  1-Hydraguard to bilateral buttock and heels BID and PRN  2-Elevate heels to offload pressure  3-Ehob cushion in chair when out of bed  4-Moisturize skin daily with skin nourishing cream   5-Turn/reposition q2h for pressure re-distribution on skin    6-Mid sacral wound- Cleanse wound with Dakin's solution  Then rinse with NSS, pat dry  Apply nickel thick layer of santyl to wound bed, then pack wound lightly with plain packing ribbon  Cover with 4x4, and then Allevyn foam dressing  Change daily and as needed for soilage/dislodgment  7-Left hip and right heel- Cleanse wounds with soap and water, pat dry  Apply Allevyn foam dressing over wounds  Edwin w/T and date  Change every 3 days and as needed  Peel back and check skin integrity every shift   8-P-500 low air loss mattress    Wound care will follow       Dementia without behavioral disturbance (HCC)  Assessment & Plan  No change from patients baseline  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, bedbound status, and resistance to mobility    Plan  Fall precautions  Care as needed      VTE Pharmacologic Prophylaxis: VTE Score: 7 High Risk (Score >/= 5) - Pharmacological DVT Prophylaxis Contraindicated  Sequential Compression Devices Ordered  Patient Centered Rounds: Table rounds performed with attending  Discussions with Specialists or Other Care Team Provider: Gastroenterology, General Surgery    Education and Discussions with Family / Patient: Updated  (son) via phone  Current Length of Stay: 1 day(s)  Current Patient Status: Inpatient   Discharge Plan: Anticipate discharge in 24-48 hrs to rehab facility  Code Status: Level 1 - Full Code    Subjective:   Patient seen and examined at the bedside this morning  No acute events overnight per nurse  Reports some mild to moderate abdominal tenderness that she described as a 4/10  PEG tube remains in place with full tube feeds  Denies chest pain, SOB, nausea, vomiting, diarrhea, and constipation  Objective:     Vitals:   Temp (24hrs), Av 1 °F (36 7 °C), Min:97 8 °F (36 6 °C), Max:98 7 °F (37 1 °C)    Temp:  [97 8 °F (36 6 °C)-98 7 °F (37 1 °C)] 97 8 °F (36 6 °C)  HR:  [100-105] 100  Resp:  [16-20] 16  BP: (100-113)/(51-72) 100/51  SpO2:  [94 %-95 %] 94 %  There is no height or weight on file to calculate BMI  Input and Output Summary (last 24 hours): Intake/Output Summary (Last 24 hours) at 2022 1354  Last data filed at 2022 1138  Gross per 24 hour   Intake 2733 08 ml   Output 200 ml   Net 2533 08 ml       Physical Exam:   Physical Exam  Vitals and nursing note reviewed  Constitutional:       General: She is not in acute distress  Appearance: Normal appearance  She is well-developed  She is not ill-appearing  HENT:      Head: Normocephalic and atraumatic  Eyes:      Conjunctiva/sclera: Conjunctivae normal    Cardiovascular:      Rate and Rhythm: Normal rate and regular rhythm  Pulses: Normal pulses  Heart sounds: Normal heart sounds  No murmur heard  Pulmonary:      Effort: Pulmonary effort is normal  No respiratory distress  Breath sounds: Normal breath sounds  Abdominal:      General: Abdomen is flat  Bowel sounds are normal  There is no distension  Palpations: Abdomen is soft  Tenderness: There is abdominal tenderness in the right lower quadrant  Musculoskeletal:      Cervical back: Neck supple  Skin:     General: Skin is warm and dry  Neurological:      General: No focal deficit present  Mental Status: She is alert  Mental status is at baseline  Psychiatric:         Behavior: Behavior normal           Additional Data:     Labs:  Results from last 7 days   Lab Units 07/20/22  0627   WBC Thousand/uL 7 14   HEMOGLOBIN g/dL 8 8*   HEMATOCRIT % 26 0*   PLATELETS Thousands/uL 394*   NEUTROS PCT % 68   LYMPHS PCT % 19   MONOS PCT % 9   EOS PCT % 2     Results from last 7 days   Lab Units 07/20/22  0627   SODIUM mmol/L 135   POTASSIUM mmol/L 3 3*   CHLORIDE mmol/L 102   CO2 mmol/L 28   BUN mg/dL 14   CREATININE mg/dL 0 32*   ANION GAP mmol/L 5   CALCIUM mg/dL 8 0*   ALBUMIN g/dL 1 9*   TOTAL BILIRUBIN mg/dL 0 75   ALK PHOS U/L 151*   ALT U/L 96*   AST U/L 150*   GLUCOSE RANDOM mg/dL 78                 Results from last 7 days   Lab Units 07/19/22  0032   LACTIC ACID mmol/L 1 4       Lines/Drains:  Invasive Devices  Report    Peripheral Intravenous Line  Duration           Peripheral IV 07/20/22 Right Antecubital <1 day          Drain  Duration           Gastrostomy/Enterostomy Percutaneous Endoscopic Gastrostomy (PEG) 20 Fr  LUQ 17 days                      Imaging: Reviewed radiology reports from this admission including: ultrasound(s)    Recent Cultures (last 7 days):   Results from last 7 days   Lab Units 07/19/22  0032   BLOOD CULTURE  No Growth at 24 hrs  No Growth at 24 hrs         Last 24 Hours Medication List:   Current Facility-Administered Medications   Medication Dose Route Frequency Provider Last Rate    acetaminophen  650 mg Oral Q6H PRN Paulina Hodgson MD      acetaminophen  975 mg Oral Q8H Bernardo Lozano MD      cefTRIAXone  1,000 mg Intravenous Q24H Romy Barnard MD Stopped (07/19/22 2342)    collagenase  1 application Topical Daily Romy Barnard MD      HYDROmorphone  0 2 mg Intravenous Q4H PRN Romy Barnard MD      lactated ringers  50 mL/hr Intravenous Continuous Katheryn Arshad MD 50 mL/hr (07/20/22 1148)    metoprolol  2 5 mg Intravenous Q6H PRN Katheryn Arshad MD      metroNIDAZOLE  500 mg Intravenous Q8H Romy Barnard  mg (07/20/22 0915)    multivitamin stress formula  1 tablet Oral Daily Romy Barnard MD      oxyCODONE  2 5 mg Oral Q4H PRN Paulina Josue MD      oxyCODONE  5 mg Oral Q4H PRN Romy Barnard MD      pantoprozole (PROTONIX) infusion (Continuous)  8 mg/hr Intravenous Continuous Romy Barnard MD 8 mg/hr (07/19/22 1349)    potassium chloride  20 mEq Intravenous Q2H May Thu Alexa Dolan MD 20 mEq (07/20/22 1233)        Today, Patient Was Seen By: [employer], DO

## 2022-07-20 NOTE — PROGRESS NOTES
Progress Note - General Surgery  Ananda Port 81 y o  female MRN: [id number]  Unit/Bed#: W -01 Encounter: [id number]    Assessment:  81 y o  female w/ acute cholecystitis, failure to thrive, and caloric malnutrition with coffee-ground emesis prior to admission  Plan:  - Diet NPO  - Pain and Nausea control PRN  - Incentive spirometry  - OOB, ambulate  - follow-up labs  - if labs continue to trend down recommend resuming tube feeds  - continue antibiotics  - GI for EGD if any signs of rebleeding    Subjective/Objective     Subjective:  Patient states that she has some on all right upper quadrant pain this morning  He has had at least 1 bowel movement overnight      Objective:   Vitals: Blood pressure 100/52, pulse 105, temperature 98 7 °F (37 1 °C), temperature source Oral, resp  rate 16, SpO2 95 %  ,There is no height or weight on file to calculate BMI  I/O       07/18 0701 07/19 0700 07/19 0701 07/20 0700    P  O   120    I  V   908 1    IV Piggyback  250    Total Intake  1278 1    Urine  200    Stool  0    Total Output  200    Net  +1078 1          Unmeasured Stool Occurrence  1 x          Physical Exam:  Gen: NAD, Aox3, Comfortable in bed  Chest: Normal work of breathing, no respiratory distress  Abd: Soft, ND, minimal right upper quadrant tenderness  Peg tube in place and is capped    Ext: No Edema  Skin: Warm, Dry, Intact      Lab, Imaging and other studies:  Pending  VTE Pharmacologic Prophylaxis: Sequential compression device (Venodyne)   VTE Mechanical Prophylaxis: sequential compression device        Mara Jameson MD  7/20/2022  6:57 AM

## 2022-07-20 NOTE — PROGRESS NOTES
Progress Note - Helene Birmingham 81 y o  female MRN: [id number]    Unit/Bed#: W -01 Encounter: [id number]    Assessment and Plan:   Principal Problem:    Acute cholecystitis due to biliary calculus  Active Problems:    Dementia without behavioral disturbance (HCC)    Stage IV pressure ulcer (HCC)    Coffee ground emesis      1  Acute cholecystitis due to biliary calculus  - Pt presented with dark brown emesis at nursing facility   - RUQ USG showed a large gallstone and sludge within a distended gallbladder   - General surgery consulted and recommendations appreciated  - Right upper tenderness + on physical exam on admission   - Patient has a high risks of surgery, does will continue antibiotic at this time  - Monitor symptoms   - Pt is stable on 07/20/22  - No plan for ERCP from GI team at present         2  Coffee-ground emesis  - patient presented with coffee-ground emesis  - no overnight event and coffee-ground emesis  - hemoglobin stable at 11 3 this morning   - Monitor symptoms and will consider EGD if Hb drops or has signs of overt bleeding      3  Tansaminitis      Lab Results   Component Value Date     (H) 07/20/2022     (H) 07/18/2022    ALT 96 (H) 07/20/2022     (H) 07/18/2022    ALKPHOS 151 (H) 07/20/2022    ALKPHOS 288 (H) 07/18/2022    TBILI 0 75 07/20/2022    TBILI 0 70 07/18/2022     - Pt was kept NPO last night  - Trending down liver enzymes  - Monitor symptoms and daily labs  Summary of recommendation  - agree to resume tube feeding tube  - continue antibiotics Rocephin and Flagyl  - monitor hemoglobin and symptoms   - Patient has a high risks of surgery, thus will continue conservative management at this time    - No plan for ERCP at present    - Monitor symptoms and will consider EGD if Hb drops or has signs of overt bleeding      ---------------------------------------------------------------------------------------------------------    Subjective:     No significant overnight event  Pt is demented  No new complaint this morning  Objective:     Vitals: Blood pressure 100/51, pulse 100, temperature 97 8 °F (36 6 °C), temperature source Oral, resp  rate 16, SpO2 94 %  ,There is no height or weight on file to calculate BMI  Intake/Output Summary (Last 24 hours) at 7/20/2022 1119  Last data filed at 7/20/2022 0246  Gross per 24 hour   Intake 1278 08 ml   Output 200 ml   Net 1078 08 ml       Physical Exam:     General Appearance: Alert, appears stated age and cooperative  Lungs: Clear to auscultation bilaterally, no rales or rhonchi, no labored breathing/accessory muscle use  Heart: Regular rate and rhythm, S1, S2 normal, no murmur, click, rub or gallop  Abdomen: Soft, non-tender, non-distended; bowel sounds normal; no masses or no organomegaly  PEG tube intact  Extremities: No cyanosis, clubbing, or edema    Invasive Devices  Report    Peripheral Intravenous Line  Duration           Peripheral IV 07/20/22 Right Antecubital <1 day          Drain  Duration           Gastrostomy/Enterostomy Percutaneous Endoscopic Gastrostomy (PEG) 20 Fr  LUQ 17 days                Lab Results:  Results from last 7 days   Lab Units 07/20/22  0627   WBC Thousand/uL 7 14   HEMOGLOBIN g/dL 8 8*   HEMATOCRIT % 26 0*   PLATELETS Thousands/uL 394*   NEUTROS PCT % 68   LYMPHS PCT % 19   MONOS PCT % 9   EOS PCT % 2     Results from last 7 days   Lab Units 07/20/22  0627   POTASSIUM mmol/L 3 3*   CHLORIDE mmol/L 102   CO2 mmol/L 28   BUN mg/dL 14   CREATININE mg/dL 0 32*   CALCIUM mg/dL 8 0*   ALK PHOS U/L 151*   ALT U/L 96*   AST U/L 150*     Invalid input(s): BILI            Imaging Studies: I have personally reviewed pertinent imaging studies  US right upper quadrant    Result Date: 7/18/2022  Impression: Again noted is a large gallstone and sludge within a distended gallbladder  There is mild gallbladder wall thickening  Sonographic Benítez sign reported as positive    Findings are suggestive of acute cholecystitis  Mild hepatomegaly  The study was marked in [facility] for immediate notification   Workstation performed: [id number]

## 2022-07-21 LAB
ALBUMIN SERPL BCP-MCNC: 2 G/DL (ref 3.5–5)
ALP SERPL-CCNC: 126 U/L (ref 34–104)
ALT SERPL W P-5'-P-CCNC: 69 U/L (ref 7–52)
ANION GAP SERPL CALCULATED.3IONS-SCNC: 5 MMOL/L (ref 4–13)
AST SERPL W P-5'-P-CCNC: 84 U/L (ref 13–39)
BILIRUB SERPL-MCNC: 0.42 MG/DL (ref 0.2–1)
BUN SERPL-MCNC: 15 MG/DL (ref 5–25)
CALCIUM ALBUM COR SERPL-MCNC: 9.7 MG/DL (ref 8.3–10.1)
CALCIUM SERPL-MCNC: 8.1 MG/DL (ref 8.4–10.2)
CHLORIDE SERPL-SCNC: 104 MMOL/L (ref 96–108)
CO2 SERPL-SCNC: 25 MMOL/L (ref 21–32)
CREAT SERPL-MCNC: 0.39 MG/DL (ref 0.6–1.3)
ERYTHROCYTE [DISTWIDTH] IN BLOOD BY AUTOMATED COUNT: 18.9 % (ref 11.6–15.1)
GFR SERPL CREATININE-BSD FRML MDRD: 96 ML/MIN/1.73SQ M
GLUCOSE SERPL-MCNC: 127 MG/DL (ref 65–140)
HCT VFR BLD AUTO: 27 % (ref 34.8–46.1)
HGB BLD-MCNC: 8.6 G/DL (ref 11.5–15.4)
MCH RBC QN AUTO: 33.1 PG (ref 26.8–34.3)
MCHC RBC AUTO-ENTMCNC: 31.9 G/DL (ref 31.4–37.4)
MCV RBC AUTO: 104 FL (ref 82–98)
PLATELET # BLD AUTO: 315 THOUSANDS/UL (ref 149–390)
PMV BLD AUTO: 9.1 FL (ref 8.9–12.7)
POTASSIUM SERPL-SCNC: 3.8 MMOL/L (ref 3.5–5.3)
PROT SERPL-MCNC: 4.4 G/DL (ref 6.4–8.4)
RBC # BLD AUTO: 2.6 MILLION/UL (ref 3.81–5.12)
SODIUM SERPL-SCNC: 134 MMOL/L (ref 135–147)
WBC # BLD AUTO: 5.07 THOUSAND/UL (ref 4.31–10.16)

## 2022-07-21 PROCEDURE — 80053 COMPREHEN METABOLIC PANEL: CPT

## 2022-07-21 PROCEDURE — 99232 SBSQ HOSP IP/OBS MODERATE 35: CPT | Performed by: SURGERY

## 2022-07-21 PROCEDURE — 99232 SBSQ HOSP IP/OBS MODERATE 35: CPT | Performed by: INTERNAL MEDICINE

## 2022-07-21 PROCEDURE — C9113 INJ PANTOPRAZOLE SODIUM, VIA: HCPCS | Performed by: INTERNAL MEDICINE

## 2022-07-21 PROCEDURE — 85027 COMPLETE CBC AUTOMATED: CPT

## 2022-07-21 RX ADMIN — METRONIDAZOLE 500 MG: 500 INJECTION, SOLUTION INTRAVENOUS at 19:29

## 2022-07-21 RX ADMIN — OXYCODONE HYDROCHLORIDE 5 MG: 5 TABLET ORAL at 01:13

## 2022-07-21 RX ADMIN — OXYCODONE HYDROCHLORIDE 5 MG: 5 TABLET ORAL at 08:33

## 2022-07-21 RX ADMIN — METRONIDAZOLE 500 MG: 500 INJECTION, SOLUTION INTRAVENOUS at 01:57

## 2022-07-21 RX ADMIN — CEFTRIAXONE 1000 MG: 1 INJECTION, SOLUTION INTRAVENOUS at 00:16

## 2022-07-21 RX ADMIN — METRONIDAZOLE 500 MG: 500 INJECTION, SOLUTION INTRAVENOUS at 12:34

## 2022-07-21 RX ADMIN — MICONAZOLE NITRATE: 20 CREAM TOPICAL at 08:34

## 2022-07-21 RX ADMIN — PANTOPRAZOLE SODIUM 40 MG: 40 INJECTION, POWDER, FOR SOLUTION INTRAVENOUS at 08:33

## 2022-07-21 RX ADMIN — MICONAZOLE NITRATE: 20 CREAM TOPICAL at 17:39

## 2022-07-21 RX ADMIN — ACETAMINOPHEN 975 MG: 325 TABLET, FILM COATED ORAL at 05:34

## 2022-07-21 RX ADMIN — ACETAMINOPHEN 975 MG: 325 TABLET, FILM COATED ORAL at 22:12

## 2022-07-21 RX ADMIN — PANTOPRAZOLE SODIUM 40 MG: 40 INJECTION, POWDER, FOR SOLUTION INTRAVENOUS at 22:12

## 2022-07-21 RX ADMIN — B-COMPLEX W/ C & FOLIC ACID TAB 1 TABLET: TAB at 08:33

## 2022-07-21 NOTE — PROGRESS NOTES
[facility]  Progress Note - Samuel Sabillon 1935, 81 y o  female MRN: [id number]  Unit/Bed#: W -01 Encounter: [id number]  Primary Care Provider: Tab Randolph   Date and time admitted to hospital: 7/18/2022  8:04 PM    * Acute cholecystitis due to biliary calculus  Assessment & Plan  Patient is an 81year-old with history of atrial fibrillation on elliquis, dementia, has PEG tube in for severe calorie malnutrition, who was brought to the ED for dark brown emesis with some present in the PEG tube  Physical examination showed she was tachycardic, afebrile, with right upper tenderness  Blood work revealed leucocytosis with neutrophilic predominance, elevated liver enzymes  Ultrasound of the abdomen showed, gall stone with slurge, distended gall bladder with bladder wall thickening, sonographic sawant's sound which are alll consistent with acute cholecystitis  Sepsis, POA (tachycardic at 1:11 a m , leukocytosis of 12 17, possible infectious etiology treated with IV ceftriaxone and Flagyl)    Plan  -Surgery consulted patient- advice conservative management with antibiotics, poor surgical candidate, if no improvement and clinical deterioration surgical team will contact IR for percutaneous cholecystectomy tube placement  -Antibiotics;  Ceftiaxone 1g, IV every 24 hours, Metronidazole 500 mg IV, every 8 hours  -Hydration, Ringer lactate 100cc/hour   -Geriatric pain management   Acetaminophen 975 mg, oral every 8 hours  Oxycodone 2 5 mg oral, every 4 hours for moderate pain  Oxycodone 5 mg oral, every 4 hours for severe pain  Hydromorphone 0 2mg IV every 4 hours for breakthrough pain  -NPO with tube feeds  -GI evaluated the patient and stated no indication for ERCP, may consider EGD if Hgb drops and patient has signs of active bleeding  -Had some episodes of elevated heart rates to the 120s, may be 2/2 to pain and setting of sepsis  -Continue metoprolol PRN q 6 hrs if HR > 120, hold if systolic BP < 90  -Monitor vital signs    Coffee ground emesis  Assessment & Plan  Patient with Atrial  fibrillation on eliquis, no recent use of NSAID, present with dark brown emesis, part in the PEG tube  No documenation of dizziness or syncope  Patient is conscious although demented, no other visible sources of bleeding, rectal exam denied at ER  Hb was 11 2  rule out an upper GI bleed as complication of PEG tube  Differential; esophagitis or gastritis  FOBT was positive but could be secondary to hemorrhoids  Hgb remains stable    Plan  Gastroenterology recommends holding off on EGD for now unless Hgb drops and signs of obvious bleeding present  Diet: Tube feeds with no orals  Continue pantoprazole 40 mg BID  Ringers lactate 100cc/hour  Continue to monitor Hgb  Continue to hold eliquis    Stage IV pressure ulcer (HCC)  Assessment & Plan  Continue care plan    Skin care plans:  1-Hydraguard to bilateral buttock and heels BID and PRN  2-Elevate heels to offload pressure  3-Ehob cushion in chair when out of bed  4-Moisturize skin daily with skin nourishing cream   5-Turn/reposition q2h for pressure re-distribution on skin    6-Mid sacral wound- Cleanse wound with Dakin's solution  Then rinse with NSS, pat dry  Apply nickel thick layer of santyl to wound bed, then pack wound lightly with plain packing ribbon  Cover with 4x4, and then Allevyn foam dressing  Change daily and as needed for soilage/dislodgment  7-Left hip and right heel- Cleanse wounds with soap and water, pat dry  Apply Allevyn foam dressing over wounds  Edwin w/T and date  Change every 3 days and as needed  Peel back and check skin integrity every shift   8-P-500 low air loss mattress    Wound care following        Dementia without behavioral disturbance (HCC)  Assessment & Plan  No change from patients baseline  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, bedbound status, and resistance to mobility  Agitated behavior is consistent with baseline per patient's son due to bedridden status for past 11 months    Plan  Fall precautions  Care as needed      VTE Pharmacologic Prophylaxis: VTE Score: 7 High Risk (Score >/= 5) - Pharmacological DVT Prophylaxis Contraindicated  Sequential Compression Devices Ordered  Patient Centered Rounds: I performed bedside rounds with nursing staff today  Discussions with Specialists or Other Care Team Provider: Gastroenterology    Education and Discussions with Family / Patient: Updated  (son) via phone  Current Length of Stay: 2 day(s)  Current Patient Status: Inpatient   Discharge Plan: Anticipate discharge in 24-48 hrs to rehab facility  Code Status: Level 1 - Full Code    Subjective:   Patient seen and examined at the bedside this morning  No acute events overnight per nurse  Patient was screaming erratically all night in setting of advanced dementia and continues to do so  She denies chest pain, SOB, N/V/D, constipation, lightheadedness, dizziness, and headaches  No active bleeding  Objective:     Vitals:   Temp (24hrs), Av 7 °F (36 5 °C), Min:97 1 °F (36 2 °C), Max:98 1 °F (36 7 °C)    Temp:  [97 1 °F (36 2 °C)-98 1 °F (36 7 °C)] 97 9 °F (36 6 °C)  HR:  [102-121] 102  Resp:  [16-18] 17  BP: (111-128)/() 126/76  SpO2:  [94 %-97 %] 94 %  There is no height or weight on file to calculate BMI  Input and Output Summary (last 24 hours): Intake/Output Summary (Last 24 hours) at 2022 1416  Last data filed at 2022 0157  Gross per 24 hour   Intake 422 5 ml   Output --   Net 422 5 ml       Physical Exam:   Physical Exam  HENT:      Head: Normocephalic and atraumatic  Cardiovascular:      Rate and Rhythm: Normal rate and regular rhythm  Pulses: Normal pulses  Heart sounds: Normal heart sounds  No murmur heard  Pulmonary:      Effort: Pulmonary effort is normal       Breath sounds: Normal breath sounds  No wheezing, rhonchi or rales     Abdominal: General: Abdomen is flat  Bowel sounds are normal  There is no distension  Tenderness: There is no abdominal tenderness  Musculoskeletal:      Right lower leg: No edema  Left lower leg: No edema  Skin:     General: Skin is warm and dry  Neurological:      Mental Status: She is alert  Mental status is at baseline  Psychiatric:         Behavior: Behavior normal           Additional Data:     Labs:  Results from last 7 days   Lab Units 07/21/22  0518 07/20/22  0627   WBC Thousand/uL 5 07 7 14   HEMOGLOBIN g/dL 8 6* 8 8*   HEMATOCRIT % 27 0* 26 0*   PLATELETS Thousands/uL 315 394*   NEUTROS PCT %  --  68   LYMPHS PCT %  --  19   MONOS PCT %  --  9   EOS PCT %  --  2     Results from last 7 days   Lab Units 07/21/22  0518   SODIUM mmol/L 134*   POTASSIUM mmol/L 3 8   CHLORIDE mmol/L 104   CO2 mmol/L 25   BUN mg/dL 15   CREATININE mg/dL 0 39*   ANION GAP mmol/L 5   CALCIUM mg/dL 8 1*   ALBUMIN g/dL 2 0*   TOTAL BILIRUBIN mg/dL 0 42   ALK PHOS U/L 126*   ALT U/L 69*   AST U/L 84*   GLUCOSE RANDOM mg/dL 127                 Results from last 7 days   Lab Units 07/19/22  0032   LACTIC ACID mmol/L 1 4       Lines/Drains:  Invasive Devices  Report    Peripheral Intravenous Line  Duration           Peripheral IV 07/21/22 Left Antecubital <1 day          Drain  Duration           Gastrostomy/Enterostomy Percutaneous Endoscopic Gastrostomy (PEG) 20 Fr  LUQ 19 days                      Imaging: Reviewed radiology reports from this admission including: ultrasound(s)    Recent Cultures (last 7 days):   Results from last 7 days   Lab Units 07/19/22  0032   BLOOD CULTURE  Staphylococcus coagulase negative*  No Growth at 48 hrs     GRAM STAIN RESULT  Gram positive cocci in clusters*       Last 24 Hours Medication List:   Current Facility-Administered Medications   Medication Dose Route Frequency Provider Last Rate    acetaminophen  650 mg Oral Q6H PRN Paulina Muñoz MD      acetaminophen  975 mg Oral Q8H Jeremiah Dennis MD      cefTRIAXone  1,000 mg Intravenous Q24H Ruthie Garcia MD 1,000 mg (07/21/22 0016)    HYDROmorphone  0 2 mg Intravenous Q4H PRN Ruthie Garcia MD      metoprolol  2 5 mg Intravenous Q6H PRN Zeenat Cox MD      metroNIDAZOLE  500 mg Intravenous Q8H Ruthie Garcia  mg (07/21/22 1234)    MIGUEL ANTIFUNGAL   Topical BID Zeenat Cox MD      multivitamin stress formula  1 tablet Oral Daily Ruthie Garcia MD      oxyCODONE  2 5 mg Oral Q4H PRN Paulina Casarez MD      oxyCODONE  5 mg Oral Q4H PRN Ruthie Garcia MD      pantoprazole  40 mg Intravenous Q12H Rita David MD          Today, Patient Was Seen By: Pippa Magana DO

## 2022-07-21 NOTE — ASSESSMENT & PLAN NOTE
Patient is an 81year-old with history of atrial fibrillation on elliquis, dementia, has PEG tube in for severe calorie malnutrition, who was brought to the ED for dark brown emesis with some present in the PEG tube  Physical examination showed she was tachycardic, afebrile, with right upper tenderness  Blood work revealed leucocytosis with neutrophilic predominance, elevated liver enzymes  Ultrasound of the abdomen showed, gall stone with slurge, distended gall bladder with bladder wall thickening, sonographic sawant's sound which are alll consistent with acute cholecystitis  Sepsis, POA (tachycardic at 1:11 a m , leukocytosis of 12 17, possible infectious etiology treated with IV ceftriaxone and Flagyl)    Plan  -Surgery consulted patient- advice conservative management with antibiotics, poor surgical candidate, if no improvement and clinical deterioration surgical team will contact IR for percutaneous cholecystectomy tube placement  -Antibiotics;  Ceftiaxone 1g, IV every 24 hours, Metronidazole 500 mg IV, every 8 hours  -Hydration, Ringer lactate 100cc/hour   -Geriatric pain management   Acetaminophen 975 mg, oral every 8 hours  Oxycodone 2 5 mg oral, every 4 hours for moderate pain  Oxycodone 5 mg oral, every 4 hours for severe pain  Hydromorphone 0 2mg IV every 4 hours for breakthrough pain  -NPO with tube feeds  -GI evaluated the patient and stated no indication for ERCP, may consider EGD if Hgb drops and patient has signs of active bleeding  -Had some episodes of elevated heart rates to the 120s, may be 2/2 to pain and setting of sepsis  -Continue metoprolol PRN q 6 hrs if HR > 371, hold if systolic BP < 90  -Monitor vital signs

## 2022-07-21 NOTE — CASE MANAGEMENT
Case Management Progress Note    Patient name Douglas Ego  Location W /W -68 MRN [id number]  : 1935 Date 2022       LOS (days): 2  Geometric Mean LOS (GMLOS) (days): 4 80  Days to GMLOS:2 1        OBJECTIVE:        Current admission status: Inpatient  Preferred Pharmacy:   [address] [place] [address] [address] [place] [address]  Phone: [phone] Fax: [phone]    Primary Care Provider: Alf Reardon    Primary Insurance: MEDICARE  Secondary Insurance: [id number]    PROGRESS NOTE:    Patient anticipated to return to [facility]  Wound care and nutrition notes forwarded via [address]  TT [facility] Alessandra, [facility] re: recommendation for low-loss air mattress and she confirmed facility will arrange for one prior to return if she doesn't already have one in her room  Alessandra aware of anticipated d/c for tomorrow and confirmed acceptance  No COVID test needed       CM discussed goals of care with primary provider based on scores of Goals of Care systems list

## 2022-07-21 NOTE — OCCUPATIONAL THERAPY NOTE
Occupational Therapy Screen Note        Patient Name: Anitha Rojas  JIHJORENAY Date: 7/21/2022 07/21/22 1059   OT Last Visit   OT Visit Date 07/21/22   Note Type   Note type Screen   Additional Comments OT orders received and chart review conducted  Spoke to Karin Ignacio who reports that pt is bedbound and dependent with ADLw & IADLs at baseline  Pt is currently functioning at functional baseline  Pt will be d/c from OT caseload at this time  If additional concerns arise, please re-consult for skilled OT services       ESTER Wu

## 2022-07-21 NOTE — PLAN OF CARE
Problem: MOBILITY - ADULT  Goal: Maintain or return to baseline ADL function  Description: INTERVENTIONS:  -  Assess patient's ability to carry out ADLs; assess patient's baseline for ADL function and identify physical deficits which impact ability to perform ADLs (bathing, care of mouth/teeth, toileting, grooming, dressing, etc )  - Assess/evaluate cause of self-care deficits   - Assess range of motion  - Assess patient's mobility; develop plan if impaired  - Assess patient's need for assistive devices and provide as appropriate  - Encourage maximum independence but intervene and supervise when necessary  - Involve family in performance of ADLs  - Assess for home care needs following discharge   - Consider OT consult to assist with ADL evaluation and planning for discharge  - Provide patient education as appropriate  Outcome: Progressing  Goal: Maintains/Returns to pre admission functional level  Description: INTERVENTIONS:  - Perform BMAT or MOVE assessment daily    - Set and communicate daily mobility goal to care team and patient/family/caregiver  - Collaborate with rehabilitation services on mobility goals if consulted  - Perform Range of Motiontimes a day  - Reposition patient every hours    - Dangle patient  times a day  - Stand patient times a day  - Ambulate patient  times a day  - Out of bed to chair  times a day   - Out of bed for meals  times a day  - Out of bed for toileting  - Record patient progress and toleration of activity level   Outcome: Progressing     Problem: Prexisting or High Potential for Compromised Skin Integrity  Goal: Skin integrity is maintained or improved  Description: INTERVENTIONS:  - Identify patients at risk for skin breakdown  - Assess and monitor skin integrity  - Assess and monitor nutrition and hydration status  - Monitor labs   - Assess for incontinence   - Turn and reposition patient  - Assist with mobility/ambulation  - Relieve pressure over bony prominences  - Avoid friction and shearing  - Provide appropriate hygiene as needed including keeping skin clean and dry  - Evaluate need for skin moisturizer/barrier cream  - Collaborate with interdisciplinary team   - Patient/family teaching  - Consider wound care consult   Outcome: Progressing     Problem: Nutrition/Hydration-ADULT  Goal: Nutrient/Hydration intake appropriate for improving, restoring or maintaining nutritional needs  Description: Monitor and assess patient's nutrition/hydration status for malnutrition  Collaborate with interdisciplinary team and initiate plan and interventions as ordered  Monitor patient's weight and dietary intake as ordered or per policy  Utilize nutrition screening tool and intervene as necessary  Determine patient's food preferences and provide high-protein, high-caloric foods as appropriate       INTERVENTIONS:  - Monitor oral intake, urinary output, labs, and treatment plans  - Assess nutrition and hydration status and recommend course of action  - Evaluate amount of meals eaten  - Assist patient with eating if necessary   - Allow adequate time for meals  - Recommend/ encourage appropriate diets, oral nutritional supplements, and vitamin/mineral supplements  - Order, calculate, and assess calorie counts as needed  - Recommend, monitor, and adjust tube feedings and TPN/PPN based on assessed needs  - Assess need for intravenous fluids  - Provide specific nutrition/hydration education as appropriate  - Include patient/family/caregiver in decisions related to nutrition  Outcome: Progressing     Problem: Potential for Falls  Goal: Patient will remain free of falls  Description: INTERVENTIONS:  - Educate patient/family on patient safety including physical limitations  - Instruct patient to call for assistance with activity   - Consult OT/PT to assist with strengthening/mobility   - Keep Call bell within reach  - Keep bed low and locked with side rails adjusted as appropriate  - Keep care items and personal belongings within reach  - Initiate and maintain comfort rounds  - Make Fall Risk Sign visible to staff  - Offer Toileting every Hours, in advance of need  - Initiate/Maintainalarm  - Obtain necessary fall risk management equipment:   - Apply yellow socks and bracelet for high fall risk patients  - Consider moving patient to room near nurses station  Outcome: Progressing

## 2022-07-21 NOTE — PROGRESS NOTES
Progress Note - General Surgery   Coretta Forbes 81 y o  female MRN: [id number]  Unit/Bed#: W -01 Encounter: [id number]    Assessment:  81 y o  female w/ acute cholecystitis, failure to thrive, and caloric malnutrition with coffee-ground emesis prior to admission  Afebrile  VSS  Plan:  - Diet NPO  - Pain and Nausea control PRN  - Incentive spirometry  - OOB, ambulate  - follow-up labs  - Continue tube feeds  - Continue antibiotics  - GI for EGD if any signs of rebleeding    Subjective/Objective     Subjective: Patient tends to respond with binary yes/no answers  When specifically asked, she denies nausea/vomiting/diarrhea and says she may have possibly had a bowel movement and may have possibly passed flatus  She also denies fevers and chills and states she has no pain at this time  She does not have any complaints at this time  Objective:     Blood pressure 111/67, pulse (!) 107, temperature 98 1 °F (36 7 °C), temperature source Oral, resp  rate 16, SpO2 97 %  ,There is no height or weight on file to calculate BMI  Intake/Output Summary (Last 24 hours) at 7/21/2022 0714  Last data filed at 7/21/2022 0157  Gross per 24 hour   Intake 1977 5 ml   Output --   Net 1977 5 ml       Invasive Devices  Report    Peripheral Intravenous Line  Duration           Peripheral IV 07/20/22 Right Antecubital <1 day          Drain  Duration           Gastrostomy/Enterostomy Percutaneous Endoscopic Gastrostomy (PEG) 20 Fr  LUQ 18 days                Physical Exam  Vitals and nursing note reviewed  Constitutional:       Appearance: She is well-developed  HENT:      Head: Normocephalic and atraumatic  Eyes:      Conjunctiva/sclera: Conjunctivae normal    Cardiovascular:      Rate and Rhythm: Normal rate and regular rhythm  Heart sounds: No murmur heard  Pulmonary:      Effort: Pulmonary effort is normal  No respiratory distress  Breath sounds: Normal breath sounds     Abdominal:      General: There is distension  Tenderness: There is no abdominal tenderness  Musculoskeletal:         General: Normal range of motion  Cervical back: Neck supple  Skin:     General: Skin is warm and dry  Neurological:      Comments: Oriented to person only, severe dementia            Scheduled Meds:  Current Facility-Administered Medications   Medication Dose Route Frequency Provider Last Rate    acetaminophen  650 mg Oral Q6H PRN Paulina Josue MD      acetaminophen  975 mg Oral Q8H [facility] Romy Barnard MD      cefTRIAXone  1,000 mg Intravenous Q24H Romy Barnard MD 1,000 mg (07/21/22 0016)    HYDROmorphone  0 2 mg Intravenous Q4H PRN Romy Barnard MD      metoprolol  2 5 mg Intravenous Q6H PRN Katheryn Arshad MD      metroNIDAZOLE  500 mg Intravenous Q8H Romy Barnard  mg (07/21/22 0157)    MIGUEL ANTIFUNGAL   Topical BID Katheryn Arshad MD      multivitamin stress formula  1 tablet Oral Daily Romy Barnard MD      oxyCODONE  2 5 mg Oral Q4H PRN Paulina Josue MD      oxyCODONE  5 mg Oral Q4H PRN Romy Barnard MD      pantoprazole  40 mg Intravenous Q12H Celeste Garcia MD       Continuous Infusions:   PRN Meds:   acetaminophen    HYDROmorphone    metoprolol    oxyCODONE    oxyCODONE      Lab, Imaging and other studies:  I have personally reviewed pertinent lab results    , CBC:   Lab Results   Component Value Date    WBC 5 07 07/21/2022    HGB 8 6 (L) 07/21/2022    HCT 27 0 (L) 07/21/2022     (H) 07/21/2022     07/21/2022    MCH 33 1 07/21/2022    MCHC 31 9 07/21/2022    RDW 18 9 (H) 07/21/2022    MPV 9 1 07/21/2022    NRBC 0 07/20/2022   , CMP:   Lab Results   Component Value Date    SODIUM 135 07/20/2022    K 3 3 (L) 07/20/2022     07/20/2022    CO2 28 07/20/2022    BUN 14 07/20/2022    CREATININE 0 32 (L) 07/20/2022    CALCIUM 8 0 (L) 07/20/2022    AST 150 (H) 07/20/2022    ALT 96 (H) 07/20/2022    ALKPHOS 151 (H) 07/20/2022    EGFR 102 07/20/2022   , Coagulation: No results found for: PT, INR, APTT, Urinalysis: No results found for: COLORU, CLARITYU, SPECGRAV, PHUR, LEUKOCYTESUR, NITRITE, PROTEINUA, GLUCOSEU, KETONESU, BILIRUBINUR, BLOODU, Amylase: No results found for: AMYLASE, Lipase: No results found for: LIPASE  VTE Pharmacologic Prophylaxis: Venodyne  VTE Mechanical Prophylaxis: sequential compression device      Ten Danielson MD  7/21/2022 6:11 AM

## 2022-07-21 NOTE — DISCHARGE INSTR - OTHER ORDERS
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

## 2022-07-21 NOTE — ASSESSMENT & PLAN NOTE
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

## 2022-07-21 NOTE — ASSESSMENT & PLAN NOTE
Continue care plan    Skin care plans:  1-Hydraguard to bilateral buttock and heels BID and PRN  2-Elevate heels to offload pressure  3-Ehob cushion in chair when out of bed  4-Moisturize skin daily with skin nourishing cream   5-Turn/reposition q2h for pressure re-distribution on skin    6-Mid sacral wound- Cleanse wound with Dakin's solution  Then rinse with NSS, pat dry  Apply nickel thick layer of santyl to wound bed, then pack wound lightly with plain packing ribbon  Cover with 4x4, and then Allevyn foam dressing  Change daily and as needed for soilage/dislodgment  7-Left hip and right heel- Cleanse wounds with soap and water, pat dry  Apply Allevyn foam dressing over wounds  Edwin w/T and date  Change every 3 days and as needed  Peel back and check skin integrity every shift   8-P-500 low air loss mattress    Wound care following

## 2022-07-21 NOTE — CONSULTS
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  No bone visible  Measures approximately 2 5 x 1 8 x 1 9 cm  Undermines from 9-1 o'clock  Deepest point is 1 6 cm  No odor  Moderate amount of drainage  Periwound with cutaneous fungal infection  Right posterior heel with 0 1 x 0 1 area of intact DTPI, POA  Incontinent of stool and urine  Purewick in use  Wound/Skin Care Plan:   1  P-500 low air loss air mattress ordered  2  For 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, irrigate wound with syringe of normal saline  Loosely pack wound with Aquacel AG ribbon  Be sure to pack into undermined areas from 9-1 o'clock  Apply Cavilon barrier film around wound and then cover with a small bordered foam dressing  Then apply Lawson Antonietta around the Allevyn bordered foam dressing to the cutaneous fungal infection  Change once a day and as needed due to soilage  3  Frequently turn and reposition patient  4  Heel lift boots on both heels  5  Moisturize skin each day  6  Pressure redistribution cushion to chair  7  Wound care nurse follow up  Discussed with DAWIT Alejo; Dr Donovan Connor; BRAXTON Pham

## 2022-07-21 NOTE — PHYSICAL THERAPY NOTE
PHYSICAL THERAPY NOTE          Patient Name: Helene Birmingham  KRTYV'K Date: 7/21/2022 07/21/22 1100   PT Last Visit   PT Visit Date 07/21/22   Activity Tolerance   Medical Staff Made Aware OT BRAXTON Kilgore   Assessment   Assessment Spoke to [facility] Katie Vo who updated PT/OT confirming pt is bedbound and dependent for functional mobility  Pt required Dependent Ax2 for bilateral rolling bed mobility during PT eval performed 7/19, and thus is currently at mobility baseline  Will DC pt from IPPT caseload due to pt w/o skilled PT services           Danielle Medellin, PT, DPT  07/21/22

## 2022-07-21 NOTE — PROGRESS NOTES
Progress Note - Aneesh Downing 81 y o  female MRN: [id number]    Unit/Bed#: W -01 Encounter: [id number]    Assessment and Plan:   Principal Problem:    Acute cholecystitis due to biliary calculus  Active Problems:    Dementia without behavioral disturbance (HCC)    Stage IV pressure ulcer (HCC)    Coffee ground emesis    1  Acute cholecystitis due to biliary calculus  - Pt presented with dark brown emesis at nursing facility   - RUQ USG showed a large gallstone and sludge within a distended gallbladder   - General surgery consulted and recommendations appreciated  - Right upper tenderness + on physical exam on admission   - Patient has a high risks of surgery, does will continue antibiotic at this time    - Monitor symptoms   - Pt is stable on 07/20/22  - No plan for ERCP from GI team at present         2  Coffee-ground emesis  - patient presented with coffee-ground emesis  - no overnight event and coffee-ground emesis  - hemoglobin stable at 11 3 this morning   - Monitor symptoms and will consider EGD if Hb drops or has signs of overt bleeding  - Since her Hb is stable , no plan for EGD today    3  Tansaminitis   Lab Results   Component Value Date    AST 84 (H) 07/21/2022     (H) 07/20/2022    ALT 69 (H) 07/21/2022    ALT 96 (H) 07/20/2022    ALKPHOS 126 (H) 07/21/2022    ALKPHOS 151 (H) 07/20/2022    TBILI 0 42 07/21/2022    TBILI 0 75 07/20/2022     - Trending down liver enzymes  - Monitor symptoms and daily labs  Summary of recommendation  - continue tube feeding   - continue antibiotics Rocephin and Flagyl  - monitor hemoglobin and symptoms   - Patient has a high risks of surgery, thus will continue conservative management at this time    - No plan for ERCP/EGD at present     ---------------------------------------------------------------------------------------------------------    Subjective:     No significant overnight event   Patient denied abdominal pain, nausea, vomiting, fever, chills  Patient is receiving nutrition via PEG tube  No coffee-ground emesis, no bloody bowel movement reported per nursing staff  Objective:     Vitals: Blood pressure 126/76, pulse 102, temperature 97 9 °F (36 6 °C), resp  rate 17, SpO2 94 %  ,There is no height or weight on file to calculate BMI  Intake/Output Summary (Last 24 hours) at 7/21/2022 1044  Last data filed at 7/21/2022 0157  Gross per 24 hour   Intake 1977 5 ml   Output --   Net 1977 5 ml       Physical Exam:     General Appearance: Alert,  Demanded  Oriented to self only  Lungs: Clear to auscultation bilaterally, no rales or rhonchi, no labored breathing/accessory muscle use  Heart: Regular rate and rhythm, S1, S2 normal, no murmur, click, rub or gallop  Abdomen: Soft, non-tender, non-distended; bowel sounds normal; no masses or no organomegaly  Skin:  No pallor, no jaundice  Extremities: No cyanosis, clubbing, or edema    Invasive Devices  Report    Peripheral Intravenous Line  Duration           Peripheral IV 07/20/22 Right Antecubital 1 day          Drain  Duration           Gastrostomy/Enterostomy Percutaneous Endoscopic Gastrostomy (PEG) 20 Fr  LUQ 18 days                Lab Results:  Results from last 7 days   Lab Units 07/21/22  0518 07/20/22  0627   WBC Thousand/uL 5 07 7 14   HEMOGLOBIN g/dL 8 6* 8 8*   HEMATOCRIT % 27 0* 26 0*   PLATELETS Thousands/uL 315 394*   NEUTROS PCT %  --  68   LYMPHS PCT %  --  19   MONOS PCT %  --  9   EOS PCT %  --  2     Results from last 7 days   Lab Units 07/21/22  0518   POTASSIUM mmol/L 3 8   CHLORIDE mmol/L 104   CO2 mmol/L 25   BUN mg/dL 15   CREATININE mg/dL 0 39*   CALCIUM mg/dL 8 1*   ALK PHOS U/L 126*   ALT U/L 69*   AST U/L 84*     Invalid input(s): BILI            Imaging Studies: I have personally reviewed pertinent imaging studies  US right upper quadrant    Result Date: 7/18/2022  Impression: Again noted is a large gallstone and sludge within a distended gallbladder    There is mild gallbladder wall thickening  Sonographic Benítez sign reported as positive  Findings are suggestive of acute cholecystitis  Mild hepatomegaly  The study was marked in [facility] for immediate notification   Workstation performed: [id number]

## 2022-07-22 ENCOUNTER — TELEPHONE (OUTPATIENT)
Dept: OTHER | Facility: OTHER | Age: 87
End: 2022-07-22

## 2022-07-22 VITALS
DIASTOLIC BLOOD PRESSURE: 74 MMHG | RESPIRATION RATE: 16 BRPM | HEART RATE: 101 BPM | OXYGEN SATURATION: 97 % | TEMPERATURE: 97.8 F | SYSTOLIC BLOOD PRESSURE: 126 MMHG

## 2022-07-22 LAB
ANION GAP SERPL CALCULATED.3IONS-SCNC: 4 MMOL/L (ref 4–13)
BACTERIA BLD CULT: ABNORMAL
BASOPHILS # BLD AUTO: 0.05 THOUSANDS/ΜL (ref 0–0.1)
BASOPHILS NFR BLD AUTO: 1 % (ref 0–1)
BUN SERPL-MCNC: 15 MG/DL (ref 5–25)
CALCIUM SERPL-MCNC: 7.8 MG/DL (ref 8.4–10.2)
CHLORIDE SERPL-SCNC: 102 MMOL/L (ref 96–108)
CO2 SERPL-SCNC: 27 MMOL/L (ref 21–32)
CREAT SERPL-MCNC: 0.34 MG/DL (ref 0.6–1.3)
EOSINOPHIL # BLD AUTO: 0.17 THOUSAND/ΜL (ref 0–0.61)
EOSINOPHIL NFR BLD AUTO: 3 % (ref 0–6)
ERYTHROCYTE [DISTWIDTH] IN BLOOD BY AUTOMATED COUNT: 18.7 % (ref 11.6–15.1)
GFR SERPL CREATININE-BSD FRML MDRD: 100 ML/MIN/1.73SQ M
GLUCOSE SERPL-MCNC: 113 MG/DL (ref 65–140)
GRAM STN SPEC: ABNORMAL
HCT VFR BLD AUTO: 27.5 % (ref 34.8–46.1)
HGB BLD-MCNC: 9 G/DL (ref 11.5–15.4)
IMM GRANULOCYTES # BLD AUTO: 0.12 THOUSAND/UL (ref 0–0.2)
IMM GRANULOCYTES NFR BLD AUTO: 2 % (ref 0–2)
LYMPHOCYTES # BLD AUTO: 1.36 THOUSANDS/ΜL (ref 0.6–4.47)
LYMPHOCYTES NFR BLD AUTO: 21 % (ref 14–44)
MCH RBC QN AUTO: 33.1 PG (ref 26.8–34.3)
MCHC RBC AUTO-ENTMCNC: 32.7 G/DL (ref 31.4–37.4)
MCV RBC AUTO: 101 FL (ref 82–98)
MECA+MECC ISLT/SPM QL: DETECTED
MONOCYTES # BLD AUTO: 0.61 THOUSAND/ΜL (ref 0.17–1.22)
MONOCYTES NFR BLD AUTO: 9 % (ref 4–12)
NEUTROPHILS # BLD AUTO: 4.23 THOUSANDS/ΜL (ref 1.85–7.62)
NEUTS SEG NFR BLD AUTO: 64 % (ref 43–75)
NRBC BLD AUTO-RTO: 0 /100 WBCS
PLATELET # BLD AUTO: 349 THOUSANDS/UL (ref 149–390)
PMV BLD AUTO: 9.1 FL (ref 8.9–12.7)
POTASSIUM SERPL-SCNC: 3.9 MMOL/L (ref 3.5–5.3)
RBC # BLD AUTO: 2.72 MILLION/UL (ref 3.81–5.12)
S EPIDERMIDIS DNA BLD POS QL NAA+NON-PRB: DETECTED
SODIUM SERPL-SCNC: 133 MMOL/L (ref 135–147)
WBC # BLD AUTO: 6.54 THOUSAND/UL (ref 4.31–10.16)

## 2022-07-22 PROCEDURE — C9113 INJ PANTOPRAZOLE SODIUM, VIA: HCPCS | Performed by: INTERNAL MEDICINE

## 2022-07-22 PROCEDURE — 85025 COMPLETE CBC W/AUTO DIFF WBC: CPT

## 2022-07-22 PROCEDURE — 99232 SBSQ HOSP IP/OBS MODERATE 35: CPT | Performed by: INTERNAL MEDICINE

## 2022-07-22 PROCEDURE — 80048 BASIC METABOLIC PNL TOTAL CA: CPT

## 2022-07-22 PROCEDURE — 99239 HOSP IP/OBS DSCHRG MGMT >30: CPT | Performed by: INTERNAL MEDICINE

## 2022-07-22 RX ORDER — AMOXICILLIN AND CLAVULANATE POTASSIUM 200; 28.5 MG/5ML; MG/5ML
25 POWDER, FOR SUSPENSION ORAL 2 TIMES DAILY
Qty: 252 ML | Refills: 0 | Status: SHIPPED | OUTPATIENT
Start: 2022-07-22 | End: 2022-07-28

## 2022-07-22 RX ADMIN — METRONIDAZOLE 500 MG: 500 INJECTION, SOLUTION INTRAVENOUS at 02:39

## 2022-07-22 RX ADMIN — PANTOPRAZOLE SODIUM 40 MG: 40 INJECTION, POWDER, FOR SOLUTION INTRAVENOUS at 10:40

## 2022-07-22 RX ADMIN — CEFTRIAXONE 1000 MG: 1 INJECTION, SOLUTION INTRAVENOUS at 00:25

## 2022-07-22 RX ADMIN — ACETAMINOPHEN 975 MG: 325 TABLET, FILM COATED ORAL at 05:00

## 2022-07-22 RX ADMIN — MICONAZOLE NITRATE 1 APPLICATION: 20 CREAM TOPICAL at 10:41

## 2022-07-22 RX ADMIN — METRONIDAZOLE 500 MG: 500 INJECTION, SOLUTION INTRAVENOUS at 10:41

## 2022-07-22 RX ADMIN — OXYCODONE HYDROCHLORIDE 5 MG: 5 TABLET ORAL at 11:29

## 2022-07-22 RX ADMIN — ACETAMINOPHEN 975 MG: 325 TABLET, FILM COATED ORAL at 13:53

## 2022-07-22 RX ADMIN — B-COMPLEX W/ C & FOLIC ACID TAB 1 TABLET: TAB at 10:40

## 2022-07-22 NOTE — ASSESSMENT & PLAN NOTE
Patient with Atrial  fibrillation on eliquis, no recent use of NSAID, present with dark brown emesis, part in the PEG tube  No documenation of dizziness or syncope  Patient is conscious although demented, no other visible sources of bleeding, rectal exam denied at ER   Hb was 11 2  rule out an upper GI bleed as complication of PEG tube  Differential; esophagitis or gastritis  FOBT was positive but could be secondary to hemorrhoids  Gastroenterology recommends holding off on EGD unless Hgb drops and signs of obvious bleeding present  Hgb remains stable    Plan  Diet: Tube feeds  Continue to monitor Hgb

## 2022-07-22 NOTE — PROGRESS NOTES
Progress Note - Coretta Forbes 81 y o  female MRN: [id number]    Unit/Bed#: W -01 Encounter: [id number]    Assessment and Plan:   Principal Problem:    Acute cholecystitis due to biliary calculus  Active Problems:    Dementia without behavioral disturbance (HCC)    Stage IV pressure ulcer (HCC)    Coffee ground emesis    1  Acute cholecystitis due to biliary calculus  - Pt presented with dark brown emesis at nursing facility   - RUQ USG showed a large gallstone and sludge within a distended gallbladder   - General surgery consulted and recommendations appreciated  - Right upper tenderness + on physical exam on admission   - Patient has a high risks of surgery, does will continue conservative management with antibiotic at this time    - Monitor symptoms   - Continue TF   - No plan for ERCP from GI team at present      2  Coffee-ground emesis  - no overnight event and coffee-ground emesis  - hemoglobin stable ( 8 8,8 6,9 0)  - Monitor symptoms and will consider EGD if Hb drops or has signs of overt bleeding  - Continue PPI IV Q12 Hr  - Since her Hb is stable , no plan for EGD today     3  Tansaminitis   - Trending down liver enzymes  - Monitor symptoms and daily labs       Summary of recommendation  - continue tube feeding   - continue antibiotics Rocephin and Flagyl  - monitor hemoglobin and symptoms   - Patient has a high risks of surgery, thus will continue conservative management at this time    - Continue PPI IV Q12 Hr  - Continue TF   - No plan for ERCP/EGD at present     ---------------------------------------------------------------------------------------------------------    Subjective:     No significant overnight event  No coffee-ground emesis, no bloody bowel movement reported per nursing staff  Her urine is dark brown color  Pt was yelling frequently and said that she has rt foot pain  Last BM on 7/20/22      Objective:     Vitals: Blood pressure 126/74, pulse 101, temperature 97 8 °F (36 6 °C), temperature source Oral, resp  rate 16, SpO2 97 %  ,There is no height or weight on file to calculate BMI  Intake/Output Summary (Last 24 hours) at 7/22/2022 1116  Last data filed at 7/22/2022 0900  Gross per 24 hour   Intake 537 ml   Output 200 ml   Net 337 ml       Physical Exam:     General Appearance: Demented and not cooperative  Lungs: Clear to auscultation bilaterally, no rales or rhonchi, no labored breathing/accessory muscle use  Heart: Regular rate and rhythm, S1, S2 normal, no murmur, click, rub or gallop  Abdomen: Soft, non-tender, non-distended; bowel sounds normal; no masses or no organomegaly  Skin: No pallor, no jaundice  Extremities: No cyanosis, clubbing, or edema    Invasive Devices  Report    Peripheral Intravenous Line  Duration           Peripheral IV 07/21/22 Left Antecubital <1 day          Drain  Duration           Gastrostomy/Enterostomy Percutaneous Endoscopic Gastrostomy (PEG) 20 Fr  LUQ 19 days                Lab Results:  Results from last 7 days   Lab Units 07/22/22  0454   WBC Thousand/uL 6 54   HEMOGLOBIN g/dL 9 0*   HEMATOCRIT % 27 5*   PLATELETS Thousands/uL 349   NEUTROS PCT % 64   LYMPHS PCT % 21   MONOS PCT % 9   EOS PCT % 3     Results from last 7 days   Lab Units 07/22/22  0454 07/21/22  0518   POTASSIUM mmol/L 3 9 3 8   CHLORIDE mmol/L 102 104   CO2 mmol/L 27 25   BUN mg/dL 15 15   CREATININE mg/dL 0 34* 0 39*   CALCIUM mg/dL 7 8* 8 1*   ALK PHOS U/L  --  126*   ALT U/L  --  69*   AST U/L  --  84*     Invalid input(s): BILI            Imaging Studies: I have personally reviewed pertinent imaging studies  US right upper quadrant,  Result Date: 7/18/2022  Impression: Again noted is a large gallstone and sludge within a distended gallbladder  There is mild gallbladder wall thickening  Sonographic Benítez sign reported as positive  Findings are suggestive of acute cholecystitis  Mild hepatomegaly  The study was marked in [facility] for immediate notification   Workstation performed: [id number]

## 2022-07-22 NOTE — CASE MANAGEMENT
Case Management Discharge Planning Note    Patient name Caesar Guerra  Location W /W -26 MRN [id number]  : 1935 Date 2022       Current Admission Date: 2022  Current Admission Diagnosis:Acute cholecystitis due to biliary calculus   Patient Active Problem List    Diagnosis Date Noted    Acute cholecystitis due to biliary calculus 2022    Coffee ground emesis 2022    Deep tissue injury 2022    Traumatic injury to skin or subcutaneous tissue 2022    Confused 2022    SIRS (systemic inflammatory response syndrome) ([facility] ) 2022    Stage IV pressure ulcer ([facility] ) 2022    Severe protein-calorie malnutrition ([facility] ) 2022    Electrolyte disturbance 2022    Generalized weakness 2022    DVT (deep venous thrombosis) ([facility] ) 2022    Essential hypertension 2021    Dementia without behavioral disturbance ([facility] [facility] ) 2021      LOS (days): 3  Geometric Mean LOS (GMLOS) (days): 4 80  Days to GMLOS:1 3     OBJECTIVE:  Risk of Unplanned Readmission Score: 15 76         Current admission status: Inpatient   Preferred Pharmacy:   [address] [place] [address]  Phone: [phone] Fax: [phone]    Primary Care Provider: Emy Quintanilla    Primary Insurance: MEDICARE  Secondary Insurance: [address] [place], [place] [address] DETAILS:    Discharge planning discussed with[de-identified] call made to Iman castillo ([phone]);  left  Freedom of Choice: Yes (re: rehab facility)  Comments - [place] of Choice: Alana Solorzano CM contacted family/caregiver?: Yes  Were Treatment Team discharge recommendations reviewed with patient/caregiver?: Yes  Did patient/caregiver verbalize understanding of patient care needs?: N/A- going to facility  Were patient/caregiver advised of the risks associated with not following Treatment Team discharge recommendations?: Yes    Contacts  Patient Contacts: anna Reyes  Relationship to Patient[de-identified] Family  Contact Method: Phone  Phone Number: [phone]  Reason/Outcome: Continuity of Care, Emergency Contact, Referral, Discharge Planning              Other Referral/Resources/Interventions Provided:  Interventions: SNF, Short Term Rehab  Referral Comments: Per MD, patient medically clear for d/c today  Transport arranged for 1500 via [employer] for patient to return to [facility]  Call made to patient's son, Bibiana Ann, and VM left relaying same and requesting return call to confirm  [place] notified of pick-up time via [facility]; AVS forwarded to them for their records                          [facility]): [employer] Name, [address] : [address] [facility]  Receiving Facility/Agency Phone Number: [phone]  Facility/Agency Fax Number: [phone]

## 2022-07-22 NOTE — DISCHARGE INSTR - AVS FIRST PAGE
Dear Araceli Yan,     It was our pleasure to care for you here at [facility], [address]  It is our hope that we were always able to exceed the expected standards for your care during your stay  You were hospitalized due to dark/coffee colored vomit  You were cared for on the 4th floor by Eduardo Laura DO under the service of Brunilda Leyva MD with the [facility] who covers for your primary care physician (PCP), Isis Harley, while you were hospitalized  If you have any questions or concerns related to this hospitalization, you may contact us at [phone]  For follow up as well as any medication refills, we recommend that you follow up with your primary care physician  A registered nurse will reach out to you by phone within a few days after your discharge to answer any additional questions that you may have after going home  However, at this time we provide for you here, the most important instructions / recommendations at discharge:     Notable Medication Adjustments -   Please start taking Augmentin through your PEG tube twice a day for 6 days, ending on July 28th, 2022  Testing Required after Discharge -   CBC and differential in three days, July 45UJ  Basic metabolic panel in three days, July 25th  Important follow up information -   Please follow up with your PCP Isis Harley in one week  Other Instructions -   None  Please review this entire after visit summary as additional general instructions including medication list, appointments, activity, diet, any pertinent wound care, and other additional recommendations from your care team that may be provided for you        Sincerely,     Eduardo Laura DO

## 2022-07-22 NOTE — ASSESSMENT & PLAN NOTE
Continue care plan    Skin care plans:  1-Hydraguard to bilateral buttock and heels BID and PRN  2-Elevate heels to offload pressure  3-Ehob cushion in chair when out of bed  4-Moisturize skin daily with skin nourishing cream   5-Turn/reposition q2h for pressure re-distribution on skin    6-Mid sacral wound- Cleanse wound with Dakin's solution  Then rinse with NSS, pat dry  Apply nickel thick layer of santyl to wound bed, then pack wound lightly with plain packing ribbon  Cover with 4x4, and then Allevyn foam dressing  Change daily and as needed for soilage/dislodgment  7-Left hip and right heel- Cleanse wounds with soap and water, pat dry  Apply Allevyn foam dressing over wounds  Edwin w/T and date  Change every 3 days and as needed  Peel back and check skin integrity every shift   8-P-500 low air loss mattress      Plan:  Continue to monitor

## 2022-07-22 NOTE — CASE MANAGEMENT
Case Management Discharge Planning Note    Patient name Caesar Guerra  Location W /W -47 MRN [id number]  : 1935 Date 2022       Current Admission Date: 2022  Current Admission Diagnosis:Acute cholecystitis due to biliary calculus   Patient Active Problem List    Diagnosis Date Noted    Acute cholecystitis due to biliary calculus 2022    Coffee ground emesis 2022    Deep tissue injury 2022    Traumatic injury to skin or subcutaneous tissue 2022    Confused 2022    SIRS (systemic inflammatory response syndrome) ([facility] ) 2022    Stage IV pressure ulcer ([facility] ) 2022    Severe protein-calorie malnutrition ([facility] ) 2022    Electrolyte disturbance 2022    Generalized weakness 2022    DVT (deep venous thrombosis) ([facility] ) 2022    Essential hypertension 2021    Dementia without behavioral disturbance ([facility] ) 2021      LOS (days): 3  Geometric Mean LOS (GMLOS) (days): 4 80  Days to GMLOS:1 2     OBJECTIVE:  Risk of Unplanned Readmission Score: 15 76         Current admission status: Inpatient   Preferred Pharmacy:   [address] [place] [address]  Phone: [phone] Fax: [phone]    Primary Care Provider: Emy Quintanilla    Primary Insurance: MEDICARE  Secondary Insurance: [address] [place], [place] [address] DETAILS:    Discharge planning discussed with[de-identified] son, Iman Ribera, at bedside  [place] of Choice: Yes (re: SNF)  Comments - Freedom of Choice: confirmed plan to return to [facility] today  CM contacted family/caregiver?: Yes  Were Treatment Team discharge recommendations reviewed with patient/caregiver?: Yes  Did patient/caregiver verbalize understanding of patient care needs?: N/A- going to facility  Were patient/caregiver advised of the risks associated with not following Treatment Team discharge recommendations?: Yes    Contacts  Patient Contacts: anna Abebe  Relationship to Patient[de-identified] Family  Contact Method: In Person  Reason/Outcome: Continuity of Care, Emergency Contact, Referral, Discharge Planning              Other Referral/Resources/Interventions Provided:  Interventions: SNF, Short Term Rehab  Referral Comments: Patient's son at bedside  Discussed patient's discharge for today and return to [address] [place] [address] at 3:00pm  Son confirmed plan for same and denies any concern re: discharge for this afternoon  IMM reviewed which he signed; copy provided  Would you like to participate in our [facility] [address] service program?  : No - Declined    Treatment Team Recommendation: Short Term Rehab, SNF  Discharge Destination Plan[de-identified] Short Term Rehab, SNF ([place])  Transport at Discharge : [facility]  Dispatcher Contacted: Yes  Number/Name of Dispatcher: [facility] via [employer] by Marybel and Unit #): [employer]  ETA of Transport (Date): 07/22/22  ETA of Transport (Time): 1500              IMM Given (Date):: 07/22/22  IMM Given to[de-identified] Family (son, Jaleel Grajeda signed)  Family notified[de-identified] Jaleel castillo  IMM reviewed with patient's son, Jaleel Grajeda, and son agrees with discharge determination          Accepting Facility Name, [facility] : [facility]  Receiving Facility/Agency Phone Number: [phone]  Facility/Agency Fax Number: [phone]

## 2022-07-22 NOTE — ASSESSMENT & PLAN NOTE
Patient is an 81year-old with history of atrial fibrillation on elliquis, dementia, has PEG tube in for severe calorie malnutrition, who was brought to the ED for dark brown emesis with some present in the PEG tube  Physical examination showed she was tachycardic, afebrile, with right upper tenderness  Blood work revealed leucocytosis with neutrophilic predominance, elevated liver enzymes   Ultrasound of the abdomen showed, gall stone with slurge, distended gall bladder with bladder wall thickening, sonographic sawant's sound which are alll consistent with acute cholecystitis  Sepsis, POA (tachycardic at 1:11 a m , leukocytosis of 12 17, possible infectious etiology treated with IV ceftriaxone and Flagyl)  -GI evaluated the patient and stated no indication for ERCP, EGD was not performed due to the stability of hemoglobin and no signs of active bleeding    Plan  -will complete a 6 day course of Augmentin suspension to be delivered through PEG tube  -Continue tube feeds with PEG tube  -Continue to monitor  -Follow-up with PCP

## 2022-07-22 NOTE — DISCHARGE SUMMARY
[facility]  Discharge- Coretta Forbes 1935, 81 y o  female MRN: [id number]  Unit/Bed#: W -01 Encounter: [id number]  Primary Care Provider: Diana Cao   Date and time admitted to hospital: 7/18/2022  8:04 PM    * Acute cholecystitis due to biliary calculus  Assessment & Plan  Patient is an 81year-old with history of atrial fibrillation on elliquis, dementia, has PEG tube in for severe calorie malnutrition, who was brought to the ED for dark brown emesis with some present in the PEG tube  Physical examination showed she was tachycardic, afebrile, with right upper tenderness  Blood work revealed leucocytosis with neutrophilic predominance, elevated liver enzymes  Ultrasound of the abdomen showed, gall stone with slurge, distended gall bladder with bladder wall thickening, sonographic sawant's sound which are alll consistent with acute cholecystitis  Sepsis, POA (tachycardic at 1:11 a m , leukocytosis of 12 17, possible infectious etiology treated with IV ceftriaxone and Flagyl)  -GI evaluated the patient and stated no indication for ERCP, EGD was not performed due to the stability of hemoglobin and no signs of active bleeding    Plan  -will complete a 6 day course of Augmentin suspension to be delivered through PEG tube  -Continue tube feeds with PEG tube  -Continue to monitor  -Follow-up with PCP    Coffee ground emesis  Assessment & Plan  Patient with Atrial  fibrillation on eliquis, no recent use of NSAID, present with dark brown emesis, part in the PEG tube  No documenation of dizziness or syncope  Patient is conscious although demented, no other visible sources of bleeding, rectal exam denied at ER   Hb was 11 2  rule out an upper GI bleed as complication of PEG tube  Differential; esophagitis or gastritis  FOBT was positive but could be secondary to hemorrhoids  Gastroenterology recommends holding off on EGD unless Hgb drops and signs of obvious bleeding present  Hgb remains stable    Plan  Diet: Tube feeds  Continue to monitor Hgb      Stage IV pressure ulcer (HCC)  Assessment & Plan  Continue care plan    Skin care plans:  1-Hydraguard to bilateral buttock and heels BID and PRN  2-Elevate heels to offload pressure  3-Ehob cushion in chair when out of bed  4-Moisturize skin daily with skin nourishing cream   5-Turn/reposition q2h for pressure re-distribution on skin    6-Mid sacral wound- Cleanse wound with Dakin's solution  Then rinse with NSS, pat dry  Apply nickel thick layer of santyl to wound bed, then pack wound lightly with plain packing ribbon  Cover with 4x4, and then Allevyn foam dressing  Change daily and as needed for soilage/dislodgment  7-Left hip and right heel- Cleanse wounds with soap and water, pat dry  Apply Allevyn foam dressing over wounds  Edwin w/T and date  Change every 3 days and as needed  Peel back and check skin integrity every shift   8-P-500 low air loss mattress      Plan:  Continue to monitor       Dementia without behavioral disturbance (HCC)  Assessment & Plan  No change from patients baseline  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, bedbound status, and resistance to mobility  Agitated behavior is consistent with baseline per patient's son due to bedridden status for past 11 months    Plan  Fall precautions  Care as needed      Medical Problems             Resolved Problems  Date Reviewed: 7/22/2022   None               Discharging Resident: Brad Mcmanus DO  Discharging Attending: No att  providers found  PCP: Olivier Tucker  Admission Date:   Admission Orders (From admission, onward)     Ordered        07/19/22 0033  INPATIENT ADMISSION  Once                      Discharge Date: 07/22/22    Consultations During Hospital Stay:  · None    Procedures Performed:   · None    Significant Findings / Test Results:   US right upper quadrant    Result Date: 7/18/2022  Impression: Again noted is a large gallstone and sludge within a distended gallbladder  There is mild gallbladder wall thickening  Sonographic Benítez sign reported as positive  Findings are suggestive of acute cholecystitis  Mild hepatomegaly  Incidental Findings:   · None    Test Results Pending at Discharge (will require follow up): · None     Outpatient Tests Requested:  · CBC and differential  · BMP    Complications:  None    Reason for Admission:  Hematemesis    Hospital Course:   Quinton Wiseman is a 81 y o  female with a past medical history of dementia, and AFib on Eliquis, and stage IV sacral ulcer who originally presented to the hospital on 7/18/2022 due to dark/coffee colored emesis  The dark colored emesis was also noted to be seen in her PEG tube (placed on 7/2/2022 for protein calorie malnutrition) after aspiration  In the ED, an abdominal ultrasound was performed which showed large distended gallbladder with gallstones, mild gallbladder wall thickening, and a positive sonographic Benítez sign  Lab work showed white blood cell count of 12,170 with neutrophil predominance, hemoglobin 11 3, AST of 633, ALT of 238, alk phos of 288, total protein of 5 3, and albumin of 2 3  Initial lactate was within normal limits  Blood cultures were obtained which did grow Staph epidermidis  Repeat blood cultures grew coagulase-negative Staph and Gram-positive cocci in clusters  The patient was treated with a full 4 day course of ceftriaxone, as well as a 5 day course of metronidazole  The antibiotics were started on July 18th  General surgery was consulted, and they advised no need for surgical intervention and percutaneous cholecystectomy tube  GI was consulted and agreed with continuation of Protonix drip, which was later transitioned to the Protonix 40 mg b i d   Patient's white blood cell count was downtrending every day throughout her hospitalization, and no active signs of bleeding were noticed throughout her hospital course    The patient was deemed suitable for discharge by the surgical team, Gastroenterology team, and primary medicine team   She was discharged on July 22nd 2022 with instructions to take Augmentin suspension through her PEG tube for a full 6 day course  Please see above list of diagnoses and related plan for additional information  Condition at Discharge: good    Discharge Day Visit / Exam:   Subjective:  Hematemesis  Vitals: Blood Pressure: 126/74 (07/22/22 0811)  Pulse: 101 (07/22/22 0811)  Temperature: 97 8 °F (36 6 °C) (07/22/22 0811)  Temp Source: Oral (07/22/22 0811)  Respirations: 16 (07/22/22 0811)  SpO2: 97 % (07/22/22 0811)  Exam:   Physical Exam  Vitals and nursing note reviewed  Constitutional:       General: She is not in acute distress  Appearance: She is well-developed  She is not ill-appearing  HENT:      Head: Normocephalic and atraumatic  Eyes:      Conjunctiva/sclera: Conjunctivae normal    Cardiovascular:      Rate and Rhythm: Normal rate and regular rhythm  Pulses: Normal pulses  Heart sounds: Normal heart sounds  No murmur heard  Pulmonary:      Effort: Pulmonary effort is normal  No respiratory distress  Breath sounds: Normal breath sounds  Abdominal:      General: Abdomen is flat  Bowel sounds are normal  There is no distension  Palpations: Abdomen is soft  Tenderness: There is no abdominal tenderness  Musculoskeletal:      Cervical back: Neck supple  Skin:     General: Skin is warm and dry  Neurological:      General: No focal deficit present  Mental Status: She is alert  Mental status is at baseline  Psychiatric:         Mood and Affect: Mood normal          Behavior: Behavior normal           Discussion with Family: Updated  (son) via phone  Discharge instructions/Information to patient and family:   See after visit summary for information provided to patient and family        Provisions for Follow-Up Care:  See after visit summary for information related to follow-up care and any pertinent home health orders  Disposition:   Acute Rehab at [facility]    Planned Readmission: None    Discharge Medications:  See after visit summary for reconciled discharge medications provided to patient and/or family

## 2022-07-22 NOTE — PLAN OF CARE
Problem: MOBILITY - ADULT  Goal: Maintain or return to baseline ADL function  Description: INTERVENTIONS:  -  Assess patient's ability to carry out ADLs; assess patient's baseline for ADL function and identify physical deficits which impact ability to perform ADLs (bathing, care of mouth/teeth, toileting, grooming, dressing, etc )  - Assess/evaluate cause of self-care deficits   - Assess range of motion  - Assess patient's mobility; develop plan if impaired  - Assess patient's need for assistive devices and provide as appropriate  - Encourage maximum independence but intervene and supervise when necessary  - Involve family in performance of ADLs  - Assess for home care needs following discharge   - Consider OT consult to assist with ADL evaluation and planning for discharge  - Provide patient education as appropriate  Outcome: Progressing  Goal: Maintains/Returns to pre admission functional level  Description: INTERVENTIONS:  - Perform BMAT or MOVE assessment daily    - Set and communicate daily mobility goal to care team and patient/family/caregiver  - Collaborate with rehabilitation services on mobility goals if consulted  - Perform Range of Motion 2 times a day  - Reposition patient every 2 hours    - Record patient progress and toleration of activity level   Outcome: Progressing

## 2022-07-23 NOTE — TELEPHONE ENCOUNTER
Ashlyn Pelayo called from [address], requesting a call back from on call provider, to clarify n abx order   Provider paged via [facility]

## 2022-07-24 LAB — BACTERIA BLD CULT: NORMAL

## 2022-07-25 ENCOUNTER — NURSING HOME VISIT (OUTPATIENT)
Dept: GERIATRICS | Facility: OTHER | Age: 87
End: 2022-07-25
Payer: MEDICARE

## 2022-07-25 VITALS
RESPIRATION RATE: 18 BRPM | DIASTOLIC BLOOD PRESSURE: 82 MMHG | SYSTOLIC BLOOD PRESSURE: 107 MMHG | BODY MASS INDEX: 27.41 KG/M2 | HEART RATE: 80 BPM | OXYGEN SATURATION: 97 % | WEIGHT: 169.8 LBS | TEMPERATURE: 97.3 F

## 2022-07-25 DIAGNOSIS — R53.1 GENERALIZED WEAKNESS: ICD-10-CM

## 2022-07-25 DIAGNOSIS — K80.00 ACUTE CHOLECYSTITIS DUE TO BILIARY CALCULUS: ICD-10-CM

## 2022-07-25 DIAGNOSIS — F03.91 DEMENTIA WITH BEHAVIORAL DISTURBANCE, UNSPECIFIED DEMENTIA TYPE: ICD-10-CM

## 2022-07-25 DIAGNOSIS — I10 ESSENTIAL HYPERTENSION: ICD-10-CM

## 2022-07-25 DIAGNOSIS — I82.409 DEEP VEIN THROMBOSIS (DVT) OF LOWER EXTREMITY, UNSPECIFIED CHRONICITY, UNSPECIFIED LATERALITY, UNSPECIFIED VEIN (HCC): ICD-10-CM

## 2022-07-25 DIAGNOSIS — E43 SEVERE PROTEIN-CALORIE MALNUTRITION (HCC): ICD-10-CM

## 2022-07-25 DIAGNOSIS — K92.0 COFFEE GROUND EMESIS: Primary | ICD-10-CM

## 2022-07-25 DIAGNOSIS — L89.154 PRESSURE INJURY OF SACRAL REGION, STAGE 4 (HCC): ICD-10-CM

## 2022-07-25 PROCEDURE — 99305 1ST NF CARE MODERATE MDM 35: CPT | Performed by: FAMILY MEDICINE

## 2022-07-25 NOTE — ASSESSMENT & PLAN NOTE
Malnutrition Findings:   · Chronic Illness  · Activity intolerance   · Weight loss   · Reviewed Labs while inpatient -Albumin 2 2 and Protein 4 4  BMI Findings: Body mass index is 27 41 kg/m²       · S/p PEG tube placement 7/2/2022  · Jevity   · RD to follow at [facility]

## 2022-07-25 NOTE — ASSESSMENT & PLAN NOTE
Multifactorial, continue supportive treatment   Continue tube feedings  Nutritionist following closely

## 2022-07-25 NOTE — ASSESSMENT & PLAN NOTE
On chronic anticoagulation with Eliquis secondary to atrial fibrillation and history of DVT  No recent use of and say, patient was found having dark brown and sees, part in the make 2  Gastroenterology evaluated patient, she was treated conservatively, with IV pantoprazole and serial H&H  Patient did not have further bleeding, hemoglobin remained stable  Continue PPIs  Follow-up CBC

## 2022-07-25 NOTE — ASSESSMENT & PLAN NOTE
Patient was admitted in the hospital secondary to dark brown emesis  On initial physical examination she was tachycardic, with right upper tenderness  Blood work reveal leukocytosis with neutrophilic predominance and elevated liver enzymes  An ultrasound of the abdomen showed gallstone with sludge, distended gallbladder with bladder wall thickening and sonographic Benítez's sign positive  Patient was treated conservatively due to high surgical risk  She was started on IV ceftriaxone and Flagyl with transition to Augmentin      Complete 6 days course of Augmentin through PEG tube  Continue tube feeds   Continue to monitor   Follow-up CMP

## 2022-07-25 NOTE — ASSESSMENT & PLAN NOTE
· AA out X 1   · Confused   Provide redirection, reorientation, distraction techniques  Fall Precautions  Assist with ADLs/IADLs  Avoid deliriogenic medications such as tramadol, benzodiazepines, anticholinergics, benadryl  Encourage Hydration/ Nutrition  Implement sleep hygiene, limit night time interuptions   Encourage participation in group activities when appropriate   Will start sertraline 25 mg daily for 3 weeks and then increase to 50 mg if needed

## 2022-07-25 NOTE — PROGRESS NOTES
[address]  [address], [address] [place] [address]   [address] [facility]  History and Physical    NAME: Helene Birmingham  AGE: 81 y o  SEX: female [id number]    DATE OF ENCOUNTER: 7/25/2022    Code status:  CPR    Assessment and Plan     1  Coffee ground emesis  Assessment & Plan: On chronic anticoagulation with Eliquis secondary to atrial fibrillation and history of DVT  No recent use of and say, patient was found having dark brown and sees, part in the make 2  Gastroenterology evaluated patient, she was treated conservatively, with IV pantoprazole and serial H&H  Patient did not have further bleeding, hemoglobin remained stable  Continue PPIs  Follow-up CBC  2  Acute cholecystitis due to biliary calculus  Assessment & Plan:  Patient was admitted in the hospital secondary to dark brown emesis  On initial physical examination she was tachycardic, with right upper tenderness  Blood work reveal leukocytosis with neutrophilic predominance and elevated liver enzymes  An ultrasound of the abdomen showed gallstone with sludge, distended gallbladder with bladder wall thickening and sonographic Benítez's sign positive  Patient was treated conservatively due to high surgical risk  She was started on IV ceftriaxone and Flagyl with transition to Augmentin  Complete 6 days course of Augmentin through PEG tube  Continue tube feeds   Continue to monitor   Follow-up CMP          3  Generalized weakness  Assessment & Plan:  Multifactorial, continue supportive treatment   Continue tube feedings  Nutritionist following closely  4  Severe protein-calorie malnutrition (HCC)  Assessment & Plan:  Malnutrition Findings:   · Chronic Illness  · Activity intolerance   · Weight loss   · Reviewed Labs while inpatient -Albumin 2 2 and Protein 4 4  BMI Findings: Body mass index is 27 41 kg/m²  · S/p PEG tube placement 7/2/2022  · Jevity   · RD to follow at [facility]        5  Pressure injury of sacral region, stage 4 [facility])  Assessment & Plan:    Continue supportive treatment ,local wound care, offload  Provide incontinence care  [facility] wound care CRNP to follow during weekly rounds-appreciate recommendations      6  Deep vein thrombosis (DVT) of lower extremity, unspecified chronicity, unspecified laterality, unspecified vein (HCC)  Assessment & Plan:  · Decrease Eliquis to 2 5 BID due h/o gastrointestinal bleeding in an elderly frail patient         7  Dementia with behavioral disturbance, unspecified dementia type ([facility] )  Assessment & Plan:  · AA out X 1   · Confused   Provide redirection, reorientation, distraction techniques  Fall Precautions  Assist with ADLs/IADLs  Avoid deliriogenic medications such as tramadol, benzodiazepines, anticholinergics, benadryl  Encourage Hydration/ Nutrition  Implement sleep hygiene, limit night time interuptions   Encourage participation in group activities when appropriate   Will start sertraline 25 mg daily for 3 weeks and then increase to 50 mg if needed         8  Essential hypertension  Assessment & Plan:  H/o HTN, BP stable  Continue to monitor        All medications and routine orders were reviewed and updated as needed  Plan discussed with: Nurse    Chief Complaint     Seen for admission at [address] [place] [address]    History of Present Illness   18-year-old female with a past medical history significant for dementia, AFib, DVT on Eliquis, a stage IV sacral wound, who presented on July 18 2 [address] [place] [address] secondary to coffee-ground appearing emesis  Patient has severe protein calorie malnutrition with chronic illness, activity intolerance and weight loss, she had PEG tube placement on July 2nd  Patient was admitted for possible acute calculous cholecystitis, she was meeting SIRS criteria  She did have transaminitis, mild leukocytosis    Patient was evaluated by surgical team and she was deemed high risk for any surgical intervention, she was started on antibiotics  GI was consulted, they recommended to monitor hemoglobin and transfuse as needed if hemoglobin drop less than 7  She was started on pantoprazole drip  Her leukocytosis and transaminitis improved, she was afebrile, antibiotics transitioned to Augmentin through PEG tube  Patient was seen today, she refused morning labs and has been poorly cooperative with care, sometimes even aggressive towards staff  No other concerns expressed by nurse  HISTORY:  Past Medical History:   Diagnosis Date    A-fib ([facility] )     Dementia ([facility] )     [facility] thrombosis) [facility])     Hypertension     Pressure ulcer of sacral region      History reviewed  No pertinent family history  Social History     Socioeconomic History    Marital status:      Spouse name: None    Number of children: None    Years of education: None    Highest education level: None   Occupational History    None   Tobacco Use    Smoking status: Never Smoker    Smokeless tobacco: Never Used   Vaping Use    Vaping Use: Never used   Substance and Sexual Activity    Alcohol use: Not Currently    Drug use: Never    Sexual activity: Not Currently   Other Topics Concern    None   Social History Narrative    None     Social Determinants of Health     Financial Resource Strain: Not on file   Food Insecurity: No Food Insecurity    Worried About Running Out of Food in the Last Year: Never true    Stoney of Food in the Last Year: Never true   Transportation Needs: No Transportation Needs    Lack of Transportation (Medical): No    Lack of Transportation (Non-Medical):  No   Physical Activity: Not on file   Stress: Not on file   Social Connections: Not on file   Intimate Partner Violence: Not on file   Housing Stability: Low Risk     Unable to Pay for Housing in the Last Year: No    Number of Places Lived in the Last Year: 2    Unstable Housing in the Last Year: No       Allergies:  No Known Allergies    Review of Systems     Review of Systems   Unable to perform ROS: Dementia       Medications and orders     All medications reviewed and updated in Nursing Home EMR  Objective     Vitals:   /82  Temp 97 3  Pulse 80  Weight 169 8  RR 18  O2 sat 97%    Physical Exam  Vitals reviewed  Constitutional:       General: She is not in acute distress  HENT:      Head: Normocephalic  Mouth/Throat:      Mouth: Mucous membranes are moist    Eyes:      General: No scleral icterus  Cardiovascular:      Rate and Rhythm: Normal rate  Heart sounds: Normal heart sounds  Pulmonary:      Effort: No respiratory distress  Abdominal:      Comments: PEG tube in place    Musculoskeletal:      Right lower leg: Edema present  Left lower leg: Edema present  Skin:     General: Skin is warm  Coloration: Skin is not jaundiced  Neurological:      Mental Status: She is alert  She is disoriented  Psychiatric:         Mood and Affect: Affect is angry  Behavior: Behavior is uncooperative  Cognition and Memory: Cognition is impaired  Memory is impaired  Pertinent Laboratory/Diagnostic Studies: The following labs/studies were reviewed please see chart or hospital paperwork for details    CBC CMP     - Counseling Documentation: patient was counseled regarding: diagnostic results, instructions for management, prognosis and patient and family education

## 2022-07-25 NOTE — ASSESSMENT & PLAN NOTE
Continue supportive treatment ,local wound care, offload  Provide incontinence care  Charly Solano's wound care CRNP to follow during weekly rounds-appreciate recommendations

## 2022-07-26 NOTE — ED ATTENDING ATTESTATION
7/18/2022  I, Catracho Cantor MD, saw and evaluated the patient  I have discussed the patient with the resident/non-physician practitioner and agree with the resident's/non-physician practitioner's findings, Plan of Care, and MDM as documented in the resident's/non-physician practitioner's note, except where noted  All available labs and Radiology studies were reviewed  I was present for key portions of any procedure(s) performed by the resident/non-physician practitioner and I was immediately available to provide assistance  At this point I agree with the current assessment done in the Emergency Department    I have conducted an independent evaluation of this patient a history and physical is as follows:see h and p abo ve- agree with resident tx plan/ dispo    ED Course  ED Course as of 07/26/22 1251   Mon Jul 18, 2022   2106 Er md note - peg tube hooked up to suction with coffee ground  material return- grossly heme pos--    2253 Er md note- pt re-evaluated- resting in nad- aware of lab and rue u/s resutls and gen surg to see    2335 ER MD NOTE- PT GERIATRIC NH NOTE  PT WAS TACHY AT 81    Tue Jul 19, 2022   0026 - er md note- pt seen by gen surg  resident - at this point they will rec iv antibx and follow pt          Critical Care Time  Procedures

## 2022-07-26 NOTE — QUICK NOTE
Progress Note - Wound   Joao Greene 81 y o  female MRN: [id number]  Unit/Bed#: W -01 Encounter: [id number]      Assessment:   Patient has received low air loss air mattress  Plan:   Continue wound care recommendations

## 2022-07-27 ENCOUNTER — NURSING HOME VISIT (OUTPATIENT)
Dept: GERIATRICS | Facility: OTHER | Age: 87
End: 2022-07-27
Payer: MEDICARE

## 2022-07-27 ENCOUNTER — NURSING HOME VISIT (OUTPATIENT)
Dept: WOUND CARE | Facility: HOSPITAL | Age: 87
End: 2022-07-27
Payer: MEDICARE

## 2022-07-27 VITALS
HEART RATE: 80 BPM | BODY MASS INDEX: 27.5 KG/M2 | SYSTOLIC BLOOD PRESSURE: 135 MMHG | RESPIRATION RATE: 18 BRPM | TEMPERATURE: 98.2 F | WEIGHT: 170.4 LBS | OXYGEN SATURATION: 95 % | DIASTOLIC BLOOD PRESSURE: 78 MMHG

## 2022-07-27 DIAGNOSIS — K80.00 ACUTE CHOLECYSTITIS DUE TO BILIARY CALCULUS: Primary | ICD-10-CM

## 2022-07-27 DIAGNOSIS — T14.8XXA DEEP TISSUE INJURY: Primary | ICD-10-CM

## 2022-07-27 DIAGNOSIS — T14.8XXA DEEP TISSUE INJURY: ICD-10-CM

## 2022-07-27 DIAGNOSIS — F03.91 DEMENTIA WITH BEHAVIORAL DISTURBANCE, UNSPECIFIED DEMENTIA TYPE: ICD-10-CM

## 2022-07-27 DIAGNOSIS — K92.0 COFFEE GROUND EMESIS: ICD-10-CM

## 2022-07-27 DIAGNOSIS — E43 SEVERE PROTEIN-CALORIE MALNUTRITION (HCC): ICD-10-CM

## 2022-07-27 DIAGNOSIS — L89.154 PRESSURE INJURY OF SACRAL REGION, STAGE 4 (HCC): ICD-10-CM

## 2022-07-27 DIAGNOSIS — T14.8XXA TRAUMATIC INJURY TO SKIN OR SUBCUTANEOUS TISSUE: ICD-10-CM

## 2022-07-27 DIAGNOSIS — L89.150 PRESSURE INJURY OF SACRAL REGION, UNSTAGEABLE (HCC): ICD-10-CM

## 2022-07-27 PROCEDURE — 99309 SBSQ NF CARE MODERATE MDM 30: CPT | Performed by: NURSE PRACTITIONER

## 2022-07-27 PROCEDURE — 99308 SBSQ NF CARE LOW MDM 20: CPT | Performed by: NURSE PRACTITIONER

## 2022-07-27 NOTE — ASSESSMENT & PLAN NOTE
Right heel  -POA to SNF  - decrease in size,  purple, non blanchble  - + DP and PT on the right foot  - local wound care with skin prep   - offload    Left buttock  - onset  : unknown   - purple, non blanchable  - local wound care - zguard  -

## 2022-07-27 NOTE — ASSESSMENT & PLAN NOTE
Right forearm and right wrist  - POA to SNF  - all stable  - no obvious sign of infection  - local wound care with border foam

## 2022-07-27 NOTE — ASSESSMENT & PLAN NOTE
- Location : Sacrum  - Wound healing status: declining  - Enzymatic debridement using santyl  - Pressure redistribution device - air mattress  - possible osteomyelitis, ordered xray  - Continue to offload  - Increase protein   - No sign localized infection during visit  - Clinical factors affecting wound healing includes age, chronicity, cognitive issue co-morbidities, incontinence, location of the wound, mobility impairement, sensory deficits, behavioral issues  -Follow up : Next week

## 2022-07-27 NOTE — ASSESSMENT & PLAN NOTE
· Reported to have developed coffee-ground emesis at SNF sent to ED   · Patient was tachycardic upon presentation with leukocytosis and transaminitis on labs  · Ultrasound of abdomen revealed gallstone with sludge, distended gallbladder with bladder wall thickening and sonographic positive Benítez sign   · General surgery deemed patient to be a high risk for surgery   · She was treated with IV ceftriaxone and IV Flagyl and later transition to p o   Augmentin  · Patient improved with antibiotics   · Patient tolerating tube feeds   · She was sent back to SNF to continue short-term rehab    Patient continues to tolerate her tube feeds she still continues to have poor p o  intake at times does not eat anything orally and at least will eat 25% of 1 meal

## 2022-07-27 NOTE — PROGRESS NOTES
[address] MANAGEMENT   AND [facility]PER[facility]       Patient ID: Telma Kingston is a 81 y o  female Date of Birth 1935     Location of Service: [address] [place] [address]    Performed wound round with: Wound team       Chief complaint: multiple wounds    Wound Instructions:  Wound:  Right forearm   Discontinue previous wound order  Cleanse the wound bed with NSS   Apply non-sting skin prep to periwound area  Apply bordered foam to wound bed  Frequency : three times a week and prn for soiling    Wound : Right heel  Cleanse with NSS  Apply skin prep to periwound and wound bed  Daily and prn for soiling    Wound sacrum  Cleanse the wound with Dakins quarter strenght  Apply zguard to periwound including the buttocks and left buttock wound  Apply santyl collagenase to wound bed and cover with optifoam  Daily and prn for soiling    Order xray to rule out osteomyelitis    Prevalon boots when in bed  Offload all wounds  Turn and reposition frequently, maximum of every two hours  Instruct / Assist with weight shifting every 15 - 20 minutes when in chair  Increase protein intake  Monitor for any sign of infection or worsening, inform PCP or patient's primary physician in your facility  Allergies  Patient has no known allergies  Assessment & Plan:  1  Deep tissue injury  Assessment & Plan:  Right heel  -POA to SNF  - decrease in size,  purple, non blanchble  - + DP and PT on the right foot  - local wound care with skin prep   - offload    Left buttock  - onset  : unknown   - purple, non blanchable  - local wound care - zguard  -      2  Traumatic injury to skin or subcutaneous tissue  Assessment & Plan:  Right forearm and right wrist  - POA to SNF  - all stable  - no obvious sign of infection  - local wound care with border foam        3   Pressure injury of sacral region, unstageable [facility])  Assessment & Plan:  - Location : Sacrum  - Wound healing status: declining  - Enzymatic debridement using santyl  - Pressure redistribution device - air mattress  - possible osteomyelitis, ordered xray  - Continue to offload  - Increase protein   - No sign localized infection during visit  - Clinical factors affecting wound healing includes age, chronicity, cognitive issue co-morbidities, incontinence, location of the wound, mobility impairement, sensory deficits, behavioral issues  -Follow up : Next week                  Subjective: Follow up for multiple wounds   Patient have a complex medical history including but not limited to dementia, DVT on Eliquis, severe protein malnutrition  Patient was referred by Senior Care Team  Patient was seen in collaboration with the facility wound team      Received patient in bed, yelling  Patient is non cooperative during visit  Patient is on air mattress  Patient refused assessment of her sacrum and buttocks last week  Review of Systems   Unable to perform ROS: Dementia       Objective: There were no vitals taken for this visit  Physical Exam  Constitutional:       Appearance: She is ill-appearing  HENT:      Head: Normocephalic and atraumatic  Nose: Nose normal       Mouth/Throat:      Pharynx: Oropharynx is clear  Eyes:      Conjunctiva/sclera: Conjunctivae normal    Pulmonary:      Effort: Pulmonary effort is normal    Abdominal:      Tenderness: There is no abdominal tenderness  There is no guarding  Comments: With peg tube   Genitourinary:     Comments: incontinent  Musculoskeletal:         General: No tenderness  Cervical back: Normal range of motion  Right lower leg: No edema  Left lower leg: No edema  Comments: LROM   Skin:     General: Skin is warm  Findings: Lesion present        Comments: Right forearm medial - wound size is 3 x 1 5 x 0 1 cm , 100% epithelial, no drainage, no obvious sign of infection    Right forearm proximal - wound size is 1 x 2 CMP, 100% eschar, no drainage, no obvious signs of infection      Sacrum - wound size is 3 x 3 x 1 cm , with undermining all around the edges, deepest at 12 - 4 cm,  moderate serous drainage, no malodor, no obvious sign of infection, positive tenderness on palpation    Right heel - 0 2 x 0 2   Cm, purple, non blanchable, with no obvious sign of infection    Right wrist - wound size is 1 5 x 0 7, 100% eschar, no drainage, no obvious sign of infection    Left groin - red, diffused, with no obvious sign of infection    Left buttock - wound size is 1 5 by 1 cm , purple, non blanchable, with no obvious sign of infection             Neurological:      Mental Status: She is alert  Gait: Gait abnormal    Psychiatric:         Mood and Affect: Mood normal          Behavior: Behavior normal               Procedures           Patient's care was coordinated with nursing facility staff  Recent vitals, labs and updated medications were reviewed on EMR or chart system of facility  Past Medical, surgical, social, medication and allergy history and patient's previous records were reviewed and updated as appropriate:     Patient Active Problem List   Diagnosis    [facility]    Essential hypertension    Dementia with behavioral disturbance ([facility] )    Generalized weakness    Severe protein-calorie malnutrition [facility] )    Electrolyte disturbance    Stage IV pressure ulcer ([facility]    SIRS (systemic inflammatory response syndrome) ([facility] )    Deep tissue injury    Traumatic injury to skin or subcutaneous tissue    Confused    Acute cholecystitis due to biliary calculus    Coffee ground emesis    Pressure injury of sacral region, unstageable ([facility] )     Past Medical History:   Diagnosis Date    A-fib ([facility] )     Dementia ([facility]     DVT (deep venous thrombosis) ([facility] )     Hypertension     Pressure ulcer of sacral region      History reviewed  No pertinent surgical history  Social History     Socioeconomic History    Marital status:       Spouse name: None    Number of children: None    Years of education: None    Highest education level: None   Occupational History    None   Tobacco Use    Smoking status: Never Smoker    Smokeless tobacco: Never Used   Vaping Use    Vaping Use: Never used   Substance and Sexual Activity    Alcohol use: Not Currently    Drug use: Never    Sexual activity: Not Currently   Other Topics Concern    None   Social History Narrative    None     Social Determinants of Health     Financial Resource Strain: Not on file   Food Insecurity: No Food Insecurity    Worried About Running Out of Food in the Last Year: Never true    Stoney of Food in the Last Year: Never true   Transportation Needs: No Transportation Needs    Lack of Transportation (Medical): No    Lack of Transportation (Non-Medical): No   Physical Activity: Not on file   Stress: Not on file   Social Connections: Not on file   Intimate Partner Violence: Not on file   Housing Stability: Low Risk     Unable to Pay for Housing in the Last Year: No    Number of Places Lived in the Last Year: 2    Unstable Housing in the Last Year: No        Current Outpatient Medications:     acetaminophen (TYLENOL) 325 mg tablet, Take 650 mg by mouth every 6 (six) hours as needed, Disp: , Rfl:     amoxicillin-clavulanate (AUGMENTIN) 200-28 5 mg/5 mL suspension, 21 mL (840 mg total) by Per G Tube route 2 (two) times a day for 6 days, Disp: 252 mL, Rfl: 0    collagenase (SANTYL) ointment, Apply 1 application topically daily, Disp: , Rfl:     Eliquis 5 MG, Hold medication today, 6/30/2022 and tomorrow, 7/1/2022, Disp: 30 tablet, Rfl: 3    multivitamin (THERAGRAN) TABS, Take 1 tablet by mouth daily, Disp: , Rfl:     Skin Protectants, Misc  (PeriGuard) OINT, Apply topically, Disp: , Rfl:   History reviewed  No pertinent family history  Coordination of Care: Wound team aware of the treatment plan  Facility nurse will provide wound treatment and monitor the wound for any changes       Patient / Staff education : Staff was given education on sign of infection and pressure ulcer prevention  Staff verbalized understanding     Follow up :  Next week    Voice-recognition software may have been used in the preparation of this document  Occasional wrong word or "sound-alike" substitutions may have occurred due to the inherent limitations of voice recognition software  Interpretation should be guided by context        JUDIE Varghese

## 2022-07-27 NOTE — PROGRESS NOTES
[address]  [phone]  [place]  Code 31 (STR)      NAME: Zan Fitch  AGE: 81 y o  SEX: female CODE STATUS: CPR    DATE OF ENCOUNTER: 7/27/2022    Assessment and Plan     1  Acute cholecystitis due to biliary calculus  Assessment & Plan:  · Reported to have developed coffee-ground emesis at SNF sent to ED   · Patient was tachycardic upon presentation with leukocytosis and transaminitis on labs  · Ultrasound of abdomen revealed gallstone with sludge, distended gallbladder with bladder wall thickening and sonographic positive Benítez sign   · General surgery deemed patient to be a high risk for surgery   · She was treated with IV ceftriaxone and IV Flagyl and later transition to p o  Augmentin  · Patient improved with antibiotics   · Patient tolerating tube feeds   · She was sent back to SNF to continue short-term rehab    Patient continues to tolerate her tube feeds she still continues to have poor p o  intake at times does not eat anything orally and at least will eat 25% of 1 meal        2  Coffee ground emesis  Assessment & Plan:  · History of chronic anticoagulation with Eliquis due to AFib in history of DVT  · Patient developed some coffee-ground emesis at SNF  · GI evaluated while inpatient she was treated conservatively with IV Protonix   · Serial H&Hs performed and she did not have any further bleeding or emesis noted and her hemoglobin remained stable   · Recommended to continue PPI and follow CBC at SNF   · H&H 9 1/26 9 on 7/25/2022 at [facility]  · Patient often refusing blood work will continue to monitor for any S/S of bleeding      3   Dementia with behavioral disturbance, unspecified dementia type ([facility] )  Assessment & Plan:  · AA out X 1   · Confused   · Due to aggressive behaviors with staff and noncompliance with care patient was started on sertraline 25 mg daily x3 weeks on 7/27/2022 and then increase to 50 mg daily if needed      Provide redirection, reorientation, distraction techniques  Fall Precautions  Assist with ADLs/IADLs  Avoid deliriogenic medications such as tramadol, benzodiazepines, anticholinergics, benadryl  Encourage Hydration/ Nutrition  Implement sleep hygiene, limit night time interuptions   Encourage participation in group activities when appropriate         4  Deep tissue injury  Assessment & Plan:  · DT I to right heel which was present on admission to SNF   · Per review of wound care CRNP notes wound is purple non blanchable good pulses   · Continue local wound care with skin prep   · Elevate heel off of bed      5  Pressure injury of sacral region, stage 4 [facility])  Assessment & Plan:  · Present on admission to inpatient hospital stay   · Wound was not visualized on exam today  · Per review of nursing report there is undermining  · Patient was previously treated with Dakin's rinse/santal   · Offload  · Provide incontinence care  · [facility] wound care CRNP to follow during weekly rounds-appreciate recommendations      6  Severe protein-calorie malnutrition ([facility] )  Assessment & Plan:  Malnutrition Findings:   · Chronic illness  · Activity intolerance  · Frail cachectic appearance   · Albumin 2 2 and protein 4 4    BMI Findings:  · Poor p o  intake   · S/p PEG tube placement 7/2/2022  · Continue Jevity   · Registered dietitian to follow at SNF    Body mass index is 27 5 kg/m²  All medications and routine orders were reviewed and updated as needed  Chief Complaint     STR follow up visit    Past Medical and Surgica History      Past Medical History:   Diagnosis Date    A-fib ([facility] )     Dementia ([facility] )     DVT (deep venous thrombosis) ([facility] )     Hypertension     Pressure ulcer of sacral region      History reviewed  No pertinent surgical history    No Known Allergies       History of Present Illness     Blily Caldera is a 81year old female, initially admitted to [facility] on 7/11/2022 for STR, sent to ED on 7/18 for GIB and returned on 7/22/2022  Past Medical Hx including but not limited to HTN, DVT, AFIB, Dementia, Malnutrition, Pressure Ulcer, Generalized weakness and Ambulatory Dysfunction  seen in collaboration with nursing for medical mgmt and STR follow up  Hospital Course:   [facility] through 7/11/2022 with diagnosis of severe protein calorie malnutrition  Patient presented to ED with generalized weakness with intractable nausea and vomiting  She was recommended to have a PEG tube placed, this was performed by IR on 7/2/2022  and was started on tube feeds with Jevity  Patient did meet SIRS criteria with tachycardia and leukocytosis  No clear source of infection was identified as chest x-ray and CT abdomen negative  Patient did present to hospital with 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 sacrum  Imaging did not reveal any osteomyelitis  Patient was prescribed cefpodoxime 200 mg b i d  x4 days upon discharge  PT OT recommended discharge to short-term rehab  Rehab:     Unfortunately, Patient developed coffee-ground emesis at rehab and was sent to ED  she was admitted for possible acute calculous cholecystitis and she met SIRS criteria  Patient's labs revealed transaminitis and mild leukocytosis  Patient was evaluated by General surgery and she was deemed a high risk for any surgical intervention therefore she was treated conservatively with antibiotics  GI was also consulted and they recommended monitoring the hemoglobin and transfuse as needed for hemoglobin less than 7 0  Patient was started on a Protonix drip with transition to oral PPI upon discharge back to SNF  Her leukocytosis and transaminitis improved she remained afebrile and then her antibiotics were transitioned to oral Augmentin  Patient tolerated her PEG tube feedings and was sent back to SNF to continue STR  Seen and examined at bedside today  Patient is a poor historian due to dementia  Patient is alert but confused  She is AAO X 1 on exam   Per review of records patient has been mostly bed-bound  Only able to get to edge of bed and does not tolerate did sitting for long periods  Patient has bilateral nonpitting moderate lower extremity edema  She has some right upper extremity edema and some bruising to her bilateral arms  Nursing state patient has a stage IV sacral ulcer  This was not visualized on my exam today  Per nursing patient does not cooperate with care and will refuse morning labs  Reportedly patient becomes aggressive toward staff at times  Per review of SNF records, Patient is eating 1 meals per day with no intake on some days, when she does eat she is consuming only 25%  She is incontinent of bowel and bladder, her Last documented BM was 7/25/2022  Patient is actively participating in therapy, she has noted poor activity tolerance, she requires a mechanical lift with assist x2 out of bed  No concerns from nursing at this time  The patient's allergies, past medical, surgical, social and family history were reviewed and unchanged  Review of Systems     Review of Systems   Unable to perform ROS: Dementia         Objective     Vitals:   Vitals:    07/27/22 1359   BP: 135/78   Pulse: 80   Resp: 18   Temp: 98 2 °F (36 8 °C)   SpO2: 95%         Physical Exam  Vitals and nursing note reviewed  Constitutional:       General: She is not in acute distress  Appearance: Normal appearance  She is ill-appearing  HENT:      Head: Normocephalic and atraumatic  Nose: No congestion or rhinorrhea  Mouth/Throat:      Mouth: Mucous membranes are moist    Eyes:      General: No scleral icterus  Conjunctiva/sclera: Conjunctivae normal       Pupils: Pupils are equal, round, and reactive to light  Cardiovascular:      Rate and Rhythm: Normal rate and regular rhythm  Pulses: Normal pulses  Heart sounds: Normal heart sounds   No murmur heard   Pulmonary:      Effort: Pulmonary effort is normal  No respiratory distress  Breath sounds: Normal breath sounds  No wheezing, rhonchi or rales  Abdominal:      General: Bowel sounds are normal  There is no distension  Palpations: Abdomen is soft  There is no mass  Tenderness: There is no abdominal tenderness  Hernia: No hernia is present  Comments: PEG tube in place and running    Musculoskeletal:         General: Swelling (RUE ) present  No tenderness  Right lower leg: Edema present  Left lower leg: Edema present  Lymphadenopathy:      Cervical: No cervical adenopathy  Skin:     General: Skin is warm and dry  Coloration: Skin is not pale  Findings: No rash  Comments: Sacral Pressure ulcer- POA to rehab- not visualized on exam- reviewed nursing notes   Neurological:      General: No focal deficit present  Mental Status: She is alert  Mental status is at baseline  She is disoriented  Motor: Weakness present  Gait: Gait abnormal       Comments: Alert to self  Yelling "leave me alone"   Non cooperative    Psychiatric:         Mood and Affect: Mood normal  Affect is angry  Behavior: Behavior is uncooperative and agitated  Cognition and Memory: Cognition is impaired  Memory is impaired  Pertinent Laboratory/Diagnostic Studies:   Reviewed in facility chart-stable      Current Medications   Medications reviewed and updated see facility [facility] for details        Current Outpatient Medications:     acetaminophen (TYLENOL) 325 mg tablet, Take 650 mg by mouth every 6 (six) hours as needed, Disp: , Rfl:     amoxicillin-clavulanate (AUGMENTIN) 200-28 5 mg/5 mL suspension, 21 mL (840 mg total) by Per G Tube route 2 (two) times a day for 6 days, Disp: 252 mL, Rfl: 0    collagenase (SANTYL) ointment, Apply 1 application topically daily, Disp: , Rfl:     Eliquis 5 MG, Hold medication today, 6/30/2022 and tomorrow, 7/1/2022, Disp: 30 tablet, Rfl: 3    multivitamin (THERAGRAN) TABS, Take 1 tablet by mouth daily, Disp: , Rfl:     Skin Protectants, Misc  (PeriGuard) OINT, Apply topically, Disp: , Rfl:      Plan discussed with Dr Ladan Regan noted agreement with assessment and plan  Please note:  Voice-recognition software may have been used in the preparation of this document  Occasional wrong word or "sound-alike" substitutions may have occurred due to the inherent limitations of voice recognition software  Interpretation should be guided by JUDIE Gao  7/27/2022  2:00 PM

## 2022-07-27 NOTE — ASSESSMENT & PLAN NOTE
Malnutrition Findings:   · Chronic illness  · Activity intolerance  · Frail cachectic appearance   · Albumin 2 2 and protein 4 4    BMI Findings:  · Poor p o  intake   · S/p PEG tube placement 7/2/2022  · Continue Jevity   · Registered dietitian to follow at [facility]    Body mass index is 27 5 kg/m²

## 2022-07-27 NOTE — ASSESSMENT & PLAN NOTE
Please take 2:30 appt (1/2 of benign heme consult) on 1/7 or 1/8.   thanks · History of chronic anticoagulation with Eliquis due to AFib in history of DVT  · Patient developed some coffee-ground emesis at SNF  · GI evaluated while inpatient she was treated conservatively with IV Protonix   · Serial H&Hs performed and she did not have any further bleeding or emesis noted and her hemoglobin remained stable   · Recommended to continue PPI and follow CBC at SNF   · H&H 9 1/26 9 on 7/25/2022 at [facility]  · Patient often refusing blood work will continue to monitor for any S/S of bleeding

## 2022-07-27 NOTE — ASSESSMENT & PLAN NOTE
· AA out X 1   · Confused   · Due to aggressive behaviors with staff and noncompliance with care patient was started on sertraline 25 mg daily x3 weeks on 7/27/2022 and then increase to 50 mg daily if needed      Provide redirection, reorientation, distraction techniques  Fall Precautions  Assist with ADLs/IADLs  Avoid deliriogenic medications such as tramadol, benzodiazepines, anticholinergics, benadryl  Encourage Hydration/ Nutrition  Implement sleep hygiene, limit night time interuptions   Encourage participation in group activities when appropriate

## 2022-07-27 NOTE — ASSESSMENT & PLAN NOTE
· Present on admission to inpatient hospital stay   · Wound was not visualized on exam today  · Per review of nursing report there is undermining  · Patient was previously treated with Dakin's rinse/santal   · Offload  · Provide incontinence care  · [id number] [place]'s wound care CRNP to follow during weekly rounds-appreciate recommendations

## 2022-08-01 ENCOUNTER — NURSING HOME VISIT (OUTPATIENT)
Dept: GERIATRICS | Facility: OTHER | Age: 87
End: 2022-08-01
Payer: MEDICARE

## 2022-08-01 VITALS
BODY MASS INDEX: 27.5 KG/M2 | TEMPERATURE: 96.4 F | RESPIRATION RATE: 20 BRPM | OXYGEN SATURATION: 94 % | DIASTOLIC BLOOD PRESSURE: 64 MMHG | SYSTOLIC BLOOD PRESSURE: 116 MMHG | WEIGHT: 170.4 LBS | HEART RATE: 99 BPM

## 2022-08-01 DIAGNOSIS — K80.00 ACUTE CHOLECYSTITIS DUE TO BILIARY CALCULUS: ICD-10-CM

## 2022-08-01 DIAGNOSIS — R53.1 GENERALIZED WEAKNESS: Primary | ICD-10-CM

## 2022-08-01 DIAGNOSIS — E43 SEVERE PROTEIN-CALORIE MALNUTRITION (HCC): ICD-10-CM

## 2022-08-01 DIAGNOSIS — L89.150 PRESSURE INJURY OF SACRAL REGION, UNSTAGEABLE (HCC): ICD-10-CM

## 2022-08-01 DIAGNOSIS — F03.91 DEMENTIA WITH BEHAVIORAL DISTURBANCE, UNSPECIFIED DEMENTIA TYPE: ICD-10-CM

## 2022-08-01 PROCEDURE — 99309 SBSQ NF CARE MODERATE MDM 30: CPT | Performed by: NURSE PRACTITIONER

## 2022-08-01 NOTE — PROGRESS NOTES
[facility]  [address]ine Tre[address]  [phone]  [place]  Code 31 (STR)      NAME: Mely Rascon  AGE: 81 y o  SEX: female CODE STATUS: CPR    DATE OF ENCOUNTER: 8/1/2022    Assessment and Plan     1  Generalized weakness  Assessment & Plan:  · Generalized weakness in the setting of severe protein malnutrition   · Patient and son opted for PEG tube placement which was done on 7/2/2022 patient was noted to have some issues with delayed gastric emptying while hospitalized with episodes of vomiting as well as acute cholecystitis   · Son wants to continue full code  · Encourage p o  intake with pureed/thin  · Reglan p r n  · Patient has completed her therapy unfortunately due to patient's dementia with aggressive behavior she is often uncooperative with therapy or care  · Son states patient at baseline was able to sit on edge of bed  · Per  patient will transition to long-term care at [address] this week      2  Dementia with behavioral disturbance, unspecified dementia type ([facility] )  Assessment & Plan:  · AA out X 1   · Confused   · Due to aggressive behaviors with staff and noncompliance with care patient was started on sertraline 25 mg daily x3 weeks on 7/27/2022 and then increase to 50 mg daily if needed      Provide redirection, reorientation, distraction techniques  Fall Precautions  Assist with ADLs/IADLs  Avoid deliriogenic medications such as tramadol, benzodiazepines, anticholinergics, benadryl  Encourage Hydration/ Nutrition  Implement sleep hygiene, limit night time interuptions   Encourage participation in group activities when appropriate         3   Severe protein-calorie malnutrition ([facility] )  Assessment & Plan:  Malnutrition Findings:   · Chronic illness  · Activity intolerance  · Frail cachectic appearance   · Albumin 2 2 and protein 4 4    BMI Findings:  · Poor p o  intake   · S/p PEG tube placement 7/2/2022  · Continue Jevity   · Registered dietitian to follow at SNF    Body mass index is 27 5 kg/m²  4  Pressure injury of sacral region, unstageable [facility])  Assessment & Plan:  · Present on admission to inpatient hospital stay   · Wound was not visualized on exam today  · Per review of nursing report there is undermining  · Patient was previously treated with Dakin's rinse/santal   · Offload  · Provide incontinence care  · [id number] [place] wound care CRNP to follow during weekly rounds-appreciate recommendations      5  Acute cholecystitis due to biliary calculus  Assessment & Plan:  · Reported to have developed coffee-ground emesis at SNF sent to ED   · Patient was tachycardic upon presentation with leukocytosis and transaminitis on labs  · Ultrasound of abdomen revealed gallstone with sludge, distended gallbladder with bladder wall thickening and sonographic positive Benítez sign   · General surgery deemed patient to be a high risk for surgery   · She was treated with IV ceftriaxone and IV Flagyl and later transition to p o  Augmentin  · Patient improved with antibiotics   · Patient tolerating tube feeds   · She was sent back to SNF to continue short-term rehab    Patient continues to tolerate her tube feeds she still continues to have poor p o  intake at times does not eat anything orally and at least will eat 25% of 1 meal           All medications and routine orders were reviewed and updated as needed  Chief Complaint     STR follow up visit    Past Medical and Surgica History      Past Medical History:   Diagnosis Date    [facility] )     Dementia ([facility] )     DVT (deep venous thrombosis) ([facility] )     Hypertension     Pressure ulcer of sacral region      No past surgical history on file  No Known Allergies       History of Present Illness     Susan Mims is a 81year old female, initially admitted to [address] [place] [address] on 7/11/2022 for STR, sent to ED on 7/18 for GIB and returned on 7/22/2022   Past Medical Hx including but not limited to HTN, DVT, AFIB, Dementia, Malnutrition, Pressure Ulcer, Generalized weakness and Ambulatory Dysfunction  seen in collaboration with nursing for medical mgmt and STR follow up  Patient will transition to long-term care at [facility] this week  Patient will continue to be followed by [facility] for routine visits  Initially patient presented to [facility] after hospitalization to [facility] from 7/1/2022 through 7/11/2022 where she was diagnosed with severe protein calorie malnutrition  Patient had intractable nausea and vomiting with generalized weakness  Patient had a PEG tube placed on 7/2/2022 and began tolerating tube feeds  Patient presented to hospital with 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 sacrum  Patient had been managed at home with her son and home health care  Imaging of sacral ulcer did not reveal any osteomyelitis and she was  prescribed cefpodoxime x4 days  Patient was discharged to rehab for her generalized weakness and malnutrition  Due to patient's dementia she was often uncooperative with therapy and often refusing care from nursing  Per son patient has an angry affect normally and is often difficult and aggressive at home as well  Patient later developed coffee-ground emesis during her rehab and was sent back to the ED  She was found to have acute cholecystitis and she did meet SIRS criteria  She was evaluated by General surgery who deemed the patient has a high risk for any surgical intervention and she was treated conservatively with antibiotics  GI was also consulted and they recommended continuing to monitor hemoglobin and transfuse as needed for hemoglobin less than 7  Patient was started on a Protonix drip and later transition to oral PPI  Her labs improved and she remained afebrile her antibiotics were then transition to oral Augmentin  Seen on exam today patient is alert but confused  She is alert x1  Patient is marking and does not answer questions appropriately    Patient is not following commands however she is awake and alert  Patient has a PEG tube which is running  Patient denies any pain  Patient has bilateral nonpitting moderate lower extremity edema as well as some bilateral upper extremity edema  Patient is followed by [address] [place] [address] wound care CRNP weekly and wound care team at [facility] for her known sacral ulcer  Staff have no concerns today  Patient has poor activity tolerance per therapy and she requires a mechanical lift with assistance out of bed x2  She is noted to often refuse getting out of bed  I had a goals of care discussion with son today  Son would like patient to remain a full code  I did discuss decline and that a PEG tube may not fix all of patient's problems  I discussed patient's inability to tolerate therapy  Son is adamant about keeping his mother a full code however is agreeable to having a conversation later if need be however at this time would want his mother hospitalized should her condition worsen  The patient's allergies, past medical, surgical, social and family history were reviewed and unchanged  Review of Systems     Review of Systems   Unable to perform ROS: Dementia         Objective     Vitals:   Vitals:    08/01/22 1040   BP: 116/64   Pulse: 99   Resp: 20   Temp: (!) 96 4 °F (35 8 °C)   SpO2: 94%         Physical Exam  Vitals and nursing note reviewed  Constitutional:       General: She is not in acute distress  Appearance: Normal appearance  She is ill-appearing  HENT:      Head: Normocephalic and atraumatic  Nose: No congestion or rhinorrhea  Mouth/Throat:      Mouth: Mucous membranes are moist    Eyes:      General: No scleral icterus  Conjunctiva/sclera: Conjunctivae normal       Pupils: Pupils are equal, round, and reactive to light  Cardiovascular:      Rate and Rhythm: Normal rate and regular rhythm  Pulses: Normal pulses  Heart sounds: Normal heart sounds   No murmur heard   Pulmonary:      Effort: Pulmonary effort is normal  No respiratory distress  Breath sounds: Normal breath sounds  No wheezing, rhonchi or rales  Abdominal:      General: Bowel sounds are normal  There is no distension  Palpations: Abdomen is soft  There is no mass  Tenderness: There is no abdominal tenderness  Hernia: No hernia is present  Musculoskeletal:         General: No swelling or tenderness  Right lower leg: Edema present  Left lower leg: Edema present  Lymphadenopathy:      Cervical: No cervical adenopathy  Skin:     General: Skin is warm and dry  Coloration: Skin is not pale  Findings: No rash  Comments: Sacral ulcer not visualized on exam today reviewed nursing notes as well as [id number] [facility]'s wound care CRNP notes   Neurological:      General: No focal deficit present  Mental Status: She is alert  Mental status is at baseline  She is disoriented  Motor: Weakness present  Gait: Gait abnormal    Psychiatric:         Mood and Affect: Mood normal          Behavior: Behavior normal          Pertinent Laboratory/Diagnostic Studies:   Reviewed in facility chart-stable      Current Medications   Medications reviewed and updated see facility [facility] for details  Current Outpatient Medications:     acetaminophen (TYLENOL) 325 mg tablet, Take 650 mg by mouth every 6 (six) hours as needed, Disp: , Rfl:     collagenase (SANTYL) ointment, Apply 1 application topically daily, Disp: , Rfl:     Eliquis 5 MG, Hold medication today, 6/30/2022 and tomorrow, 7/1/2022, Disp: 30 tablet, Rfl: 3    multivitamin (THERAGRAN) TABS, Take 1 tablet by mouth daily, Disp: , Rfl:     Skin Protectants, Misc  (PeriGuard) OINT, Apply topically, Disp: , Rfl:      Plan discussed with Dr Gaurav Godoy noted agreement with assessment and plan  Please note:  Voice-recognition software may have been used in the preparation of this document    Occasional wrong word or "sound-alike" substitutions may have occurred due to the inherent limitations of voice recognition software  Interpretation should be guided by context           JUDIE Healy  8/1/2022  10:42 AM

## 2022-08-03 ENCOUNTER — NURSING HOME VISIT (OUTPATIENT)
Dept: WOUND CARE | Facility: HOSPITAL | Age: 87
End: 2022-08-03
Payer: MEDICARE

## 2022-08-03 DIAGNOSIS — L89.150 PRESSURE INJURY OF SACRAL REGION, UNSTAGEABLE (HCC): ICD-10-CM

## 2022-08-03 DIAGNOSIS — T14.8XXA TRAUMATIC INJURY TO SKIN OR SUBCUTANEOUS TISSUE: ICD-10-CM

## 2022-08-03 DIAGNOSIS — T14.8XXA DEEP TISSUE INJURY: Primary | ICD-10-CM

## 2022-08-03 DIAGNOSIS — R41.0 CONFUSED: ICD-10-CM

## 2022-08-03 PROCEDURE — 99308 SBSQ NF CARE LOW MDM 20: CPT | Performed by: NURSE PRACTITIONER

## 2022-08-03 NOTE — PROGRESS NOTES
[address] MANAGEMENT   AND HYPERBARIC MEDICINE CENTER       Patient ID: Nathalia Allen is a 81 y o  female Date of Birth 1935     Location of Service: [facility]    Performed wound round with: Wound team       Chief complaint: multiple wounds    Wound Instructions:  Wound:  Left arm  Discontinue previous wound order  Cleanse the wound bed with NSS   Apply non-sting skin prep to periwound area  Apply bordered foam to wound bed  Frequency : three times a week and prn for soiling    Wound : BLE  Apply moisturizing lotion daily    Wound sacrum  Cleanse the wound with Dakins quarter strenght  Apply hydrocortisone 2 5% to periwound including the buttocks rashes  Gently pack the wound cavity loosely with kerlix moistened with Dakins quarter strength and cover with bordered foam  Twice a day and prn for soiling    Prevalon boots when in bed  Offload all wounds  Turn and reposition frequently, maximum of every two hours  Instruct / Assist with weight shifting every 15 - 20 minutes when in chair  Increase protein intake  Monitor for any sign of infection or worsening, inform PCP or patient's primary physician in your facility  Allergies  Patient has no known allergies  Assessment & Plan:  1  Deep tissue injury  Assessment & Plan:  Right heel  - healed    Left buttock  - healed      2  Traumatic injury to skin or subcutaneous tissue  Assessment & Plan: With wound on the left and right arm  - all stable, with no obvious sign of infection  - for open wounds, local wound care with optifoam        3  Pressure injury of sacral region, unstageable [facility])  Assessment & Plan:  - Location : Sacrum  - Wound healing status: declining, increase in size  - change treatment to Dakin's quarter strength  - Pressure redistribution device - air mattress  - Continue to offload  - Increase protein     Patient have a poor appetite  - No sign localized infection during visit  - provided update to senior care team, aware of the possibility of worsening due to patient's refusing to turn on her side and poor appetite  - Clinical factors affecting wound healing includes age, chronicity, cognitive issue co-morbidities, incontinence, location of the wound, mobility impairement, sensory deficits, behavioral issues  -Follow up : Next week           4  Confused  Assessment & Plan:  Non cooperative during visit             Subjective: Follow up for multiple wounds   Patient have a complex medical history including but not limited to dementia, DVT on Eliquis, severe protein malnutrition  Patient was referred by [facility] Team  Patient was seen in collaboration with the facility wound team      Received patient in bed, yelling  Patient is non cooperative during visit  Patient have a poor appetite  No other significant issues related to the wound  Review of Systems   Unable to perform ROS: Dementia       Objective: There were no vitals taken for this visit  Physical Exam  Constitutional:       Appearance: She is ill-appearing  HENT:      Head: Normocephalic and atraumatic  Nose: Nose normal       Mouth/Throat:      Pharynx: Oropharynx is clear  Eyes:      Conjunctiva/sclera: Conjunctivae normal    Pulmonary:      Effort: Pulmonary effort is normal    Abdominal:      Tenderness: There is no abdominal tenderness  There is no guarding  Comments: With peg tube   Genitourinary:     Comments: incontinent  Musculoskeletal:         General: No tenderness  Cervical back: Normal range of motion  Right lower leg: No edema  Left lower leg: No edema  Comments: LROM   Skin:     Findings: Lesion present        Comments: Left hip - no wound  Left buttock - no wound  Sacrum - wound size is 3 5 x 3 x 2 cm, with undermining, from 9-3 o'clock, deepest at 12 - 4 cm, 10% slough, 90% granulation, periwound and surrounding tissues - with diffuse rashes, no malodor, no obvious sign of infection    Right heel - no wound    Diffuse rashes on the buttock    Left lower arm distal - wound size is 0 5 x 1 5 x 0 1 cm  , 100% epithelial, small amount of serous drainage, no obvious sign of infection    Left lower arm proximal - wound size is 1 5 x 2 x 0 1 cm  , 100% epithelial, small amount of serous drainage, no obvious sign of infection    Right forearm proximal  - 1 5 x 1 x 0 1 cm  , 100% eschar, no drainage,   Neurological:      Mental Status: She is alert  Gait: Gait abnormal    Psychiatric:      Comments: Non-cooperative              Procedures           Patient's care was coordinated with nursing facility staff  Recent vitals, labs and updated medications were reviewed on EMR or chart system of facility  Past Medical, surgical, social, medication and allergy history and patient's previous records were reviewed and updated as appropriate:     Patient Active Problem List   Diagnosis    [facility]VT [facility] thrombosis) ([facility] )    Essential hypertension    [facility] disturbance [facility]    Generalized weakness    Severe protein-calorie malnutrition ([facility] )    Electrolyte disturbance    Stage IV pressure ulcer ([facility] )    SIRS (systemic inflammatory response syndrome) ([facility] )    Deep tissue injury    Traumatic injury to skin or subcutaneous tissue    Confused    Acute cholecystitis due to biliary calculus    Coffee ground emesis    Pressure injury of sacral region, unstageable ([facility] )     Past Medical History:   Diagnosis Date    A-fib ([facility] )     Dementia ([facility] )     DVT (deep venous thrombosis) ([facility] )     Hypertension     Pressure ulcer of sacral region      History reviewed  No pertinent surgical history  Social History     Socioeconomic History    Marital status:       Spouse name: None    Number of children: None    Years of education: None    Highest education level: None   Occupational History    None   Tobacco Use    Smoking status: Never Smoker    Smokeless tobacco: Never Used   Vaping Use    Vaping Use: Never used   Substance and Sexual Activity    Alcohol use: Not Currently    Drug use: Never    Sexual activity: Not Currently   Other Topics Concern    None   Social History Narrative    None     Social Determinants of Health     Financial Resource Strain: Not on file   Food Insecurity: No Food Insecurity    Worried About Running Out of Food in the Last Year: Never true    Stoney of Food in the Last Year: Never true   Transportation Needs: No Transportation Needs    Lack of Transportation (Medical): No    Lack of Transportation (Non-Medical): No   Physical Activity: Not on file   Stress: Not on file   Social Connections: Not on file   Intimate Partner Violence: Not on file   Housing Stability: Low Risk     Unable to Pay for Housing in the Last Year: No    Number of Places Lived in the Last Year: 2    Unstable Housing in the Last Year: No        Current Outpatient Medications:     acetaminophen (TYLENOL) 325 mg tablet, Take 650 mg by mouth every 6 (six) hours as needed, Disp: , Rfl:     collagenase (SANTYL) ointment, Apply 1 application topically daily, Disp: , Rfl:     Eliquis 5 MG, Hold medication today, 6/30/2022 and tomorrow, 7/1/2022, Disp: 30 tablet, Rfl: 3    multivitamin (THERAGRAN) TABS, Take 1 tablet by mouth daily, Disp: , Rfl:     Skin Protectants, Misc  (PeriGuard) OINT, Apply topically, Disp: , Rfl:   History reviewed  No pertinent family history  Coordination of Care: Wound team aware of the treatment plan  Facility nurse will provide wound treatment and monitor the wound for any changes  Patient / Staff education : Staff was given education on sign of infection and pressure ulcer prevention   Staff verbalized understanding     Follow up :  Next week    I have spent 25 minutes with Patient  today in which greater than 50% of this time was spent in counseling/coordination of care regarding Risks and benefits of tx options, Intructions for management, Patient and family education, Importance of tx compliance, Risk factor reductions and Impressions  Voice-recognition software may have been used in the preparation of this document  Occasional wrong word or "sound-alike" substitutions may have occurred due to the inherent limitations of voice recognition software  Interpretation should be guided by context        JUDIE Marcano

## 2022-08-03 NOTE — ASSESSMENT & PLAN NOTE
· Generalized weakness in the setting of severe protein malnutrition   · Patient and son opted for PEG tube placement which was done on 7/2/2022 patient was noted to have some issues with delayed gastric emptying while hospitalized with episodes of vomiting as well as acute cholecystitis   · Son wants to continue full code  · Encourage p o  intake with pureed/thin  · Reglan p r n     · Patient has completed her therapy unfortunately due to patient's dementia with aggressive behavior she is often uncooperative with therapy or care  · Son states patient at baseline was able to sit on edge of bed  · Per  patient will transition to long-term care at [facility] this week

## 2022-08-03 NOTE — ASSESSMENT & PLAN NOTE
Malnutrition Findings:   · Chronic illness  · Activity intolerance  · Frail cachectic appearance   · Albumin 2 2 and protein 4 4    BMI Findings:  · Poor p o  intake   · S/p PEG tube placement 7/2/2022  · Continue Jevity   · Registered dietitian to follow at [facility]    Body mass index is 27 5 kg/m²

## 2022-08-03 NOTE — ASSESSMENT & PLAN NOTE
With wound on the left and right arm  - all stable, with no obvious sign of infection  - for open wounds, local wound care with optifoam

## 2022-08-03 NOTE — ASSESSMENT & PLAN NOTE
· Present on admission to inpatient hospital stay   · Wound was not visualized on exam today  · Per review of nursing report there is undermining  · Patient was previously treated with Dakin's rinse/santal   · Offload  · Provide incontinence care  · [id number] [place] wound care CRNP to follow during weekly rounds-appreciate recommendations

## 2022-08-03 NOTE — ASSESSMENT & PLAN NOTE
- Location : Sacrum  - Wound healing status: declining, increase in size  - change treatment to Dakin's quarter strength  - Pressure redistribution device - air mattress  - Continue to offload  - Increase protein     Patient have a poor appetite  - No sign localized infection during visit  - provided update to senior care team, aware of the possibility of worsening due to patient's refusing to turn on her side and poor appetite  - Clinical factors affecting wound healing includes age, chronicity, cognitive issue co-morbidities, incontinence, location of the wound, mobility impairement, sensory deficits, behavioral issues  -Follow up : Next week

## 2022-08-08 ENCOUNTER — NURSING HOME VISIT (OUTPATIENT)
Dept: WOUND CARE | Facility: HOSPITAL | Age: 87
End: 2022-08-08
Payer: MEDICARE

## 2022-08-08 DIAGNOSIS — R41.0 CONFUSED: ICD-10-CM

## 2022-08-08 DIAGNOSIS — T14.8XXA DEEP TISSUE INJURY: ICD-10-CM

## 2022-08-08 DIAGNOSIS — T14.8XXA TRAUMATIC INJURY TO SKIN OR SUBCUTANEOUS TISSUE: ICD-10-CM

## 2022-08-08 DIAGNOSIS — L89.150 PRESSURE INJURY OF SACRAL REGION, UNSTAGEABLE (HCC): Primary | ICD-10-CM

## 2022-08-08 PROCEDURE — 99308 SBSQ NF CARE LOW MDM 20: CPT | Performed by: NURSE PRACTITIONER

## 2022-08-08 NOTE — ASSESSMENT & PLAN NOTE
- Location : Sacrum  - Wound healing status: decreased in size  - continue Dakin's quarter strength  - Pressure redistribution device - air mattress  - Continue to offload  - Increase protein     Patient have a poor appetite  - No sign localized infection during visit  - provided update to senior care team, aware of the possibility of worsening due to patient's refusing to turn on her side and poor appetite  - Clinical factors affecting wound healing includes age, chronicity, cognitive issue co-morbidities, incontinence, location of the wound, mobility impairement, sensory deficits, behavioral issues  -Follow up : Next week

## 2022-08-08 NOTE — PROGRESS NOTES
[address] [facility]NAG[facility]   [facility]       Patient ID: Ananda Sherwood is a 81 y o  female Date of Birth 1935     Location of Service: [address] [place] [address]    Performed wound round with: Wound team       Chief complaint: multiple wounds    Wound Instructions:  Wound:  Left arm  Discontinue previous wound order  Cleanse the wound bed with NSS   Apply non-sting skin prep to periwound area  Apply bordered foam to wound bed  Frequency : three times a week and prn for soiling    Wound : BLE  Apply moisturizing lotion daily    Wound sacrum  Cleanse the wound with Dakins quarter strenght  Apply Zguard to periwound and buttocks  Gently pack the wound cavity loosely with kerlix moistened with Dakins quarter strength and cover with bordered foam  Twice a day and prn for soiling    Prevalon boots when in bed  Offload all wounds  Turn and reposition frequently, maximum of every two hours  Instruct / Assist with weight shifting every 15 - 20 minutes when in chair  Increase protein intake  Monitor for any sign of infection or worsening, inform PCP or patient's primary physician in your facility  Allergies  Patient has no known allergies  Assessment & Plan:  1  Pressure injury of sacral region, unstageable [facility])  Assessment & Plan:  - Location : Sacrum  - Wound healing status: decreased in size  - continue Dakin's quarter strength  - Pressure redistribution device - air mattress  - Continue to offload  - Increase protein   Patient have a poor appetite  - No sign localized infection during visit  - provided update to senior care team, aware of the possibility of worsening due to patient's refusing to turn on her side and poor appetite  - Clinical factors affecting wound healing includes age, chronicity, cognitive issue co-morbidities, incontinence, location of the wound, mobility impairement, sensory deficits, behavioral issues  -Follow up : Next week           2   Deep tissue injury  Assessment & Plan:  Left buttock  - started as a deep tissue injury on admission  - re-opened, 100% slough, with no obvious sign of infection  - local wound care with Z guard  - continued offload  - followup next week      3  Traumatic injury to skin or subcutaneous tissue  Assessment & Plan: With wound on the left and right arm  - wounds on the right arm - all healed  - for open wounds, local wound care with optifoam  - follow up next week        4  Confused  Assessment & Plan:  Non cooperative during visit             Subjective:   8/3/2022 Follow up for multiple wounds   Patient have a complex medical history including but not limited to dementia, DVT on Eliquis, severe protein malnutrition  Patient was referred by Senior Care Team  Patient was seen in collaboration with the facility wound team      Received patient in bed, elias  Patient is non cooperative during visit  Patient have a poor appetite  No other significant issues related to the wound  8/8/2022 Follow up for wound on the left buttock, sacrum, right arm and left arm   Received patient, yelling, non-cooperative  Facility staff did not report any significant issues related to the wound  Review of Systems   Unable to perform ROS: Dementia       Objective: There were no vitals taken for this visit  Physical Exam  Constitutional:       Appearance: She is ill-appearing  HENT:      Head: Normocephalic and atraumatic  Nose: Nose normal       Mouth/Throat:      Pharynx: Oropharynx is clear  Eyes:      Conjunctiva/sclera: Conjunctivae normal    Pulmonary:      Effort: Pulmonary effort is normal    Abdominal:      Tenderness: There is no abdominal tenderness  There is no guarding  Comments: With peg tube   Genitourinary:     Comments: incontinent  Musculoskeletal:         General: No tenderness  Cervical back: Normal range of motion  Right lower leg: No edema  Left lower leg: No edema        Comments: LROM   Skin: Findings: Lesion present  Comments: Left hip - no wound  Left buttock - wound size is 0 5 x 0 5 cm, 100% slough, small amount of serous drainage, no obvious sign of infection  Sacrum - wound size is 2 5  x 1 7  X 1 5  cm, with undermining, from 9-3 o'clock, deepest at 12 - 4 cm, 50% slough, 50% granulation, periwound- narda, no obvious sign of infection, + probe to bone    Right heel - no wound    Left lower arm distal -healed    Left lower arm proximal - wound size is 0 2  X  2 x 0 1 cm  , 100% epithelial, small amount of serous drainage, no obvious sign of infection    Right forearm proximal  - healed    Neurological:      Mental Status: She is alert  Gait: Gait abnormal    Psychiatric:      Comments: Non-cooperative              Procedures           Patient's care was coordinated with nursing facility staff  Recent vitals, labs and updated medications were reviewed on EMR or chart system of facility  Past Medical, surgical, social, medication and allergy history and patient's previous records were reviewed and updated as appropriate:     Patient Active Problem List   Diagnosis    [facility]    Essential hypertension    Dementia with behavioral disturbance ([facility] )    Generalized weakness    Severe protein-calorie malnutrition ([address] )    Electrolyte disturbance    Stage IV pressure ulcer ([facility] )    SIRS (systemic inflammatory response syndrome) ([address] )    Deep tissue injury    Traumatic injury to skin or subcutaneous tissue    Confused    Acute cholecystitis due to biliary calculus    Coffee ground emesis    Pressure injury of sacral region, unstageable ([facility] )     Past Medical History:   Diagnosis Date    A-fib ([facility] )     Dementia ([facility] )     DVT (deep venous thrombosis) ([facility] )     Hypertension     Pressure ulcer of sacral region      History reviewed  No pertinent surgical history  Social History     Socioeconomic History    Marital status:       Spouse name: None  Number of children: None    Years of education: None    Highest education level: None   Occupational History    None   Tobacco Use    Smoking status: Never Smoker    Smokeless tobacco: Never Used   Vaping Use    Vaping Use: Never used   Substance and Sexual Activity    Alcohol use: Not Currently    Drug use: Never    Sexual activity: Not Currently   Other Topics Concern    None   Social History Narrative    None     Social Determinants of Health     Financial Resource Strain: Not on file   Food Insecurity: No Food Insecurity    Worried About Running Out of Food in the Last Year: Never true    Stoney of Food in the Last Year: Never true   Transportation Needs: No Transportation Needs    Lack of Transportation (Medical): No    Lack of Transportation (Non-Medical): No   Physical Activity: Not on file   Stress: Not on file   Social Connections: Not on file   Intimate Partner Violence: Not on file   Housing Stability: Low Risk     Unable to Pay for Housing in the Last Year: No    Number of Places Lived in the Last Year: 2    Unstable Housing in the Last Year: No        Current Outpatient Medications:     acetaminophen (TYLENOL) 325 mg tablet, Take 650 mg by mouth every 6 (six) hours as needed, Disp: , Rfl:     collagenase (SANTYL) ointment, Apply 1 application topically daily, Disp: , Rfl:     Eliquis 5 MG, Hold medication today, 6/30/2022 and tomorrow, 7/1/2022, Disp: 30 tablet, Rfl: 3    multivitamin (THERAGRAN) TABS, Take 1 tablet by mouth daily, Disp: , Rfl:     Skin Protectants, Misc  (PeriGuard) OINT, Apply topically, Disp: , Rfl:   History reviewed  No pertinent family history  Coordination of Care: Wound team aware of the treatment plan  Facility nurse will provide wound treatment and monitor the wound for any changes  Patient / Staff education : Staff was given education on sign of infection and pressure ulcer prevention   Staff verbalized understanding     Follow up :  Next week      Voice-recognition software may have been used in the preparation of this document  Occasional wrong word or "sound-alike" substitutions may have occurred due to the inherent limitations of voice recognition software  Interpretation should be guided by context        JUDIE Baker

## 2022-08-08 NOTE — ASSESSMENT & PLAN NOTE
Left buttock  - started as a deep tissue injury on admission  - re-opened, 100% slough, with no obvious sign of infection  - local wound care with Z guard  - continued offload  - followup next week

## 2022-08-08 NOTE — ASSESSMENT & PLAN NOTE
With wound on the left and right arm  - wounds on the right arm - all healed  - for open wounds, local wound care with optifoam  - follow up next week

## 2022-08-16 ENCOUNTER — TELEPHONE (OUTPATIENT)
Dept: OTHER | Facility: OTHER | Age: 87
End: 2022-08-16

## 2022-08-17 ENCOUNTER — NURSING HOME VISIT (OUTPATIENT)
Dept: WOUND CARE | Facility: HOSPITAL | Age: 87
End: 2022-08-17
Payer: MEDICARE

## 2022-08-17 DIAGNOSIS — L89.154 PRESSURE INJURY OF SACRAL REGION, STAGE 4 (HCC): Primary | ICD-10-CM

## 2022-08-17 DIAGNOSIS — T14.8XXA DEEP TISSUE INJURY: ICD-10-CM

## 2022-08-17 PROCEDURE — 99308 SBSQ NF CARE LOW MDM 20: CPT | Performed by: NURSE PRACTITIONER

## 2022-08-17 NOTE — PROGRESS NOTES
[address] [facility]NAGEMENT   [facility]       Patient ID: Bautista Steward is a 81 y o  female Date of Birth 1935     Location of Service: [address] [place] [address]    Performed wound round with: Wound team       Chief complaint: sacrum and left buttock    Wound Instructions:    Wound sacrum  Cleanse the wound with Dakins quarter strenght  Apply Zguard to periwound and buttocks  Gently pack the wound cavity loosely with kerlix moistened with Dakins quarter strength and cover with bordered foam  Twice a day and prn for soiling    Prevalon boots when in bed  Offload all wounds  Turn and reposition frequently, maximum of every two hours  Instruct / Assist with weight shifting every 15 - 20 minutes when in chair  Increase protein intake  Monitor for any sign of infection or worsening, inform PCP or patient's primary physician in your facility  Allergies  Patient has no known allergies  Assessment & Plan:  1  Pressure injury of sacral region, stage 4 ([facility])  Assessment & Plan:  - Location : Sacrum  - Wound healing status: Worsened, 13% increase in the wound area compared to last week  - continue Dakin's quarter strength  - Pressure redistribution device - air mattress  - Continue to offload  - Increase protein   Patient have a poor appetite  - No sign localized infection during visit  - provided update to senior care team, aware of the possibility of worsening due to patient's refusing to turn on her side and poor appetite  - Clinical factors affecting wound healing includes age, chronicity, cognitive issue co-morbidities, incontinence, location of the wound, mobility impairement, sensory deficits, behavioral issues  -Follow up : Next week           2  Deep tissue injury  Assessment & Plan:  Left buttock  - started as a deep tissue injury on admission  - healed  - local wound care with Z guard  - continued offload               Subjective:   8/3/2022 Follow up for multiple wounds    Patient have a complex medical history including but not limited to dementia, DVT on Eliquis, severe protein malnutrition  Patient was referred by Senior Care Team  Patient was seen in collaboration with the facility wound team      Received patient in bed, elias  Patient is non cooperative during visit  Patient have a poor appetite  No other significant issues related to the wound  8/8/2022 Follow up for wound on the left buttock, sacrum, right arm and left arm   Received patient, yelling, non-cooperative  Facility staff did not report any significant issues related to the wound  8/17/2022 Follow up for wound on the sacrum and left buttock   Received patient in bed, yelling and non cooperative  Patient had been refusing to be turned  Review of Systems   Unable to perform ROS: Dementia       Objective: There were no vitals taken for this visit  Physical Exam  Constitutional:       Appearance: She is ill-appearing  HENT:      Head: Normocephalic and atraumatic  Nose: Nose normal       Mouth/Throat:      Pharynx: Oropharynx is clear  Eyes:      Conjunctiva/sclera: Conjunctivae normal    Pulmonary:      Effort: Pulmonary effort is normal    Abdominal:      Tenderness: There is no abdominal tenderness  There is no guarding  Comments: With peg tube   Genitourinary:     Comments: incontinent  Musculoskeletal:         General: No tenderness  Cervical back: Normal range of motion  Right lower leg: No edema  Left lower leg: No edema  Comments: LROM   Skin:     Findings: Lesion present  Comments: Left hip - no wound  Left buttock - healed  Sacrum - wound size is 3 46 x 2 53 times 2 cm  cm, with undermining all over,  deepest at 12 - 4 cm, 100% granulation, periwound- normal, no obvious sign of infection, + probe to bone     Neurological:      Mental Status: She is alert        Gait: Gait abnormal    Psychiatric:      Comments: Non-cooperative              Procedures Patient's care was coordinated with nursing facility staff  Recent vitals, labs and updated medications were reviewed on EMR or chart system of facility  Past Medical, surgical, social, medication and allergy history and patient's previous records were reviewed and updated as appropriate:     Patient Active Problem List   Diagnosis    DVT (deep venous thrombosis) ([place] [address] )    Essential hypertension    Dementia with behavioral disturbance ([place] [address] )    Generalized weakness    Severe protein-calorie malnutrition ([place] [address] )    Electrolyte disturbance    Stage IV pressure ulcer ([place] [address] )    SIRS (systemic inflammatory response syndrome) ([place] [address] )    Deep tissue injury    Traumatic injury to skin or subcutaneous tissue    Confused    Acute cholecystitis due to biliary calculus    Coffee ground emesis    Pressure injury of sacral region, stage 4 ([place] [address] )     Past Medical History:   Diagnosis Date    A-fib ([place] [address] )     Dementia ([place] [address] )     DVT (deep venous thrombosis) ([place] [address] )     Hypertension     Pressure ulcer of sacral region      No past surgical history on file  Social History     Socioeconomic History    Marital status:       Spouse name: Not on file    Number of children: Not on file    Years of education: Not on file    Highest education level: Not on file   Occupational History    Not on file   Tobacco Use    Smoking status: Never Smoker    Smokeless tobacco: Never Used   Vaping Use    Vaping Use: Never used   Substance and Sexual Activity    Alcohol use: Not Currently    Drug use: Never    Sexual activity: Not Currently   Other Topics Concern    Not on file   Social History Narrative    Not on file     Social Determinants of Health     Financial Resource Strain: Not on file   Food Insecurity: No Food Insecurity    Worried About Running Out of Food in the Last Year: Never true    Stoney of Food in the Last Year: Never true   Transportation Needs: No Transportation Needs    Lack of Transportation (Medical): No    Lack of Transportation (Non-Medical): No   Physical Activity: Not on file   Stress: Not on file   Social Connections: Not on file   Intimate Partner Violence: Not on file   Housing Stability: Low Risk     Unable to Pay for Housing in the Last Year: No    Number of Places Lived in the Last Year: 2    Unstable Housing in the Last Year: No        Current Outpatient Medications:     acetaminophen (TYLENOL) 325 mg tablet, Take 650 mg by mouth every 6 (six) hours as needed, Disp: , Rfl:     collagenase (SANTYL) ointment, Apply 1 application topically daily, Disp: , Rfl:     Eliquis 5 MG, Hold medication today, 6/30/2022 and tomorrow, 7/1/2022, Disp: 30 tablet, Rfl: 3    multivitamin (THERAGRAN) TABS, Take 1 tablet by mouth daily, Disp: , Rfl:     Skin Protectants, Misc  (PeriGuard) OINT, Apply topically, Disp: , Rfl:   No family history on file  Coordination of Care: Wound team aware of the treatment plan  Facility nurse will provide wound treatment and monitor the wound for any changes  Provided update to Senior care team     Patient / Staff education : Staff was given education on sign of infection and pressure ulcer prevention  Staff verbalized understanding     Follow up :  Next week      Voice-recognition software may have been used in the preparation of this document  Occasional wrong word or "sound-alike" substitutions may have occurred due to the inherent limitations of voice recognition software  Interpretation should be guided by context   I have spent 20 minutes with Patient  today in which greater than 50% of this time was spent in counseling/coordination of care regarding Risks and benefits of tx options, Intructions for management, Risk factor reductions and Impressions            JUDIE Fernandes

## 2022-08-17 NOTE — ASSESSMENT & PLAN NOTE
- Location : Sacrum  - Wound healing status: Worsened, 13% increase in the wound area compared to last week  - continue Dakin's quarter strength  - Pressure redistribution device - air mattress  - Continue to offload  - Increase protein     Patient have a poor appetite  - No sign localized infection during visit  - provided update to senior care team, aware of the possibility of worsening due to patient's refusing to turn on her side and poor appetite  - Clinical factors affecting wound healing includes age, chronicity, cognitive issue co-morbidities, incontinence, location of the wound, mobility impairement, sensory deficits, behavioral issues  -Follow up : Next week

## 2022-08-17 NOTE — ASSESSMENT & PLAN NOTE
Left buttock  - started as a deep tissue injury on admission  - healed  - local wound care with Z guard  - continued offload

## 2022-08-20 ENCOUNTER — TELEPHONE (OUTPATIENT)
Dept: OTHER | Facility: OTHER | Age: 87
End: 2022-08-20

## 2022-08-24 ENCOUNTER — NURSING HOME VISIT (OUTPATIENT)
Dept: WOUND CARE | Facility: HOSPITAL | Age: 87
End: 2022-08-24
Payer: MEDICARE

## 2022-08-24 DIAGNOSIS — Z91.199 NON COMPLIANCE WITH MEDICAL TREATMENT: ICD-10-CM

## 2022-08-24 DIAGNOSIS — L89.154 PRESSURE INJURY OF SACRAL REGION, STAGE 4 (HCC): Primary | ICD-10-CM

## 2022-08-24 PROBLEM — Z91.19 NON COMPLIANCE WITH MEDICAL TREATMENT: Status: ACTIVE | Noted: 2022-08-24

## 2022-08-24 PROCEDURE — 99308 SBSQ NF CARE LOW MDM 20: CPT | Performed by: NURSE PRACTITIONER

## 2022-08-24 NOTE — PROGRESS NOTES
[address] [facility]NA[facility]   [facility]       Patient ID: Eduardo Higgins is a 81 y o  female Date of Birth 1935     Location of Service: [address] [place] [address]    Performed wound round with: Wound team       Chief complaint: sacrum     Wound Instructions:    Wound sacrum  Cleanse the wound with Dakins quarter strenght  Apply Zguard to periwound and buttocks  Gently pack the wound cavity loosely with kerlix moistened with Dakins quarter strength and cover with bordered foam  Twice a day and prn for soiling    Refer to PT for E-stim  Prevalon boots when in bed  Offload all wounds  Turn and reposition frequently, maximum of every two hours  Instruct / Assist with weight shifting every 15 - 20 minutes when in chair  Increase protein intake  Monitor for any sign of infection or worsening, inform PCP or patient's primary physician in your facility  Allergies  Patient has no known allergies  Assessment & Plan:  1  Pressure injury of sacral region, stage 4 ([facility])  Assessment & Plan:  - Location : Sacrum  - Wound healing status: Worsened, 17% increase in the wound area compared to last week  - continue Dakin's quarter strength  - Pressure redistribution device - air mattress  - Continue to offload  - Increase protein   Patient have a poor appetite  - No sign localized infection during visit  - provided update to senior care team, aware of the possibility of worsening due to patient's refusing to turn on her side and poor appetite  - Patient refused xray of sacrum   - Refer to PT for E-stim  - Clinical factors affecting wound healing includes age, chronicity, cognitive issue co-morbidities, incontinence, location of the wound, mobility impairement, sensory deficits, behavioral issues  -Follow up : Next week           2  Non compliance with medical treatment  Assessment & Plan:  Patient is non- ooperative and had been refusing to turned on her side  - patient refuse x-ray for the sacrum  Subjective:   8/3/2022 Follow up for multiple wounds   Patient have a complex medical history including but not limited to dementia, DVT on Eliquis, severe protein malnutrition  Patient was referred by Senior Care Team  Patient was seen in collaboration with the facility wound team      Received patient in bed, elias  Patient is non cooperative during visit  Patient have a poor appetite  No other significant issues related to the wound  8/8/2022 Follow up for wound on the left buttock, sacrum, right arm and left arm   Received patient, yelling, non-cooperative  Facility staff did not report any significant issues related to the wound  8/17/2022 Follow up for wound on the sacrum and left buttock   Received patient in bed, yelling and non cooperative  Patient had been refusing to be turned  8/24/2022 Follow up for wound on the sacrum   Received patient, agitated  As per report, patient refused xray of the sacrum  Review of Systems   Unable to perform ROS: Dementia       Objective: There were no vitals taken for this visit  Physical Exam  Constitutional:       Appearance: She is ill-appearing  HENT:      Head: Normocephalic and atraumatic  Nose: Nose normal       Mouth/Throat:      Pharynx: Oropharynx is clear  Eyes:      Conjunctiva/sclera: Conjunctivae normal    Pulmonary:      Effort: Pulmonary effort is normal    Abdominal:      Tenderness: There is no abdominal tenderness  There is no guarding  Comments: With peg tube   Genitourinary:     Comments: incontinent  Musculoskeletal:         General: No tenderness  Cervical back: Normal range of motion  Right lower leg: No edema  Left lower leg: No edema  Comments: LROM   Skin:     Findings: Lesion present        Comments: Left hip - no wound  Left buttock - healed  Sacrum - wound size is 3 14 x 2 2 82  cm, with undermining 9 - 3,  deepest at 9 - 3 cm, 50% granulation, 50% slough, with malodor, periwound- normal, no obvious sign of infection, + probe to bone, small amount of serosanguineous drainage     Neurological:      Mental Status: She is alert  Gait: Gait abnormal    Psychiatric:      Comments: Non-cooperative              Procedures           Patient's care was coordinated with nursing facility staff  Recent vitals, labs and updated medications were reviewed on EMR or chart system of facility  Past Medical, surgical, social, medication and allergy history and patient's previous records were reviewed and updated as appropriate:     Patient Active Problem List   Diagnosis    DVT (deep venous thrombosis) ([place] [address] )    Essential hypertension    Dementia with behavioral disturbance ([place] [address] )    Generalized weakness    Severe protein-calorie malnutrition ([place] [address] )    Electrolyte disturbance    Stage IV pressure ulcer ([place] [address] )    SIRS (systemic inflammatory response syndrome) ([place] [address] )    Deep tissue injury    Traumatic injury to skin or subcutaneous tissue    Confused    Acute cholecystitis due to biliary calculus    Coffee ground emesis    Pressure injury of sacral region, stage 4 (HCC)    Non compliance with medical treatment     Past Medical History:   Diagnosis Date    A-fib ([place] [address] )     Dementia ([place] [address] )     DVT (deep venous thrombosis) ([place] [address] )     Hypertension     Pressure ulcer of sacral region      History reviewed  No pertinent surgical history  Social History     Socioeconomic History    Marital status:       Spouse name: None    Number of children: None    Years of education: None    Highest education level: None   Occupational History    None   Tobacco Use    Smoking status: Never Smoker    Smokeless tobacco: Never Used   Vaping Use    Vaping Use: Never used   Substance and Sexual Activity    Alcohol use: Not Currently    Drug use: Never    Sexual activity: Not Currently   Other Topics Concern    None   Social History Narrative    None     Social Determinants of Health Financial Resource Strain: Not on file   Food Insecurity: No Food Insecurity    Worried About Running Out of Food in the Last Year: Never true    Stoney of Food in the Last Year: Never true   Transportation Needs: No Transportation Needs    Lack of Transportation (Medical): No    Lack of Transportation (Non-Medical): No   Physical Activity: Not on file   Stress: Not on file   Social Connections: Not on file   Intimate Partner Violence: Not on file   Housing Stability: Low Risk     Unable to Pay for Housing in the Last Year: No    Number of Places Lived in the Last Year: 2    Unstable Housing in the Last Year: No        Current Outpatient Medications:     acetaminophen (TYLENOL) 325 mg tablet, Take 650 mg by mouth every 6 (six) hours as needed, Disp: , Rfl:     collagenase (SANTYL) ointment, Apply 1 application topically daily, Disp: , Rfl:     Eliquis 5 MG, Hold medication today, 6/30/2022 and tomorrow, 7/1/2022, Disp: 30 tablet, Rfl: 3    multivitamin (THERAGRAN) TABS, Take 1 tablet by mouth daily, Disp: , Rfl:     Skin Protectants, Misc  (PeriGuard) OINT, Apply topically, Disp: , Rfl:   History reviewed  No pertinent family history  Coordination of Care: Wound team aware of the treatment plan  Facility nurse will provide wound treatment and monitor the wound for any changes  Provided update to Senior care team     Patient / Staff education : Staff was given education on sign of infection and pressure ulcer prevention  Staff verbalized understanding     Follow up :  Next week      Voice-recognition software may have been used in the preparation of this document  Occasional wrong word or "sound-alike" substitutions may have occurred due to the inherent limitations of voice recognition software  Interpretation should be guided by context       I have spent 20 minutes with Patient  today in which greater than 50% of this time was spent in counseling/coordination of care regarding Risks and benefits of tx options, Intructions for management, Risk factor reductions and Impressions            JUDIE Maki

## 2022-08-24 NOTE — ASSESSMENT & PLAN NOTE
- Location : Sacrum  - Wound healing status: Worsened, 17% increase in the wound area compared to last week  - continue Dakin's quarter strength  - Pressure redistribution device - air mattress  - Continue to offload  - Increase protein     Patient have a poor appetite  - No sign localized infection during visit  - provided update to senior care team, aware of the possibility of worsening due to patient's refusing to turn on her side and poor appetite  - Patient refused xray of sacrum   - Refer to PT for E-stim  - Clinical factors affecting wound healing includes age, chronicity, cognitive issue co-morbidities, incontinence, location of the wound, mobility impairement, sensory deficits, behavioral issues  -Follow up : Next week

## 2022-08-24 NOTE — ASSESSMENT & PLAN NOTE
Patient is non- ooperative and had been refusing to turned on her side  - patient refuse x-ray for the sacrum

## 2022-09-04 ENCOUNTER — TELEPHONE (OUTPATIENT)
Dept: OTHER | Facility: OTHER | Age: 87
End: 2022-09-04

## 2022-09-07 ENCOUNTER — NURSING HOME VISIT (OUTPATIENT)
Dept: WOUND CARE | Facility: HOSPITAL | Age: 87
End: 2022-09-07
Payer: MEDICARE

## 2022-09-07 DIAGNOSIS — L89.154 PRESSURE INJURY OF SACRAL REGION, STAGE 4 (HCC): Primary | ICD-10-CM

## 2022-09-07 DIAGNOSIS — Z91.199 NON COMPLIANCE WITH MEDICAL TREATMENT: ICD-10-CM

## 2022-09-07 PROCEDURE — 99308 SBSQ NF CARE LOW MDM 20: CPT | Performed by: NURSE PRACTITIONER

## 2022-09-07 NOTE — ASSESSMENT & PLAN NOTE
- Location : Sacrum  - Wound healing status:  Wound size increased,  - continue Dakin's quarter strength for cleansing and start on wound vac  - Pressure redistribution device - air mattress  - Continue to offload  - Increase protein      - No sign localized infection during visit  - Refer to PT for E-stim  - Clinical factors affecting wound healing includes age, chronicity, cognitive issue co-morbidities, incontinence, location of the wound, mobility impairement, sensory deficits, behavioral issues  -Follow up : Next week

## 2022-09-07 NOTE — PROGRESS NOTES
[address] [facility]   [facility]       Patient ID: Ludmila Elder is a 81 y o  female Date of Birth 1935     Location of Service: [facility]    Performed wound round with: Wound team       Chief complaint: sacrum     Wound Instructions:    Wound sacrum  Cleanse the wound with Dakins quarter strenght  Apply skin prep to periwound area  Apply wound vac to wound bed, black foam 125 mmhg, continuous  Three ttimes a week and prn for soiling    Refer to PT for E-stim  Prevalon boots when in bed  Offload all wounds  Turn and reposition frequently, maximum of every two hours  Instruct / Assist with weight shifting every 15 - 20 minutes when in chair  Increase protein intake  Monitor for any sign of infection or worsening, inform PCP or patient's primary physician in your facility  Allergies  Patient has no known allergies  Assessment & Plan:  1  Pressure injury of sacral region, stage 4 ([facility])  Assessment & Plan:  - Location : Sacrum  - Wound healing status:  Wound size increased,  - continue Dakin's quarter strength for cleansing and start on wound vac  - Pressure redistribution device - air mattress  - Continue to offload  - Increase protein   - No sign localized infection during visit  - Refer to PT for E-stim  - Clinical factors affecting wound healing includes age, chronicity, cognitive issue co-morbidities, incontinence, location of the wound, mobility impairement, sensory deficits, behavioral issues  -Follow up : Next week           2  Non compliance with medical treatment  Assessment & Plan:  Patient is non- cooperative and had been refusing to turned on her side  Subjective:   8/3/2022 Follow up for multiple wounds   Patient have a complex medical history including but not limited to dementia, DVT on Eliquis, severe protein malnutrition   Patient was referred by Senior Care Team  Patient was seen in collaboration with the facility wound team  Received patient in bed, yelling  Patient is non cooperative during visit  Patient have a poor appetite  No other significant issues related to the wound  8/8/2022 Follow up for wound on the left buttock, sacrum, right arm and left arm   Received patient, yelling, non-cooperative  Facility staff did not report any significant issues related to the wound  8/17/2022 Follow up for wound on the sacrum and left buttock   Received patient in bed, yelling and non cooperative  Patient had been refusing to be turned  8/24/2022 Follow up for wound on the sacrum   Received patient, agitated  As per report, patient refused xray of the sacrum  9/7/2022 Follow up for wound on the sacrum  Received patient, non cooperative during visit  Facility staff did not report any significant issues related to the wound  Review of Systems   Unable to perform ROS: Dementia       Objective: There were no vitals taken for this visit  Physical Exam  Constitutional:       Appearance: Normal appearance  HENT:      Head: Normocephalic and atraumatic  Nose: Nose normal       Mouth/Throat:      Pharynx: Oropharynx is clear  Eyes:      Conjunctiva/sclera: Conjunctivae normal    Pulmonary:      Effort: Pulmonary effort is normal    Abdominal:      Tenderness: There is no abdominal tenderness  There is no guarding  Comments: With peg tube   Genitourinary:     Comments: incontinent  Musculoskeletal:         General: No tenderness  Cervical back: Normal range of motion  Right lower leg: No edema  Left lower leg: No edema  Comments: LROM   Skin:     Findings: Lesion present        Comments: Left hip - no wound  Left buttock - healed  Sacrum - wound size is 3 17 x 3 34 x 2  cm, with undermining all around,  deepest at 12 - 5 cm, 90% granulation, 10% slough, with malodor, periwound- normal, no obvious sign of infection, + probe to bone, small amount of serosanguineous drainage Neurological:      Mental Status: She is alert  Gait: Gait abnormal    Psychiatric:      Comments: Non-cooperative              Procedures           Patient's care was coordinated with nursing facility staff  Recent vitals, labs and updated medications were reviewed on EMR or chart system of facility  Past Medical, surgical, social, medication and allergy history and patient's previous records were reviewed and updated as appropriate:     Patient Active Problem List   Diagnosis    DVT (deep venous thrombosis) ([place] [address] )    Essential hypertension    Dementia with behavioral disturbance ([place] [address] )    Generalized weakness    Severe protein-calorie malnutrition ([place] [address] )    Electrolyte disturbance    Stage IV pressure ulcer ([place] [address] )    SIRS (systemic inflammatory response syndrome) ([place] [address] )    Deep tissue injury    Traumatic injury to skin or subcutaneous tissue    Confused    Acute cholecystitis due to biliary calculus    Coffee ground emesis    Pressure injury of sacral region, stage 4 (HCC)    Non compliance with medical treatment     Past Medical History:   Diagnosis Date    A-fib ([place] [address] )     Dementia ([place] [address] )     DVT (deep venous thrombosis) ([place] [address] )     Hypertension     Pressure ulcer of sacral region      History reviewed  No pertinent surgical history  Social History     Socioeconomic History    Marital status:       Spouse name: None    Number of children: None    Years of education: None    Highest education level: None   Occupational History    None   Tobacco Use    Smoking status: Never Smoker    Smokeless tobacco: Never Used   Vaping Use    Vaping Use: Never used   Substance and Sexual Activity    Alcohol use: Not Currently    Drug use: Never    Sexual activity: Not Currently   Other Topics Concern    None   Social History Narrative    None     Social Determinants of Health     Financial Resource Strain: Not on file   Food Insecurity: No Food Insecurity    Worried About Running Out of Food in the Last Year: Never true    [address] in the Last Year: Never true   Transportation Needs: No Transportation Needs    Lack of Transportation (Medical): No    Lack of Transportation (Non-Medical): No   Physical Activity: Not on file   Stress: Not on file   Social Connections: Not on file   Intimate Partner Violence: Not on file   Housing Stability: Low Risk     Unable to Pay for Housing in the Last Year: No    Number of Places Lived in the Last Year: 2    Unstable Housing in the Last Year: No        Current Outpatient Medications:     acetaminophen (TYLENOL) 325 mg tablet, Take 650 mg by mouth every 6 (six) hours as needed, Disp: , Rfl:     collagenase (SANTYL) ointment, Apply 1 application topically daily, Disp: , Rfl:     Eliquis 5 MG, Hold medication today, 6/30/2022 and tomorrow, 7/1/2022, Disp: 30 tablet, Rfl: 3    multivitamin (THERAGRAN) TABS, Take 1 tablet by mouth daily, Disp: , Rfl:     Skin Protectants, Misc  (PeriGuard) OINT, Apply topically, Disp: , Rfl:   History reviewed  No pertinent family history  Coordination of Care: Wound team aware of the treatment plan  Facility nurse will provide wound treatment and monitor the wound for any changes  Provided update to Senior care team     Patient / Staff education : Staff was given education on sign of infection and pressure ulcer prevention  Staff verbalized understanding     Follow up :  Next week      Voice-recognition software may have been used in the preparation of this document  Occasional wrong word or "sound-alike" substitutions may have occurred due to the inherent limitations of voice recognition software  Interpretation should be guided by context      Adry Jose

## 2022-09-14 ENCOUNTER — NURSING HOME VISIT (OUTPATIENT)
Dept: WOUND CARE | Facility: HOSPITAL | Age: 87
End: 2022-09-14
Payer: MEDICARE

## 2022-09-14 DIAGNOSIS — L89.154 PRESSURE INJURY OF SACRAL REGION, STAGE 4 (HCC): Primary | ICD-10-CM

## 2022-09-14 PROCEDURE — 99308 SBSQ NF CARE LOW MDM 20: CPT | Performed by: NURSE PRACTITIONER

## 2022-09-14 NOTE — ASSESSMENT & PLAN NOTE
- Location : Sacrum  - Wound healing status:  Decreased in depth and wound bed improved  - continue Dakin's quarter strength for cleansing and  wound vac  - Pressure redistribution device - air mattress  - Continue to offload  - Increase protein      - No sign localized infection during visit  - Refer to PT for E-stim  - Clinical factors affecting wound healing includes age, chronicity, cognitive issue co-morbidities, incontinence, location of the wound, mobility impairement, sensory deficits, behavioral issues  -Follow up : Next week

## 2022-09-14 NOTE — PROGRESS NOTES
[address] MANAG[facility]   AND [facility]       Patient ID: Perfecto Dandy is a 81 y o  female Date of Birth 1935     Location of Service: [address] [place] [address]    Performed wound round with: Wound team       Chief complaint: sacrum     Wound Instructions:    Wound sacrum  Cleanse the wound with Dakins quarter strenght  Apply skin prep to periwound area  Apply wound vac to wound bed, black foam 125 mmhg, continuous  Three ttimes a week and prn for soiling    Refer to PT for E-stim  Prevalon boots when in bed  Offload all wounds  Turn and reposition frequently, maximum of every two hours  Instruct / Assist with weight shifting every 15 - 20 minutes when in chair  Increase protein intake  Monitor for any sign of infection or worsening, inform PCP or patient's primary physician in your facility  Allergies  Patient has no known allergies  Assessment & Plan:  1  Pressure injury of sacral region, stage 4 ([facility])  Assessment & Plan:  - Location : Sacrum  - Wound healing status:  Decreased in depth and wound bed improved  - continue Dakin's quarter strength for cleansing and  wound vac  - Pressure redistribution device - air mattress  - Continue to offload  - Increase protein   - No sign localized infection during visit  - Refer to PT for E-stim  - Clinical factors affecting wound healing includes age, chronicity, cognitive issue co-morbidities, incontinence, location of the wound, mobility impairement, sensory deficits, behavioral issues  -Follow up : Next week                  Subjective:   8/3/2022 Follow up for multiple wounds   Patient have a complex medical history including but not limited to dementia, DVT on Eliquis, severe protein malnutrition  Patient was referred by Senior Care Team  Patient was seen in collaboration with the facility wound team      Received patient in bed, yelling  Patient is non cooperative during visit  Patient have a poor appetite    No other significant issues related to the wound  8/8/2022 Follow up for wound on the left buttock, sacrum, right arm and left arm   Received patient, yelling, non-cooperative  Facility staff did not report any significant issues related to the wound  8/17/2022 Follow up for wound on the sacrum and left buttock   Received patient in bed, yelling and non cooperative  Patient had been refusing to be turned  8/24/2022 Follow up for wound on the sacrum   Received patient, agitated  As per report, patient refused xray of the sacrum  9/7/2022 Follow up for wound on the sacrum  Received patient, non cooperative during visit  Facility staff did not report any significant issues related to the wound  9/14/2022 Follow up for wound on the sacrum   Received patient, not in distress  Facility staff did not report any significant issues related to the wound  Wound vac intact  Review of Systems   Unable to perform ROS: Dementia       Objective: There were no vitals taken for this visit  Physical Exam  Constitutional:       Appearance: Normal appearance  HENT:      Head: Normocephalic and atraumatic  Nose: Nose normal       Mouth/Throat:      Pharynx: Oropharynx is clear  Eyes:      Conjunctiva/sclera: Conjunctivae normal    Pulmonary:      Effort: Pulmonary effort is normal    Abdominal:      Tenderness: There is no abdominal tenderness  There is no guarding  Comments: With peg tube   Genitourinary:     Comments: incontinent  Musculoskeletal:         General: No tenderness  Cervical back: Normal range of motion  Right lower leg: No edema  Left lower leg: No edema  Comments: LROM   Skin:     Findings: Lesion present        Comments: Sacrum - wound size is 4 9 x 3 6  x1 cm, with undermining all around,  deepest at 12 - 2 cm, 95% granulation, 5% slough, with malodor, periwound- normal, no obvious sign of infection, + probe to bone, moderate amount of serosanguineous drainage     Neurological: Mental Status: She is alert  Gait: Gait abnormal    Psychiatric:         Mood and Affect: Mood normal          Behavior: Behavior normal               Procedures           Patient's care was coordinated with nursing facility staff  Recent vitals, labs and updated medications were reviewed on EMR or chart system of facility  Past Medical, surgical, social, medication and allergy history and patient's previous records were reviewed and updated as appropriate:     Patient Active Problem List   Diagnosis    DVT (deep venous thrombosis) ([place] [address] )    Essential hypertension    Dementia with behavioral disturbance ([place] [address] )    Generalized weakness    Severe protein-calorie malnutrition ([place] [address] )    Electrolyte disturbance    Stage IV pressure ulcer ([place] [address] )    SIRS (systemic inflammatory response syndrome) ([place] [address] )    Deep tissue injury    Traumatic injury to skin or subcutaneous tissue    Confused    Acute cholecystitis due to biliary calculus    Coffee ground emesis    Pressure injury of sacral region, stage 4 (HCC)    Non compliance with medical treatment     Past Medical History:   Diagnosis Date    A-fib ([place] [address] )     Dementia ([place] [address] )     DVT (deep venous thrombosis) ([place] [address] )     Hypertension     Pressure ulcer of sacral region      History reviewed  No pertinent surgical history  Social History     Socioeconomic History    Marital status:       Spouse name: None    Number of children: None    Years of education: None    Highest education level: None   Occupational History    None   Tobacco Use    Smoking status: Never Smoker    Smokeless tobacco: Never Used   Vaping Use    Vaping Use: Never used   Substance and Sexual Activity    Alcohol use: Not Currently    Drug use: Never    Sexual activity: Not Currently   Other Topics Concern    None   Social History Narrative    None     Social Determinants of Health     Financial Resource Strain: Not on file   Food Insecurity: No Food Insecurity    Worried About Running Out of Food in the Last Year: Never true    Ran Out of Food in the Last Year: Never true   Transportation Needs: No Transportation Needs    Lack of Transportation (Medical): No    Lack of Transportation (Non-Medical): No   Physical Activity: Not on file   Stress: Not on file   Social Connections: Not on file   Intimate Partner Violence: Not on file   Housing Stability: Low Risk     Unable to Pay for Housing in the Last Year: No    Number of Places Lived in the Last Year: 2    Unstable Housing in the Last Year: No        Current Outpatient Medications:     acetaminophen (TYLENOL) 325 mg tablet, Take 650 mg by mouth every 6 (six) hours as needed, Disp: , Rfl:     collagenase (SANTYL) ointment, Apply 1 application topically daily, Disp: , Rfl:     Eliquis 5 MG, Hold medication today, 6/30/2022 and tomorrow, 7/1/2022, Disp: 30 tablet, Rfl: 3    multivitamin (THERAGRAN) TABS, Take 1 tablet by mouth daily, Disp: , Rfl:     Skin Protectants, Misc  (PeriGuard) OINT, Apply topically, Disp: , Rfl:   History reviewed  No pertinent family history  Coordination of Care: Wound team aware of the treatment plan  Facility nurse will provide wound treatment and monitor the wound for any changes  Provided update to Senior care team     Patient / Staff education : Staff was given education on sign of infection and pressure ulcer prevention  Staff verbalized understanding     Follow up :  Next week      Voice-recognition software may have been used in the preparation of this document  Occasional wrong word or "sound-alike" substitutions may have occurred due to the inherent limitations of voice recognition software  Interpretation should be guided by context      Adry Jose

## 2022-09-21 ENCOUNTER — NURSING HOME VISIT (OUTPATIENT)
Dept: WOUND CARE | Facility: HOSPITAL | Age: 87
End: 2022-09-21
Payer: MEDICARE

## 2022-09-21 DIAGNOSIS — R32 DERMATITIS ASSOCIATED WITH INCONTINENCE: ICD-10-CM

## 2022-09-21 DIAGNOSIS — L25.8 DERMATITIS ASSOCIATED WITH INCONTINENCE: ICD-10-CM

## 2022-09-21 DIAGNOSIS — T14.8XXA BLISTER: ICD-10-CM

## 2022-09-21 DIAGNOSIS — L89.154 PRESSURE INJURY OF SACRAL REGION, STAGE 4 (HCC): Primary | ICD-10-CM

## 2022-09-21 PROCEDURE — 99309 SBSQ NF CARE MODERATE MDM 30: CPT | Performed by: NURSE PRACTITIONER

## 2022-09-21 NOTE — ASSESSMENT & PLAN NOTE
Left buttock  - this is a new wound, patient is incontinent of bowel in bladder, use of diaper is not a contributing factor  - 100% epithelial, with no obvious sign of infection  - local wound care with Z guard  - followup next week

## 2022-09-21 NOTE — PROGRESS NOTES
[address] MANAGEMENT   AND [facility]PER[facility]       Patient ID: Araceli Yan is a 81 y o  female Date of Birth 1935     Location of Service: [facility]    Performed wound round with: Wound team       Chief complaint: sacrum     Wound Instructions:    Wound sacrum  Cleanse the wound with Dakins quarter strenght  Apply skin prep to periwound area  Apply wound vac to wound bed, black foam 125 mmhg, continuous  Three ttimes a week and prn for soiling    Left buttock  Cleanse with NSS / wound cleanser  Apply zguard to wound bed  Twice a day and prn for soiling    Right abdomen  Cleanse with NSS  Apply skin prep to wound bed  Daily and prn for soiling    Prevalon boots when in bed  Offload all wounds  Turn and reposition frequently, maximum of every two hours  Instruct / Assist with weight shifting every 15 - 20 minutes when in chair  Increase protein intake  Monitor for any sign of infection or worsening, inform PCP or patient's primary physician in your facility  Allergies  Patient has no known allergies  Assessment & Plan:  1  Pressure injury of sacral region, stage 4 (HCC)  Assessment & Plan:  - Location : Sacrum  - Wound healing status:  Decreased in size and wound bed improved  - continue Dakin's quarter strength for cleansing and  wound vac  - Pressure redistribution device - air mattress  - Continue to offload  - Increase protein   - No sign localized infection during visit  - Refer to PT for E-stim - patient refused  - Clinical factors affecting wound healing includes age, chronicity, cognitive issue co-morbidities, incontinence, location of the wound, mobility impairement, sensory deficits, behavioral issues  -Follow up : Next week           2   Dermatitis associated with incontinence  Assessment & Plan:  Left buttock  - this is a new wound, patient is incontinent of bowel in bladder, use of diaper is not a contributing factor  - 100% epithelial, with no obvious sign of infection  - local wound care with Z guard  - followup next week      3  Blister  Assessment & Plan:  Right abdomen  - the blister probably from abrasion related to the brief  - the blister open, 100% epithelial, with no obvious sign of infection  - skin prep daily             Subjective:   8/3/2022 Follow up for multiple wounds   Patient have a complex medical history including but not limited to dementia, DVT on Eliquis, severe protein malnutrition  Patient was referred by Senior Care Team  Patient was seen in collaboration with the facility wound team      Received patient in bed, yelling  Patient is non cooperative during visit  Patient have a poor appetite  No other significant issues related to the wound  8/8/2022 Follow up for wound on the left buttock, sacrum, right arm and left arm   Received patient, yelling, non-cooperative  Facility staff did not report any significant issues related to the wound  8/17/2022 Follow up for wound on the sacrum and left buttock   Received patient in bed, yelling and non cooperative  Patient had been refusing to be turned  8/24/2022 Follow up for wound on the sacrum   Received patient, agitated  As per report, patient refused xray of the sacrum  9/7/2022 Follow up for wound on the sacrum  Received patient, non cooperative during visit  Facility staff did not report any significant issues related to the wound  9/14/2022 Follow up for wound on the sacrum   Received patient, not in distress  Facility staff did not report any significant issues related to the wound  Wound vac intact  9/21/2022 Follow up for wound on the sacrum and new wound on the left buttock and left abdomen  Received patient, not in distress  Facility staff did not report any significant issues related to the wound  Review of Systems   Unable to perform ROS: Dementia       Objective: There were no vitals taken for this visit      Physical Exam  Constitutional: Appearance: Normal appearance  HENT:      Head: Normocephalic and atraumatic  Nose: Nose normal       Mouth/Throat:      Pharynx: Oropharynx is clear  Eyes:      Conjunctiva/sclera: Conjunctivae normal    Pulmonary:      Effort: Pulmonary effort is normal    Abdominal:      Tenderness: There is no abdominal tenderness  There is no guarding  Comments: With peg tube   Genitourinary:     Comments: incontinent  Musculoskeletal:         General: No tenderness  Cervical back: Normal range of motion  Right lower leg: No edema  Left lower leg: No edema  Comments: LROM   Skin:     Findings: Lesion present  Comments: Sacrum - wound size is 3 25 x 3 01  x1 5 cm, with undermining all around,  deepest at 12 - 4 cm, 100% granulation, with malodor, periwound- normal, no obvious sign of infection, + probe to bone, moderate amount of serosanguineous drainage    Left buttock - wound size is 6 74 x 5 96 cm , 100% epithelial, small amount of serous drainage, no obvious sign of infection, wound edge is irregular, no malodor    Right abdomen - 100% epithelial, no drainage, with no obvious sign of infection     Neurological:      Mental Status: She is alert  Gait: Gait abnormal    Psychiatric:         Mood and Affect: Mood normal          Behavior: Behavior normal               Procedures           Patient's care was coordinated with nursing facility staff  Recent vitals, labs and updated medications were reviewed on EMR or chart system of facility   Past Medical, surgical, social, medication and allergy history and patient's previous records were reviewed and updated as appropriate:     Patient Active Problem List   Diagnosis    [facility] [address] [facility]    Essential hypertension    Dementia with behavioral disturbance ([facility] [facility]    Generalized weakness    Severe protein-calorie malnutrition ([address] )    Electrolyte disturbance    Stage IV pressure ulcer ([address] )    SIRS (systemic inflammatory response syndrome) (HCC)    Deep tissue injury    Traumatic injury to skin or subcutaneous tissue    Confused    Acute cholecystitis due to biliary calculus    Coffee ground emesis    Pressure injury of sacral region, stage 4 (HCC)    Non compliance with medical treatment    Dermatitis associated with incontinence    Blister     Past Medical History:   Diagnosis Date    A-fib ([place] [address] )     Dementia ([place] [address] )     DVT (deep venous thrombosis) ([place] [address] )     Hypertension     Pressure ulcer of sacral region      History reviewed  No pertinent surgical history  Social History     Socioeconomic History    Marital status:      Spouse name: None    Number of children: None    Years of education: None    Highest education level: None   Occupational History    None   Tobacco Use    Smoking status: Never Smoker    Smokeless tobacco: Never Used   Vaping Use    Vaping Use: Never used   Substance and Sexual Activity    Alcohol use: Not Currently    Drug use: Never    Sexual activity: Not Currently   Other Topics Concern    None   Social History Narrative    None     Social Determinants of Health     Financial Resource Strain: Not on file   Food Insecurity: No Food Insecurity    Worried About Running Out of Food in the Last Year: Never true    Stoney of Food in the Last Year: Never true   Transportation Needs: No Transportation Needs    Lack of Transportation (Medical): No    Lack of Transportation (Non-Medical):  No   Physical Activity: Not on file   Stress: Not on file   Social Connections: Not on file   Intimate Partner Violence: Not on file   Housing Stability: Low Risk     Unable to Pay for Housing in the Last Year: No    Number of Places Lived in the Last Year: 2    Unstable Housing in the Last Year: No        Current Outpatient Medications:     acetaminophen (TYLENOL) 325 mg tablet, Take 650 mg by mouth every 6 (six) hours as needed, Disp: , Rfl:     collagenase (SANTYL) ointment, Apply 1 application topically daily, Disp: , Rfl:     Eliquis 5 MG, Hold medication today, 6/30/2022 and tomorrow, 7/1/2022, Disp: 30 tablet, Rfl: 3    multivitamin (THERAGRAN) TABS, Take 1 tablet by mouth daily, Disp: , Rfl:     Skin Protectants, Misc  (PeriGuard) OINT, Apply topically, Disp: , Rfl:   History reviewed  No pertinent family history  Coordination of Care: Wound team aware of the treatment plan  Facility nurse will provide wound treatment and monitor the wound for any changes  Provided update to Senior care team     Patient / Staff education : Staff was given education on sign of infection and pressure ulcer prevention  Staff verbalized understanding     Follow up :  Next week      Voice-recognition software may have been used in the preparation of this document  Occasional wrong word or "sound-alike" substitutions may have occurred due to the inherent limitations of voice recognition software  Interpretation should be guided by context      Adyr Jose

## 2022-09-21 NOTE — ASSESSMENT & PLAN NOTE
Right abdomen  - the blister probably from abrasion related to the brief  - the blister open, 100% epithelial, with no obvious sign of infection  - skin prep daily

## 2022-09-21 NOTE — ASSESSMENT & PLAN NOTE
- Location : Sacrum  - Wound healing status:  Decreased in size and wound bed improved  - continue Dakin's quarter strength for cleansing and  wound vac  - Pressure redistribution device - air mattress  - Continue to offload  - Increase protein     - No sign localized infection during visit  - Refer to PT for E-stim - patient refused  - Clinical factors affecting wound healing includes age, chronicity, cognitive issue co-morbidities, incontinence, location of the wound, mobility impairement, sensory deficits, behavioral issues  -Follow up : Next week

## 2022-09-28 ENCOUNTER — NURSING HOME VISIT (OUTPATIENT)
Dept: WOUND CARE | Facility: HOSPITAL | Age: 87
End: 2022-09-28
Payer: MEDICARE

## 2022-09-28 DIAGNOSIS — L89.154 PRESSURE INJURY OF SACRAL REGION, STAGE 4 (HCC): Primary | ICD-10-CM

## 2022-09-28 DIAGNOSIS — T14.8XXA BLISTER: ICD-10-CM

## 2022-09-28 DIAGNOSIS — L25.8 DERMATITIS ASSOCIATED WITH INCONTINENCE: ICD-10-CM

## 2022-09-28 DIAGNOSIS — R32 DERMATITIS ASSOCIATED WITH INCONTINENCE: ICD-10-CM

## 2022-09-28 PROCEDURE — 99309 SBSQ NF CARE MODERATE MDM 30: CPT | Performed by: NURSE PRACTITIONER

## 2022-09-28 NOTE — ASSESSMENT & PLAN NOTE
- Location : Sacrum  - Wound healing status:  Increased in wound size with malodor  - change treatment to Dakin's wet to dry due to malodor and increase necrotic tissue  - Pressure redistribution device - air mattress  - Continue to offload  - Increase protein   - No sign localized infection during visit  - Refer to PT for E-stim - patient refused  - Clinical factors affecting wound healing includes age, chronicity, cognitive issue co-morbidities, incontinence, location of the wound, mobility impairement, sensory deficits, behavioral issues    This wound probably will not heal   -Follow up : Next week

## 2022-09-28 NOTE — PROGRESS NOTES
[address] [facility]NAG[facility]   [facility]       Patient ID: Richa Landers is a 81 y o  female Date of Birth 1935     Location of Service: [address] [place] [address]    Performed wound round with: Wound team       Chief complaint: sacrum     Wound Instructions:    Discontinue previus wound treatment  Wound sacrum  Cleanse the wound with Dakins quarter strenght  Apply zguard to periwound area  Gently fill the wound cavity loosely with kerlix soaked with Dakins quarter strength and cover with ABD pad  Twice a day and prn for soiling    Left buttock  Cleanse with NSS / wound cleanser  Apply zguard to wound bed  Twice a day and prn for soiling    Right abdomen  Cleanse with NSS  Apply bordered foam to wound bed  3 times a week and prn for soiling    Prevalon boots when in bed  Offload all wounds  Turn and reposition frequently, maximum of every two hours  Instruct / Assist with weight shifting every 15 - 20 minutes when in chair  Increase protein intake  Monitor for any sign of infection or worsening, inform PCP or patient's primary physician in your facility  Allergies  Patient has no known allergies  Assessment & Plan:  1  Pressure injury of sacral region, stage 4 ([facility])  Assessment & Plan:  - Location : Sacrum  - Wound healing status:  Increased in wound size with malodor  - change treatment to Dakin's wet to dry due to malodor and increase necrotic tissue  - Pressure redistribution device - air mattress  - Continue to offload  - Increase protein   - No sign localized infection during visit  - Refer to PT for E-stim - patient refused  - Clinical factors affecting wound healing includes age, chronicity, cognitive issue co-morbidities, incontinence, location of the wound, mobility impairement, sensory deficits, behavioral issues  This wound probably will not heal   -Follow up : Next week           2   Dermatitis associated with incontinence  Assessment & Plan:  Left buttock  - patient is incontinent of bowel, use of diaper is not a contributing factor  - decreased in size, 100% epithelial, with no obvious sign of infection  - local wound care with Z guard  - followup next week      3  Blister  Assessment & Plan:  Right abdomen  - the blister probably from abrasion related to the brief  - almost the same as last week, 100% epithelial, with no obvious sign of infection  - change treatment to border foam             Subjective:   8/3/2022 Follow up for multiple wounds   Patient have a complex medical history including but not limited to dementia, DVT on Eliquis, severe protein malnutrition  Patient was referred by Senior Care Team  Patient was seen in collaboration with the facility wound team      Received patient in bed, elias  Patient is non cooperative during visit  Patient have a poor appetite  No other significant issues related to the wound  8/8/2022 Follow up for wound on the left buttock, sacrum, right arm and left arm   Received patient, yelling, non-cooperative  Facility staff did not report any significant issues related to the wound  8/17/2022 Follow up for wound on the sacrum and left buttock   Received patient in bed, yelling and non cooperative  Patient had been refusing to be turned  8/24/2022 Follow up for wound on the sacrum   Received patient, agitated  As per report, patient refused xray of the sacrum  9/7/2022 Follow up for wound on the sacrum  Received patient, non cooperative during visit  Facility staff did not report any significant issues related to the wound  9/14/2022 Follow up for wound on the sacrum   Received patient, not in distress  Facility staff did not report any significant issues related to the wound  Wound vac intact  9/21/2022 Follow up for wound on the sacrum and new wound on the left buttock and left abdomen  Received patient, not in distress  Facility staff did not report any significant issues related to the wound       9/28/2022 Follow up for wound on the sacrum, left buttock, and left abdomen   Received patient, not in distress  Facility staff did not report any significant issues related to the wound  + malodor when you enter the patient's room  Review of Systems   Unable to perform ROS: Dementia       Objective: There were no vitals taken for this visit  Physical Exam  Constitutional:       Appearance: Normal appearance  HENT:      Head: Normocephalic and atraumatic  Nose: Nose normal       Mouth/Throat:      Pharynx: Oropharynx is clear  Eyes:      Conjunctiva/sclera: Conjunctivae normal    Pulmonary:      Effort: Pulmonary effort is normal    Abdominal:      Tenderness: There is no abdominal tenderness  There is no guarding  Comments: With peg tube   Genitourinary:     Comments: incontinent  Musculoskeletal:         General: No tenderness  Cervical back: Normal range of motion  Right lower leg: No edema  Left lower leg: No edema  Comments: LROM   Skin:     Findings: Lesion present  Comments: Sacrum - wound size is 3 32 x 4 09  x2 cm, with undermining all around,  deepest at 3 - 5 cm, 10% slough 90% granulation, with malodor, periwound- with necrotic tissue, no obvious sign of infection, + probe to bone, moderate amount of serosanguineous drainage    Left buttock - wound size is 1 49 x 1 01 cm , 100% epithelial, small amount of serous drainage, no obvious sign of infection, wound edge is irregular, no malodor    Right abdomen - 100% epithelial, no drainage, with no obvious sign of infection     Neurological:      Mental Status: She is alert  Gait: Gait abnormal    Psychiatric:         Mood and Affect: Mood normal          Behavior: Behavior normal               Procedures           Patient's care was coordinated with nursing facility staff  Recent vitals, labs and updated medications were reviewed on EMR or chart system of facility   Past Medical, surgical, social, medication and allergy history and patient's previous records were reviewed and updated as appropriate:     Patient Active Problem List   Diagnosis    DVT (deep venous thrombosis) ([place] [address] )    Essential hypertension    Dementia with behavioral disturbance (HCC)    Generalized weakness    Severe protein-calorie malnutrition (HCC)    Electrolyte disturbance    Stage IV pressure ulcer (HCC)    SIRS (systemic inflammatory response syndrome) (HCC)    Deep tissue injury    Traumatic injury to skin or subcutaneous tissue    Confused    Acute cholecystitis due to biliary calculus    Coffee ground emesis    Pressure injury of sacral region, stage 4 (HCC)    Non compliance with medical treatment    Dermatitis associated with incontinence    Blister     Past Medical History:   Diagnosis Date    A-fib ([place] [address] )     Dementia ([place] [address] )     DVT (deep venous thrombosis) ([place] [address] )     Hypertension     Pressure ulcer of sacral region      History reviewed  No pertinent surgical history  Social History     Socioeconomic History    Marital status:      Spouse name: None    Number of children: None    Years of education: None    Highest education level: None   Occupational History    None   Tobacco Use    Smoking status: Never Smoker    Smokeless tobacco: Never Used   Vaping Use    Vaping Use: Never used   Substance and Sexual Activity    Alcohol use: Not Currently    Drug use: Never    Sexual activity: Not Currently   Other Topics Concern    None   Social History Narrative    None     Social Determinants of Health     Financial Resource Strain: Not on file   Food Insecurity: No Food Insecurity    Worried About Running Out of Food in the Last Year: Never true    Stoney of Food in the Last Year: Never true   Transportation Needs: No Transportation Needs    Lack of Transportation (Medical): No    Lack of Transportation (Non-Medical):  No   Physical Activity: Not on file   Stress: Not on file   Social Connections: Not on file   Intimate Partner Violence: Not on file   Housing Stability: Low Risk     Unable to Pay for Housing in the Last Year: No    Number of Places Lived in the Last Year: 2    Unstable Housing in the Last Year: No        Current Outpatient Medications:     acetaminophen (TYLENOL) 325 mg tablet, Take 650 mg by mouth every 6 (six) hours as needed, Disp: , Rfl:     collagenase (SANTYL) ointment, Apply 1 application topically daily, Disp: , Rfl:     Eliquis 5 MG, Hold medication today, 6/30/2022 and tomorrow, 7/1/2022, Disp: 30 tablet, Rfl: 3    multivitamin (THERAGRAN) TABS, Take 1 tablet by mouth daily, Disp: , Rfl:     Skin Protectants, Misc  (PeriGuard) OINT, Apply topically, Disp: , Rfl:   History reviewed  No pertinent family history  Coordination of Care: Wound team aware of the treatment plan  Facility nurse will provide wound treatment and monitor the wound for any changes  Provided update to Senior care team     Patient / Staff education : Staff was given education on sign of infection and pressure ulcer prevention  Staff verbalized understanding     Follow up :  Next week      Voice-recognition software may have been used in the preparation of this document  Occasional wrong word or "sound-alike" substitutions may have occurred due to the inherent limitations of voice recognition software  Interpretation should be guided by context      Adry Jose

## 2022-09-28 NOTE — ASSESSMENT & PLAN NOTE
Left buttock  - patient is incontinent of bowel, use of diaper is not a contributing factor  - decreased in size, 100% epithelial, with no obvious sign of infection  - local wound care with Z guard  - followup next week

## 2022-09-28 NOTE — ASSESSMENT & PLAN NOTE
Right abdomen  - the blister probably from abrasion related to the brief  - almost the same as last week, 100% epithelial, with no obvious sign of infection  - change treatment to border foam

## 2022-10-03 ENCOUNTER — AMB VIDEO VISIT (OUTPATIENT)
Dept: WOUND CARE | Facility: HOSPITAL | Age: 87
End: 2022-10-03
Payer: MEDICARE

## 2022-10-03 DIAGNOSIS — R32 DERMATITIS ASSOCIATED WITH INCONTINENCE: Primary | ICD-10-CM

## 2022-10-03 DIAGNOSIS — L25.8 DERMATITIS ASSOCIATED WITH INCONTINENCE: Primary | ICD-10-CM

## 2022-10-03 PROCEDURE — 99213 OFFICE O/P EST LOW 20 MIN: CPT | Performed by: NURSE PRACTITIONER

## 2022-10-03 NOTE — PROGRESS NOTES
[address] [facility]NAGEMENT   [facility]       Patient ID: Corey Johnson is a 81 y o  female Date of Birth 1935     Location of Service: [facility]    Performed wound round with: Wound team       Chief complaint: right buttock      REQUIRED DOCUMENTATION:     1  This service was provided via Telemedicine  2  Provider located at [facility], [address] [place] [address]  3  TeleMed provider: JUDIE Wright  4  Identify all parties in room with patient during tele consult:  Eleuterio Dukes, NURse  5  Patient was then informed that this was a Telemedicine visit and that the exam was being conducted confidentially over secure lines  The office door was closed and there is no one in the room  Patient acknowledged consent and understanding of privacy and security of the Telemedicine visit, and gave us permission to have the assistant stay in the room in order to assist with the history and to conduct the exam   I informed the patient that I have reviewed their record in Epic and presented the opportunity for them to ask any questions regarding the visit today  The patient agreed to participate  I have spent 15 minutes with patent today in which greater than 50% of this time was spent in counseling/coordination of care regarding Intructions for management, review the previous medical records,  preparing to see the patients, obtain history, ordering treatments, and documenting clinical information in the electronic health record   Televisit performed with audio / video using [employer] team         Wound Instructions:    Wound sacrum  Cleanse the wound with Dakins quarter strenght  Apply zguard to periwound area  Gently fill the wound cavity loosely with kerlix soaked with Dakins quarter strength and cover with ABD pad  Twice a day and prn for soiling    Bilateral buttocks  Cleanse with NSS / wound cleanser  Apply zguard to wound bed  Twice a day and prn for soiling    Right abdomen  Cleanse with NSS  Apply bordered foam to wound bed  3 times a week and prn for soiling    Prevalon boots when in bed  Offload all wounds  Turn and reposition frequently, maximum of every two hours  Instruct / Assist with weight shifting every 15 - 20 minutes when in chair  Increase protein intake  Monitor for any sign of infection or worsening, inform PCP or patient's primary physician in your facility  Allergies  Patient has no known allergies  Assessment & Plan:  1  Dermatitis associated with incontinence  Assessment & Plan:  Right buttock  - patient is incontinent of bowel, use of diaper is not a contributing factor  - 100% epithelial, with no obvious sign of infection  - local wound care with Z guard  - followup in two days on wound round             Subjective:   10/3/2022 This is a new consult for wound on the right buttock   As per nurse supervisor report, a new wound on the right buttock was discovered  Possible etiology is MASD, Patient is incontinent of both bowel and bladder  Review of Systems   Unable to perform ROS: Dementia       Objective: There were no vitals taken for this visit  Physical Exam  HENT:      Head: Normocephalic and atraumatic  Pulmonary:      Effort: Pulmonary effort is normal    Abdominal:      Tenderness: There is no abdominal tenderness  There is no guarding  Comments: With peg tube   Genitourinary:     Comments: incontinent  Musculoskeletal:         General: No tenderness  Right lower leg: No edema  Left lower leg: No edema  Comments: LROM   Skin:     Findings: Lesion present  Comments: This is a focus assessment  Right buttock - wound size is 5 x 5 x 0 1cm, 100% epithelial, periwound is normal, no obvious sign of infection    - assessment is limited since this is a televisit   Neurological:      Mental Status: She is alert        Gait: Gait abnormal    Psychiatric:         Mood and Affect: Mood normal          Behavior: Behavior normal  Procedures           Patient's care was coordinated with nursing facility staff  Recent vitals, labs and updated medications were reviewed on EMR or chart system of facility  Past Medical, surgical, social, medication and allergy history and patient's previous records were reviewed and updated as appropriate:     Patient Active Problem List   Diagnosis    DVT (deep venous thrombosis) ([place] [address] )    Essential hypertension    Dementia with behavioral disturbance    Generalized weakness    Severe protein-calorie malnutrition ([place] [address] )    Electrolyte disturbance    Stage IV pressure ulcer (HCC)    SIRS (systemic inflammatory response syndrome) ([place] [address] )    Deep tissue injury    Traumatic injury to skin or subcutaneous tissue    Confused    Acute cholecystitis due to biliary calculus    Coffee ground emesis    Pressure injury of sacral region, stage 4 (HCC)    Non compliance with medical treatment    Dermatitis associated with incontinence    Blister     Past Medical History:   Diagnosis Date    A-fib ([place] [address] )     Dementia ([place] [address] )     DVT (deep venous thrombosis) ([place] [address] )     Hypertension     Pressure ulcer of sacral region      History reviewed  No pertinent surgical history  Social History     Socioeconomic History    Marital status:       Spouse name: None    Number of children: None    Years of education: None    Highest education level: None   Occupational History    None   Tobacco Use    Smoking status: Never Smoker    Smokeless tobacco: Never Used   Vaping Use    Vaping Use: Never used   Substance and Sexual Activity    Alcohol use: Not Currently    Drug use: Never    Sexual activity: Not Currently   Other Topics Concern    None   Social History Narrative    None     Social Determinants of Health     Financial Resource Strain: Not on file   Food Insecurity: No Food Insecurity    Worried About Running Out of Food in the Last Year: Never true    Stoney of Food in the Last Year: Never true Transportation Needs: No Transportation Needs    Lack of Transportation (Medical): No    Lack of Transportation (Non-Medical): No   Physical Activity: Not on file   Stress: Not on file   Social Connections: Not on file   Intimate Partner Violence: Not on file   Housing Stability: Low Risk     Unable to Pay for Housing in the Last Year: No    Number of Places Lived in the Last Year: 2    Unstable Housing in the Last Year: No        History reviewed  No pertinent family history  Coordination of Care: Wound team aware of the treatment plan  Facility nurse will provide wound treatment and monitor the wound for any changes  Provided update to Senior care team     Patient / Staff education : Staff was given education on sign of infection and pressure ulcer prevention  Staff verbalized understanding     Follow up :  Next week      Voice-recognition software may have been used in the preparation of this document  Occasional wrong word or "sound-alike" substitutions may have occurred due to the inherent limitations of voice recognition software  Interpretation should be guided by context      Adry Jose

## 2022-10-03 NOTE — ASSESSMENT & PLAN NOTE
Right buttock  - patient is incontinent of bowel, use of diaper is not a contributing factor  - 100% epithelial, with no obvious sign of infection  - local wound care with Z guard  - followup in two days on wound round

## 2022-10-05 ENCOUNTER — NURSING HOME VISIT (OUTPATIENT)
Dept: WOUND CARE | Facility: HOSPITAL | Age: 87
End: 2022-10-05
Payer: MEDICARE

## 2022-10-05 DIAGNOSIS — L89.154 PRESSURE INJURY OF SACRAL REGION, STAGE 4 (HCC): ICD-10-CM

## 2022-10-05 DIAGNOSIS — R32 DERMATITIS ASSOCIATED WITH INCONTINENCE: Primary | ICD-10-CM

## 2022-10-05 DIAGNOSIS — T14.8XXA BLISTER: ICD-10-CM

## 2022-10-05 DIAGNOSIS — L25.8 DERMATITIS ASSOCIATED WITH INCONTINENCE: Primary | ICD-10-CM

## 2022-10-05 PROCEDURE — 99308 SBSQ NF CARE LOW MDM 20: CPT | Performed by: NURSE PRACTITIONER

## 2022-10-05 NOTE — ASSESSMENT & PLAN NOTE
- Location : Sacrum  - Wound healing status:  Increased in wound size , no malodor  - change treatment to Dakin's wet to dry due to malodor and increase necrotic tissue  - Pressure redistribution device - air mattress  - Continue to offload  - Increase protein   - No sign localized infection during visit  - Refer to PT for E-stim - patient refused  - Clinical factors affecting wound healing includes age, chronicity, cognitive issue co-morbidities, incontinence, location of the wound, mobility impairement, sensory deficits, behavioral issues    This wound probably will not heal   -Follow up : Next week

## 2022-10-05 NOTE — ASSESSMENT & PLAN NOTE
Right buttock  - patient is incontinent of bowel, use of diaper is not a contributing factor  - Right buttock - wound size is 3 49 x 1 72 cm, 100% epithelial, with no obvious sign of infection  - left buttock - 100% epithelial  - local wound care with Z guard  - followup next week

## 2022-10-05 NOTE — PROGRESS NOTES
[address] MANAGEMENT   AND [facility]       Patient ID: Roya Stock is a 81 y o  female Date of Birth 1935     Location of Service: [address] [place] [address]    Performed wound round with: Wound team     Chief Complaint :  Sacrum and buttocks    Wound Instructions:  Discontinue previus wound treatment  Wound sacrum  Cleanse the wound with Dakins quarter strenght  Apply zguard to periwound area  Gently fill the wound cavity loosely with kerlix soaked with Dakins quarter strength and cover with ABD pad  Twice a day and prn for soiling     Left buttock and right buttock  Cleanse with NSS / wound cleanser  Apply zguard to wound bed  Twice a day and prn for soiling     Right abdomen  D/c wound order     Prevalon boots when in bed  Offload all wounds  Turn and reposition frequently, maximum of every two hours  Instruct / Assist with weight shifting every 15 - 20 minutes when in chair  Increase protein intake  Monitor for any sign of infection or worsening, inform PCP or patient's primary physician in your facility  Allergies  Patient has no known allergies  Assessment & Plan:  1  Dermatitis associated with incontinence  Assessment & Plan:  Right buttock  - patient is incontinent of bowel, use of diaper is not a contributing factor  - Right buttock - wound size is 3 49 x 1 72 cm, 100% epithelial, with no obvious sign of infection  - left buttock - 100% epithelial  - local wound care with Z guard  - followup next week      2  Pressure injury of sacral region, stage 4 ([facility])  Assessment & Plan:  - Location : Sacrum  - Wound healing status:  Increased in wound size , no malodor  - change treatment to Dakin's wet to dry due to malodor and increase necrotic tissue  - Pressure redistribution device - air mattress  - Continue to offload  - Increase protein     - No sign localized infection during visit  - Refer to PT for E-stim - patient refused  - Clinical factors affecting wound healing includes age, chronicity, cognitive issue co-morbidities, incontinence, location of the wound, mobility impairement, sensory deficits, behavioral issues  This wound probably will not heal   -Follow up : Next week           3  Blister  Assessment & Plan:  Right abdomen  - the blister probably from abrasion related to the brief  - healed             Subjective:   8/3/2022 Follow up for multiple wounds   Patient have a complex medical history including but not limited to dementia, DVT on Eliquis, severe protein malnutrition  Patient was referred by Senior Care Team  Patient was seen in collaboration with the facility wound team      Received patient in bed, yelling  Patient is non cooperative during visit  Patient have a poor appetite  No other significant issues related to the wound  8/8/2022 Follow up for wound on the left buttock, sacrum, right arm and left arm   Received patient, yelling, non-cooperative  Facility staff did not report any significant issues related to the wound  8/17/2022 Follow up for wound on the sacrum and left buttock   Received patient in bed, yelling and non cooperative  Patient had been refusing to be turned  8/24/2022 Follow up for wound on the sacrum   Received patient, agitated  As per report, patient refused xray of the sacrum  9/7/2022 Follow up for wound on the sacrum  Received patient, non cooperative during visit  Facility staff did not report any significant issues related to the wound  9/14/2022 Follow up for wound on the sacrum   Received patient, not in distress  Facility staff did not report any significant issues related to the wound  Wound vac intact  9/21/2022 Follow up for wound on the sacrum and new wound on the left buttock and left abdomen  Received patient, not in distress  Facility staff did not report any significant issues related to the wound  9/28/2022 Follow up for wound on the sacrum, left buttock, and left abdomen    Received patient, not in distress  Facility staff did not report any significant issues related to the wound  + malodor when you enter the patient's room    10/5/2022 Follow up for wound on the sacrum, left buttock, and left abdomen   Received patient, not in distress  Facility staff did not report any significant issues related to the wound  Patient health had been deteriorating as per report  She is for another hospice consult            Review of Systems   Unable to perform ROS: Dementia       Objective:    Physical Exam  Constitutional:       Appearance: She is ill-appearing  Comments: Response to painful stimuli   HENT:      Head: Normocephalic  Nose: Nose normal       Mouth/Throat:      Pharynx: Oropharynx is clear  Eyes:      Conjunctiva/sclera: Conjunctivae normal    Pulmonary:      Effort: Pulmonary effort is normal    Abdominal:      Tenderness: There is no abdominal tenderness  There is no guarding  Comments: With peg tube   Genitourinary:     Comments: With indwelling catheter  Musculoskeletal:         General: No tenderness  Cervical back: Normal range of motion  Right lower leg: No edema  Left lower leg: No edema  Comments: LROM   Skin:     Findings: Lesion present        Comments: Sacrum - wound size is 4 53 x 6 2 x 3 cm , with undermining all edges, deepest at 9 - 7 cm,   no malodor, periwound- red and dry, no obvious sign of infection, + probe to bone, moderate amount of serosanguineous drainage    Left buttock - wound size is 1 59 x 1 32 cm,, 100% epithelial, no drainage, no obvious sign of infection, wound edge is irregular, no malodor    Right abdomen - healed    Right buttock - wound size is 3 49 x 1 72 x 0 1 cm , 100% epithelial, no drainage, periwound is dry, no obvious sign of infection   Neurological:      Gait: Gait abnormal    Psychiatric:         Mood and Affect: Mood normal          Behavior: Behavior normal               Procedures           Patient's care was coordinated with nursing facility staff  Recent vitals, labs and updated medications were reviewed on EMR or chart system of facility  Past Medical, surgical, social, medication and allergy history and patient's previous records were reviewed and updated as appropriate: Most up-to date information is available in the facility EMR where the patient is currently admitted  Patient Active Problem List   Diagnosis    DVT (deep venous thrombosis) ([place] [address] )    Essential hypertension    Dementia with behavioral disturbance    Generalized weakness    Severe protein-calorie malnutrition (HCC)    Electrolyte disturbance    Stage IV pressure ulcer (HCC)    SIRS (systemic inflammatory response syndrome) (HCC)    Deep tissue injury    Traumatic injury to skin or subcutaneous tissue    Confused    Acute cholecystitis due to biliary calculus    Coffee ground emesis    Pressure injury of sacral region, stage 4 (HCC)    Non compliance with medical treatment    Dermatitis associated with incontinence    Blister     Past Medical History:   Diagnosis Date    A-fib ([place] [address] )     Dementia ([place] [address] )     DVT (deep venous thrombosis) ([place] [address] )     Hypertension     Pressure ulcer of sacral region      History reviewed  No pertinent surgical history  Social History     Socioeconomic History    Marital status:       Spouse name: None    Number of children: None    Years of education: None    Highest education level: None   Occupational History    None   Tobacco Use    Smoking status: Never Smoker    Smokeless tobacco: Never Used   Vaping Use    Vaping Use: Never used   Substance and Sexual Activity    Alcohol use: Not Currently    Drug use: Never    Sexual activity: Not Currently   Other Topics Concern    None   Social History Narrative    None     Social Determinants of Health     Financial Resource Strain: Not on file   Food Insecurity: No Food Insecurity    Worried About Running Out of Food in the Last Year: Never true   Mavis Cousin Ran Out of Food in the Last Year: Never true   Transportation Needs: No Transportation Needs    Lack of Transportation (Medical): No    Lack of Transportation (Non-Medical): No   Physical Activity: Not on file   Stress: Not on file   Social Connections: Not on file   Intimate Partner Violence: Not on file   Housing Stability: Low Risk     Unable to Pay for Housing in the Last Year: No    Number of Places Lived in the Last Year: 2    Unstable Housing in the Last Year: No        Current Outpatient Medications:     acetaminophen (TYLENOL) 325 mg tablet, Take 650 mg by mouth every 6 (six) hours as needed, Disp: , Rfl:     collagenase (SANTYL) ointment, Apply 1 application topically daily, Disp: , Rfl:     Eliquis 5 MG, Hold medication today, 6/30/2022 and tomorrow, 7/1/2022, Disp: 30 tablet, Rfl: 3    multivitamin (THERAGRAN) TABS, Take 1 tablet by mouth daily, Disp: , Rfl:     Skin Protectants, Misc  (PeriGuard) OINT, Apply topically, Disp: , Rfl:   History reviewed  No pertinent family history  Coordination of Care: Wound team aware of the treatment plan  Facility nurse will provide wound treatment and monitor the wound for any changes  Patient / Staff education : Patient / Staff was given education on sign of infection and pressure ulcer prevention  Patient/ Staff verbalized understanding     Follow up :  Next week    I have spent 20 minutes with Patient  today in which greater than 50% of this time was spent in counseling/coordination of care regarding Risks and benefits of tx options, Intructions for management, Importance of tx compliance, Risk factor reductions and Impressions  Voice-recognition software may have been used in the preparation of this document  Occasional wrong word or "sound-alike" substitutions may have occurred due to the inherent limitations of voice recognition software  Interpretation should be guided by context        Saumya Shape

## 2022-10-12 ENCOUNTER — NURSING HOME VISIT (OUTPATIENT)
Dept: WOUND CARE | Facility: HOSPITAL | Age: 87
End: 2022-10-12
Payer: MEDICARE

## 2022-10-12 DIAGNOSIS — L25.8 DERMATITIS ASSOCIATED WITH INCONTINENCE: Primary | ICD-10-CM

## 2022-10-12 DIAGNOSIS — R32 DERMATITIS ASSOCIATED WITH INCONTINENCE: Primary | ICD-10-CM

## 2022-10-12 DIAGNOSIS — L89.154 PRESSURE INJURY OF SACRAL REGION, STAGE 4 (HCC): ICD-10-CM

## 2022-10-12 PROCEDURE — 99308 SBSQ NF CARE LOW MDM 20: CPT | Performed by: NURSE PRACTITIONER

## 2022-10-12 NOTE — ASSESSMENT & PLAN NOTE
- Location : Sacrum  - Wound healing status:  Increased in wound size , no malodor, + probe bone  - change treatment to Dakin's wet to dry due to malodor and increase necrotic tissue  - Pressure redistribution device - air mattress  - Continue to offload  - Increase protein   - No sign localized infection during visit  - Refer to PT for E-stim - not candidate for possible osteomyelitis  - Clinical factors affecting wound healing includes age, chronicity, cognitive issue co-morbidities, incontinence, location of the wound, mobility impairement, sensory deficits, behavioral issues    This wound probably will not heal   -Follow up : Next week

## 2022-10-12 NOTE — PROGRESS NOTES
[address] MANAGEMENT   [facility]       Patient ID: Zan Fitch is a 81 y o  female Date of Birth 1935     Location of Service: [facility]    Performed wound round with: Wound team     Chief Complaint :  Sacrum and buttocks    Wound Instructions:  Discontinue previus wound treatment  Wound sacrum  Cleanse the wound with Dakins quarter strenght  Apply zguard to periwound area  Gently fill the wound cavity loosely with kerlix soaked with Dakins quarter strength and cover with ABD pad  Twice a day and prn for soiling     Right abdomen  D/c wound order     Prevalon boots when in bed  Offload all wounds  Turn and reposition frequently, maximum of every two hours  Instruct / Assist with weight shifting every 15 - 20 minutes when in chair  Increase protein intake  Monitor for any sign of infection or worsening, inform PCP or patient's primary physician in your facility  Allergies  Patient has no known allergies  Assessment & Plan:  1  Dermatitis associated with incontinence  Assessment & Plan:  Right buttock and left buttock  - healed      2  Pressure injury of sacral region, stage 4 ([facility])  Assessment & Plan:  - Location : Sacrum  - Wound healing status:  Increased in wound size , no malodor, + probe bone  - change treatment to Dakin's wet to dry due to malodor and increase necrotic tissue  - Pressure redistribution device - air mattress  - Continue to offload  - Increase protein   - No sign localized infection during visit  - Refer to PT for E-stim - not candidate for possible osteomyelitis  - Clinical factors affecting wound healing includes age, chronicity, cognitive issue co-morbidities, incontinence, location of the wound, mobility impairement, sensory deficits, behavioral issues  This wound probably will not heal   -Follow up : Next week                  Subjective:   8/3/2022 Follow up for multiple wounds    Patient have a complex medical history including but not limited to dementia, DVT on Eliquis, severe protein malnutrition  Patient was referred by Senior Care Team  Patient was seen in collaboration with the facility wound team      Received patient in bed, yeyarely  Patient is non cooperative during visit  Patient have a poor appetite  No other significant issues related to the wound  8/8/2022 Follow up for wound on the left buttock, sacrum, right arm and left arm   Received patient, yelling, non-cooperative  Facility staff did not report any significant issues related to the wound  8/17/2022 Follow up for wound on the sacrum and left buttock   Received patient in bed, yelling and non cooperative  Patient had been refusing to be turned  8/24/2022 Follow up for wound on the sacrum   Received patient, agitated  As per report, patient refused xray of the sacrum  9/7/2022 Follow up for wound on the sacrum  Received patient, non cooperative during visit  Facility staff did not report any significant issues related to the wound  9/14/2022 Follow up for wound on the sacrum   Received patient, not in distress  Facility staff did not report any significant issues related to the wound  Wound vac intact  9/21/2022 Follow up for wound on the sacrum and new wound on the left buttock and left abdomen  Received patient, not in distress  Facility staff did not report any significant issues related to the wound  9/28/2022 Follow up for wound on the sacrum, left buttock, and left abdomen   Received patient, not in distress  Facility staff did not report any significant issues related to the wound  + malodor when you enter the patient's room    10/5/2022 Follow up for wound on the sacrum, left buttock, and left abdomen   Received patient, not in distress  Facility staff did not report any significant issues related to the wound  Patient health had been deteriorating as per report  She is for another hospice consult         10/12/2022 Follow up for wound on the sacrum and left buttock   Received patient, not in distress  Facility staff did not report any significant issues related to the wound  Review of Systems   Unable to perform ROS: Dementia       Objective:    Physical Exam  Constitutional:       Appearance: She is ill-appearing  Comments: Response to painful stimuli   HENT:      Head: Normocephalic  Nose: Nose normal       Mouth/Throat:      Pharynx: Oropharynx is clear  Eyes:      Conjunctiva/sclera: Conjunctivae normal    Pulmonary:      Effort: Pulmonary effort is normal    Abdominal:      Tenderness: There is no abdominal tenderness  There is no guarding  Comments: With peg tube   Genitourinary:     Comments: With indwelling catheter  Musculoskeletal:         General: No tenderness  Cervical back: Normal range of motion  Right lower leg: No edema  Left lower leg: No edema  Comments: LROM   Skin:     Findings: Lesion present  Comments: Sacrum - wound size is 5 x 5 12 x 1 cm, with undermining all edges, deepest at 9 - 5 cm,   no malodor, periwound- red and dry, no obvious sign of infection, + probe to bone, moderate amount of serosanguineous drainage    Left buttock - healed    Right abdomen - healed    Right buttock - healed   Neurological:      Gait: Gait abnormal    Psychiatric:         Mood and Affect: Mood normal          Behavior: Behavior normal               Procedures           Patient's care was coordinated with nursing facility staff  Recent vitals, labs and updated medications were reviewed on EMR or chart system of facility  Past Medical, surgical, social, medication and allergy history and patient's previous records were reviewed and updated as appropriate: Most up-to date information is available in the facility EMR where the patient is currently admitted      Patient Active Problem List   Diagnosis   • DVT (deep venous thrombosis) (HCC)   • Essential hypertension   • Dementia with behavioral disturbance   • Generalized weakness   • Severe protein-calorie malnutrition (HCC)   • Electrolyte disturbance   • Stage IV pressure ulcer (HCC)   • SIRS (systemic inflammatory response syndrome) (HCC)   • Deep tissue injury   • Traumatic injury to skin or subcutaneous tissue   • Confused   • Acute cholecystitis due to biliary calculus   • Coffee ground emesis   • Pressure injury of sacral region, stage 4 (HCC)   • Non compliance with medical treatment   • Dermatitis associated with incontinence   • Blister     Past Medical History:   Diagnosis Date   • A-fib ([facility] )    • Dementia ([facility] )    • DVT (deep venous thrombosis) ([facility])    • Hypertension    • Pressure ulcer of sacral region      History reviewed  No pertinent surgical history  Social History     Socioeconomic History   • Marital status:      Spouse name: None   • Number of children: None   • Years of education: None   • Highest education level: None   Occupational History   • None   Tobacco Use   • Smoking status: Never Smoker   • Smokeless tobacco: Never Used   Vaping Use   • Vaping Use: Never used   Substance and Sexual Activity   • Alcohol use: Not Currently   • Drug use: Never   • Sexual activity: Not Currently   Other Topics Concern   • None   Social History Narrative   • None     Social Determinants of Health     Financial Resource Strain: Not on file   Food Insecurity: No Food Insecurity   • Worried About Running Out of Food in the Last Year: Never true   • Ran Out of Food in the Last Year: Never true   Transportation Needs: No Transportation Needs   • Lack of Transportation (Medical): No   • Lack of Transportation (Non-Medical):  No   Physical Activity: Not on file   Stress: Not on file   Social Connections: Not on file   Intimate Partner Violence: Not on file   Housing Stability: Low Risk    • Unable to Pay for Housing in the Last Year: No   • Number of Places Lived in the Last Year: 2   • Unstable Housing in the Last Year: No        Current Outpatient Medications:   •  acetaminophen (TYLENOL) 325 mg tablet, Take 650 mg by mouth every 6 (six) hours as needed, Disp: , Rfl:   •  collagenase (SANTYL) ointment, Apply 1 application topically daily, Disp: , Rfl:   •  Eliquis 5 MG, Hold medication today, 6/30/2022 and tomorrow, 7/1/2022, Disp: 30 tablet, Rfl: 3  •  multivitamin (THERAGRAN) TABS, Take 1 tablet by mouth daily, Disp: , Rfl:   •  Skin Protectants, Misc  (PeriGuard) OINT, Apply topically, Disp: , Rfl:   History reviewed  No pertinent family history  Coordination of Care: Wound team aware of the treatment plan  Facility nurse will provide wound treatment and monitor the wound for any changes  Patient / Staff education : Patient / Staff was given education on sign of infection and pressure ulcer prevention  Patient/ Staff verbalized understanding     Follow up :  Next week    Voice-recognition software may have been used in the preparation of this document  Occasional wrong word or "sound-alike" substitutions may have occurred due to the inherent limitations of voice recognition software  Interpretation should be guided by yadi Nicholas
